# Patient Record
Sex: MALE | Race: WHITE | URBAN - METROPOLITAN AREA
[De-identification: names, ages, dates, MRNs, and addresses within clinical notes are randomized per-mention and may not be internally consistent; named-entity substitution may affect disease eponyms.]

---

## 2017-05-30 ENCOUNTER — HOSPITAL ENCOUNTER (EMERGENCY)
Facility: CLINIC | Age: 77
Discharge: HOME | DRG: 897 | End: 2017-05-30
Attending: EMERGENCY MEDICINE | Admitting: EMERGENCY MEDICINE
Payer: MEDICARE

## 2017-05-30 VITALS
HEART RATE: 77 BPM | OXYGEN SATURATION: 93 % | BODY MASS INDEX: 33.32 KG/M2 | HEIGHT: 65 IN | WEIGHT: 200 LBS | DIASTOLIC BLOOD PRESSURE: 77 MMHG | SYSTOLIC BLOOD PRESSURE: 119 MMHG | TEMPERATURE: 97.8 F | RESPIRATION RATE: 20 BRPM

## 2017-05-30 DIAGNOSIS — R44.3 HALLUCINATIONS: ICD-10-CM

## 2017-05-30 LAB
ALBUMIN UR-MCNC: NEGATIVE MG/DL
ANION GAP SERPL CALCULATED.3IONS-SCNC: 10 MMOL/L (ref 3–14)
APPEARANCE UR: CLEAR
BILIRUB UR QL STRIP: NEGATIVE
BUN SERPL-MCNC: 21 MG/DL (ref 7–30)
CALCIUM SERPL-MCNC: 9.1 MG/DL (ref 8.5–10.1)
CHLORIDE SERPL-SCNC: 102 MMOL/L (ref 94–109)
CO2 SERPL-SCNC: 25 MMOL/L (ref 20–32)
COLOR UR AUTO: YELLOW
CREAT SERPL-MCNC: 1.22 MG/DL (ref 0.66–1.25)
GFR SERPL CREATININE-BSD FRML MDRD: 57 ML/MIN/1.7M2
GLUCOSE SERPL-MCNC: 90 MG/DL (ref 70–99)
GLUCOSE UR STRIP-MCNC: NEGATIVE MG/DL
HGB UR QL STRIP: NEGATIVE
KETONES UR STRIP-MCNC: ABNORMAL MG/DL
LEUKOCYTE ESTERASE UR QL STRIP: ABNORMAL
NITRATE UR QL: NEGATIVE
PH UR STRIP: 6 PH (ref 5–7)
POTASSIUM SERPL-SCNC: 3.9 MMOL/L (ref 3.4–5.3)
RBC #/AREA URNS AUTO: NORMAL /HPF (ref 0–2)
SODIUM SERPL-SCNC: 137 MMOL/L (ref 133–144)
SP GR UR STRIP: 1.02 (ref 1–1.03)
URN SPEC COLLECT METH UR: ABNORMAL
UROBILINOGEN UR STRIP-ACNC: 0.2 EU/DL (ref 0.2–1)
WBC #/AREA URNS AUTO: NORMAL /HPF (ref 0–2)

## 2017-05-30 ASSESSMENT — ENCOUNTER SYMPTOMS
VOMITING: 0
HALLUCINATIONS: 1
FEVER: 0
ABDOMINAL PAIN: 0

## 2017-05-30 NOTE — ED AVS SNAPSHOT
Emergency Department    [address] [place] [address]    Phone:  [phone]    Fax:  [phone]                                       Deion Diaz   MRN: [id number]    Department:   Emergency Department   Date of Visit:  5/30/2017           After Visit Summary Signature Page     I have received my discharge instructions, and my questions have been answered. I have discussed any challenges I see with this plan with the nurse or doctor.    ..........................................................................................................................................  Patient/Patient Representative Signature      ..........................................................................................................................................  Patient Representative Print Name and Relationship to Patient    ..................................................               ................................................  Date                                            Time    ..........................................................................................................................................  Reviewed by Signature/Title    ...................................................              ..............................................  Date                                                            Time

## 2017-05-30 NOTE — ED AVS SNAPSHOT
Emergency Department    [address] [facility] [address]    Phone:  [phone]    Fax:  [phone]                                       Deion Diaz   MRN: [id number]    Department:   Emergency Department   Date of Visit:  5/30/2017           Patient Information     Date Of Birth          1940        Your diagnoses for this visit were:     Hallucinations        You were seen by Corie Garza MD.      Follow-up Information     Follow up with Jerrod Graves MD.    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    Specialty:  EMERGENCY MEDICINE    Why:  As needed, If symptoms worsen    Contact information:    [address] [facility] [address] [phone]        Discharge Instructions       Stop taking the Bactrim antibiotic.    Consider also stopping the Oxycodone today also, because this could be the cause of your hallucinations also.  If you choose not to stop the Oxycodone today, then if the hallucinations persist after you stop the Bactrim, then you will need to stop the Oxycodone also and go back on the Vicodin and discuss alternative pain medication with your doctor.    Do not drive a car until after your hallucinations have been resolved for at least 48 hours.    Discharge References/Attachments     WHAT IS DELIRIUM? (ENGLISH)    CARING FOR A PERSON WITH DELIRIUM (ENGLISH)      24 Hour Appointment Hotline       To make an appointment at any [facility], call [phone] ([phone]). If you don't have a family doctor or clinic, we will help you find one. [place] clinics are conveniently located to serve the needs of you and your family.             Review of your medicines      Our records show that you are taking the  medicines listed below. If these are incorrect, please call your family doctor or clinic.        Dose / Directions Last dose taken    albuterol 108 (90 BASE) MCG/ACT Inhaler   Commonly known as:  PROAIR HFA/PROVENTIL HFA/VENTOLIN HFA   Dose:  2 puff        Inhale 2 puffs into the lungs every 6 hours   Refills:  0        ALLOPURINOL PO        Refills:  0        ASPIRIN PO        Refills:  0        ATENOLOL PO        Refills:  0        ATORVASTATIN CALCIUM PO        Refills:  0        BUPROPION HCL PO        Refills:  0        FLOMAX 0.4 MG capsule   Generic drug:  tamsulosin        Take by mouth daily   Refills:  0        fluticasone 27.5 MCG/SPRAY spray   Commonly known as:  VERAMYST   Dose:  2 spray        Spray 2 sprays into both nostrils daily   Refills:  0        fluticasone-salmeterol 500-50 MCG/DOSE diskus inhaler   Commonly known as:  ADVAIR   Dose:  1 puff        Inhale 1 puff into the lungs every 12 hours   Refills:  0        FUROSEMIDE PO        Refills:  0        GABAPENTIN PO        Refills:  0        HYDROcodone-acetaminophen 5-325 MG per tablet   Commonly known as:  NORCO   Dose:  1 tablet        Take 1 tablet by mouth every 6 hours as needed for moderate to severe pain   Refills:  0        Multi-vitamin Tabs tablet   Dose:  1 tablet        Take 1 tablet by mouth daily   Refills:  0        NITROGLYCERIN SL   Dose:  0.4 mg        Place 0.4 mg under the tongue   Refills:  0                Procedures and tests performed during your visit     *UA reflex to Microscopic (ED Lab POCT Only 3-11)    Basic metabolic panel    Urine Microscopic      Orders Needing Specimen Collection     None      Pending Results     No orders found from 5/28/2017 to 5/31/2017.            Pending Culture Results     No orders found from 5/28/2017 to 5/31/2017.            Pending Results Instructions     If you had any lab results that were not finalized at the time of your Discharge, you can call the ED Lab Result RN at  [phone]. You will be contacted by this team for any positive Lab results or changes in treatment. The nurses are available 7 days a week from 10A to 6:30P.  You can leave a message 24 hours per day and they will return your call.        Test Results From Your Hospital Stay        5/30/2017  8:10 PM      Component Results     Component Value Ref Range & Units Status    Color Urine Yellow  Final    Appearance Urine Clear  Final    Glucose Urine Negative NEG mg/dL Final    Bilirubin Urine Negative NEG Final    Ketones Urine Trace (A) NEG mg/dL Final    Specific Gravity Urine 1.020 1.003 - 1.035 Final    Blood Urine Negative NEG Final    pH Urine 6.0 5.0 - 7.0 pH Final    Protein Albumin Urine Negative NEG mg/dL Final    Urobilinogen Urine 0.2 0.2 - 1.0 EU/dL Final    Nitrite Urine Negative NEG Final    Leukocyte Esterase Urine Trace (A) NEG Final    Source Midstream Urine  Final         5/30/2017  8:19 PM      Component Results     Component Value Ref Range & Units Status    Sodium 137 133 - 144 mmol/L Final    Potassium 3.9 3.4 - 5.3 mmol/L Final    Chloride 102 94 - 109 mmol/L Final    Carbon Dioxide 25 20 - 32 mmol/L Final    Anion Gap 10 3 - 14 mmol/L Final    Glucose 90 70 - 99 mg/dL Final    Urea Nitrogen 21 7 - 30 mg/dL Final    Creatinine 1.22 0.66 - 1.25 mg/dL Final    GFR Estimate 57 (L) >60 mL/min/1.7m2 Final    Non  GFR Calc    GFR Estimate If Black 69 >60 mL/min/1.7m2 Final    African American GFR Calc    Calcium 9.1 8.5 - 10.1 mg/dL Final         5/30/2017  8:10 PM      Component Results     Component Value Ref Range & Units Status    WBC Urine O - 2 0 - 2 /HPF Final    RBC Urine O - 2 0 - 2 /HPF Final                Clinical Quality Measure: Blood Pressure Screening     Your blood pressure was checked while you were in the emergency department today. The last reading we obtained was  BP: 119/77 . Please read the guidelines below about what these numbers mean and what you should do  about them.  If your systolic blood pressure (the top number) is less than 120 and your diastolic blood pressure (the bottom number) is less than 80, then your blood pressure is normal. There is nothing more that you need to do about it.  If your systolic blood pressure (the top number) is 120-139 or your diastolic blood pressure (the bottom number) is 80-89, your blood pressure may be higher than it should be. You should have your blood pressure rechecked within a year by a primary care provider.  If your systolic blood pressure (the top number) is 140 or greater or your diastolic blood pressure (the bottom number) is 90 or greater, you may have high blood pressure. High blood pressure is treatable, but if left untreated over time it can put you at risk for heart attack, stroke, or kidney failure. You should have your blood pressure rechecked by a primary care provider within the next 4 weeks.  If your provider in the emergency department today gave you specific instructions to follow-up with your doctor or provider even sooner than that, you should follow that instruction and not wait for up to 4 weeks for your follow-up visit.        Thank you for choosing [facility]       Thank you for choosing [facility] for your care. Our goal is always to provide you with excellent care. Hearing back from our patients is one way we can continue to improve our services. Please take a few minutes to complete the written survey that you may receive in the mail after you visit with us. Thank you!        [facility] Information     [facility] gives you secure access to your electronic health record. If you see a primary care provider, you can also send messages to your care team and make appointments. If you have questions, please call your primary care clinic.  If you do not have a primary care provider, please call [phone] and they will assist you.        Care EveryWhere ID     This is your Care EveryWhere ID. This could be used by  other organizations to access your [facility] medical records  [id number]        After Visit Summary       This is your record. Keep this with you and show to your community pharmacist(s) and doctor(s) at your next visit.

## 2017-05-31 ENCOUNTER — APPOINTMENT (OUTPATIENT)
Dept: CT IMAGING | Facility: CLINIC | Age: 77
DRG: 897 | End: 2017-05-31
Attending: EMERGENCY MEDICINE
Payer: MEDICARE

## 2017-05-31 ENCOUNTER — HOSPITAL ENCOUNTER (INPATIENT)
Facility: CLINIC | Age: 77
LOS: 1 days | Discharge: NURSING, REHAB OR LONG-TERM CARE | DRG: 897 | End: 2017-06-03
Attending: EMERGENCY MEDICINE | Admitting: HOSPITALIST
Payer: MEDICARE

## 2017-05-31 DIAGNOSIS — M19.90 ARTHRITIS: ICD-10-CM

## 2017-05-31 DIAGNOSIS — G89.29 OTHER CHRONIC PAIN: ICD-10-CM

## 2017-05-31 DIAGNOSIS — R44.3 HALLUCINATIONS: ICD-10-CM

## 2017-05-31 DIAGNOSIS — T50.905A MEDICATION REACTION, INITIAL ENCOUNTER: ICD-10-CM

## 2017-05-31 DIAGNOSIS — R60.0 LOCALIZED EDEMA: Primary | ICD-10-CM

## 2017-05-31 PROBLEM — R62.7 FAILURE TO THRIVE IN ADULT: Status: ACTIVE | Noted: 2017-05-31

## 2017-05-31 LAB
ALBUMIN UR-MCNC: NEGATIVE MG/DL
ANION GAP SERPL CALCULATED.3IONS-SCNC: 8 MMOL/L (ref 3–14)
APPEARANCE UR: CLEAR
BACTERIA #/AREA URNS HPF: ABNORMAL /HPF
BASOPHILS # BLD AUTO: 0 10E9/L (ref 0–0.2)
BASOPHILS NFR BLD AUTO: 0.2 %
BILIRUB UR QL STRIP: NEGATIVE
BUN SERPL-MCNC: 21 MG/DL (ref 7–30)
CALCIUM SERPL-MCNC: 9.7 MG/DL (ref 8.5–10.1)
CHLORIDE SERPL-SCNC: 102 MMOL/L (ref 94–109)
CO2 SERPL-SCNC: 27 MMOL/L (ref 20–32)
COLOR UR AUTO: YELLOW
CREAT SERPL-MCNC: 1.33 MG/DL (ref 0.66–1.25)
DIFFERENTIAL METHOD BLD: NORMAL
EOSINOPHIL # BLD AUTO: 0.1 10E9/L (ref 0–0.7)
EOSINOPHIL NFR BLD AUTO: 1 %
ERYTHROCYTE [DISTWIDTH] IN BLOOD BY AUTOMATED COUNT: 13.3 % (ref 10–15)
ERYTHROCYTE [DISTWIDTH] IN BLOOD BY AUTOMATED COUNT: 13.3 % (ref 10–15)
GFR SERPL CREATININE-BSD FRML MDRD: 52 ML/MIN/1.7M2
GLUCOSE SERPL-MCNC: 86 MG/DL (ref 70–99)
GLUCOSE UR STRIP-MCNC: NEGATIVE MG/DL
HCT VFR BLD AUTO: 38.6 % (ref 40–53)
HCT VFR BLD AUTO: 43.2 % (ref 40–53)
HGB BLD-MCNC: 13 G/DL (ref 13.3–17.7)
HGB BLD-MCNC: 14.4 G/DL (ref 13.3–17.7)
HGB UR QL STRIP: NEGATIVE
IMM GRANULOCYTES # BLD: 0 10E9/L (ref 0–0.4)
IMM GRANULOCYTES NFR BLD: 0.3 %
KETONES UR STRIP-MCNC: 15 MG/DL
LEUKOCYTE ESTERASE UR QL STRIP: ABNORMAL
LYMPHOCYTES # BLD AUTO: 1.2 10E9/L (ref 0.8–5.3)
LYMPHOCYTES NFR BLD AUTO: 11.1 %
MCH RBC QN AUTO: 30.3 PG (ref 26.5–33)
MCH RBC QN AUTO: 30.4 PG (ref 26.5–33)
MCHC RBC AUTO-ENTMCNC: 33.3 G/DL (ref 31.5–36.5)
MCHC RBC AUTO-ENTMCNC: 33.7 G/DL (ref 31.5–36.5)
MCV RBC AUTO: 90 FL (ref 78–100)
MCV RBC AUTO: 91 FL (ref 78–100)
MONOCYTES # BLD AUTO: 1.2 10E9/L (ref 0–1.3)
MONOCYTES NFR BLD AUTO: 11.2 %
NEUTROPHILS # BLD AUTO: 8 10E9/L (ref 1.6–8.3)
NEUTROPHILS NFR BLD AUTO: 76.2 %
NITRATE UR QL: NEGATIVE
NRBC # BLD AUTO: 0 10*3/UL
NRBC BLD AUTO-RTO: 0 /100
PH UR STRIP: 6 PH (ref 5–7)
PLATELET # BLD AUTO: 287 10E9/L (ref 150–450)
PLATELET # BLD AUTO: 327 10E9/L (ref 150–450)
POTASSIUM SERPL-SCNC: 4 MMOL/L (ref 3.4–5.3)
RBC # BLD AUTO: 4.28 10E12/L (ref 4.4–5.9)
RBC # BLD AUTO: 4.75 10E12/L (ref 4.4–5.9)
RBC #/AREA URNS AUTO: ABNORMAL /HPF (ref 0–2)
SODIUM SERPL-SCNC: 137 MMOL/L (ref 133–144)
SP GR UR STRIP: 1.01 (ref 1–1.03)
URN SPEC COLLECT METH UR: ABNORMAL
UROBILINOGEN UR STRIP-ACNC: 0.2 EU/DL (ref 0.2–1)
WBC # BLD AUTO: 10.5 10E9/L (ref 4–11)
WBC # BLD AUTO: 10.8 10E9/L (ref 4–11)
WBC #/AREA URNS AUTO: ABNORMAL /HPF (ref 0–2)

## 2017-05-31 PROCEDURE — 99285 EMERGENCY DEPT VISIT HI MDM: CPT | Mod: 25

## 2017-05-31 PROCEDURE — G0378 HOSPITAL OBSERVATION PER HR: HCPCS

## 2017-05-31 PROCEDURE — 81001 URINALYSIS AUTO W/SCOPE: CPT | Performed by: EMERGENCY MEDICINE

## 2017-05-31 PROCEDURE — 25000132 ZZH RX MED GY IP 250 OP 250 PS 637: Mod: GY | Performed by: HOSPITALIST

## 2017-05-31 PROCEDURE — 36415 COLL VENOUS BLD VENIPUNCTURE: CPT | Performed by: HOSPITALIST

## 2017-05-31 PROCEDURE — 99220 ZZC INITIAL OBSERVATION CARE,LEVL III: CPT | Performed by: HOSPITALIST

## 2017-05-31 PROCEDURE — 80048 BASIC METABOLIC PNL TOTAL CA: CPT | Performed by: EMERGENCY MEDICINE

## 2017-05-31 PROCEDURE — 85025 COMPLETE CBC W/AUTO DIFF WBC: CPT | Performed by: EMERGENCY MEDICINE

## 2017-05-31 PROCEDURE — A9270 NON-COVERED ITEM OR SERVICE: HCPCS | Mod: GY | Performed by: HOSPITALIST

## 2017-05-31 PROCEDURE — 70450 CT HEAD/BRAIN W/O DYE: CPT

## 2017-05-31 PROCEDURE — 85027 COMPLETE CBC AUTOMATED: CPT | Performed by: HOSPITALIST

## 2017-05-31 RX ORDER — BUPROPION HYDROCHLORIDE 150 MG/1
150 TABLET, EXTENDED RELEASE ORAL 2 TIMES DAILY
Status: ON HOLD | COMMUNITY
End: 2017-06-03

## 2017-05-31 RX ORDER — TAMSULOSIN HYDROCHLORIDE 0.4 MG/1
0.4 CAPSULE ORAL AT BEDTIME
Status: DISCONTINUED | OUTPATIENT
Start: 2017-05-31 | End: 2017-06-03 | Stop reason: HOSPADM

## 2017-05-31 RX ORDER — POLYETHYLENE GLYCOL 3350 17 G/17G
17 POWDER, FOR SOLUTION ORAL DAILY PRN
Status: DISCONTINUED | OUTPATIENT
Start: 2017-05-31 | End: 2017-06-03 | Stop reason: HOSPADM

## 2017-05-31 RX ORDER — HYDROCODONE BITARTRATE AND ACETAMINOPHEN 5; 325 MG/1; MG/1
1 TABLET ORAL EVERY 4 HOURS PRN
Status: DISCONTINUED | OUTPATIENT
Start: 2017-05-31 | End: 2017-06-03 | Stop reason: HOSPADM

## 2017-05-31 RX ORDER — ONDANSETRON 4 MG/1
4 TABLET, ORALLY DISINTEGRATING ORAL EVERY 6 HOURS PRN
Status: DISCONTINUED | OUTPATIENT
Start: 2017-05-31 | End: 2017-06-03 | Stop reason: HOSPADM

## 2017-05-31 RX ORDER — BUPROPION HYDROCHLORIDE 150 MG/1
150 TABLET, EXTENDED RELEASE ORAL 2 TIMES DAILY
Status: DISCONTINUED | OUTPATIENT
Start: 2017-05-31 | End: 2017-06-01

## 2017-05-31 RX ORDER — GABAPENTIN 300 MG/1
300 CAPSULE ORAL AT BEDTIME
Status: DISCONTINUED | OUTPATIENT
Start: 2017-05-31 | End: 2017-06-03 | Stop reason: HOSPADM

## 2017-05-31 RX ORDER — SODIUM CHLORIDE 9 MG/ML
INJECTION, SOLUTION INTRAVENOUS CONTINUOUS
Status: CANCELLED | OUTPATIENT
Start: 2017-05-31

## 2017-05-31 RX ORDER — ONDANSETRON 2 MG/ML
4 INJECTION INTRAMUSCULAR; INTRAVENOUS EVERY 6 HOURS PRN
Status: DISCONTINUED | OUTPATIENT
Start: 2017-05-31 | End: 2017-06-03 | Stop reason: HOSPADM

## 2017-05-31 RX ORDER — ACETAMINOPHEN 325 MG/1
650 TABLET ORAL 3 TIMES DAILY
Status: DISCONTINUED | OUTPATIENT
Start: 2017-05-31 | End: 2017-06-03 | Stop reason: HOSPADM

## 2017-05-31 RX ORDER — ATENOLOL 50 MG/1
50 TABLET ORAL DAILY
Status: DISCONTINUED | OUTPATIENT
Start: 2017-06-01 | End: 2017-06-03 | Stop reason: HOSPADM

## 2017-05-31 RX ORDER — FUROSEMIDE 20 MG
20 TABLET ORAL DAILY
Status: DISCONTINUED | OUTPATIENT
Start: 2017-06-01 | End: 2017-06-03

## 2017-05-31 RX ORDER — BISACODYL 10 MG
10 SUPPOSITORY, RECTAL RECTAL DAILY PRN
Status: DISCONTINUED | OUTPATIENT
Start: 2017-05-31 | End: 2017-06-03 | Stop reason: HOSPADM

## 2017-05-31 RX ORDER — ALBUTEROL SULFATE 90 UG/1
2 AEROSOL, METERED RESPIRATORY (INHALATION) EVERY 6 HOURS PRN
Status: DISCONTINUED | OUTPATIENT
Start: 2017-05-31 | End: 2017-06-03 | Stop reason: HOSPADM

## 2017-05-31 RX ORDER — ALLOPURINOL 100 MG/1
100 TABLET ORAL DAILY
Status: DISCONTINUED | OUTPATIENT
Start: 2017-06-01 | End: 2017-06-03 | Stop reason: HOSPADM

## 2017-05-31 RX ORDER — NALOXONE HYDROCHLORIDE 0.4 MG/ML
.1-.4 INJECTION, SOLUTION INTRAMUSCULAR; INTRAVENOUS; SUBCUTANEOUS
Status: DISCONTINUED | OUTPATIENT
Start: 2017-05-31 | End: 2017-06-03 | Stop reason: HOSPADM

## 2017-05-31 RX ORDER — ACETAMINOPHEN 650 MG/1
650 SUPPOSITORY RECTAL EVERY 4 HOURS PRN
Status: DISCONTINUED | OUTPATIENT
Start: 2017-05-31 | End: 2017-06-02

## 2017-05-31 RX ORDER — AMOXICILLIN 250 MG
1-2 CAPSULE ORAL 2 TIMES DAILY PRN
Status: DISCONTINUED | OUTPATIENT
Start: 2017-05-31 | End: 2017-06-03 | Stop reason: HOSPADM

## 2017-05-31 RX ORDER — ACETAMINOPHEN 325 MG/1
650 TABLET ORAL EVERY 4 HOURS PRN
Status: DISCONTINUED | OUTPATIENT
Start: 2017-05-31 | End: 2017-06-02

## 2017-05-31 RX ORDER — LIDOCAINE 40 MG/G
CREAM TOPICAL
Status: DISCONTINUED | OUTPATIENT
Start: 2017-05-31 | End: 2017-06-03 | Stop reason: HOSPADM

## 2017-05-31 RX ADMIN — HYDROCODONE BITARTRATE AND ACETAMINOPHEN 1 TABLET: 5; 325 TABLET ORAL at 21:08

## 2017-05-31 RX ADMIN — GABAPENTIN 300 MG: 300 CAPSULE ORAL at 21:08

## 2017-05-31 RX ADMIN — TAMSULOSIN HYDROCHLORIDE 0.4 MG: 0.4 CAPSULE ORAL at 21:08

## 2017-05-31 RX ADMIN — BUPROPION HYDROCHLORIDE 150 MG: 150 TABLET, FILM COATED, EXTENDED RELEASE ORAL at 21:08

## 2017-05-31 RX ADMIN — ACETAMINOPHEN 650 MG: 325 TABLET, FILM COATED ORAL at 21:12

## 2017-05-31 NOTE — IP AVS SNAPSHOT
` `     [place] [address] [facility]: [phone]            Medication Administration Report for Deion Diaz as of 06/03/17 1614   Legend:    Given Hold Not Given Due Canceled Entry Other Actions    Time Time (Time) Time  Time-Action       Inactive    Active    Linked        Medications 05/28/17 05/29/17 05/30/17 05/31/17 06/01/17 06/02/17 06/03/17    acetaminophen (TYLENOL) tablet 650 mg  Dose: 650 mg Freq: 3 TIMES DAILY Route: PO  Start: 05/31/17 2200   Admin Instructions: Maximum acetaminophen dose from all sources = 75 mg/kg/day not to exceed 4 grams/day.        2112 (650 mg)-Given        1057 (650 mg)-Given       1635 (650 mg)-Given       2108 (650 mg)-Given        0804 (650 mg)-Given       1621 (650 mg)-Given       2106 (650 mg)-Given        0838 (650 mg)-Given       1535 (650 mg)-Given       [ ] 2200           albuterol (PROAIR HFA/PROVENTIL HFA/VENTOLIN HFA) Inhaler 2 puff  Dose: 2 puff Freq: EVERY 6 HOURS PRN Route: IN  PRN Reason: shortness of breath / dyspnea  Start: 05/31/17 1932              allopurinol (ZYLOPRIM) tablet 100 mg  Dose: 100 mg Freq: DAILY Route: PO  Start: 06/01/17 0900        1058 (100 mg)-Given        0806 (100 mg)-Given        0838 (100 mg)-Given           atenolol (TENORMIN) tablet 50 mg  Dose: 50 mg Freq: DAILY Route: PO  Start: 06/01/17 0900        1055 (50 mg)-Given        0804 (50 mg)-Given        0838 (50 mg)-Given           bisacodyl (DULCOLAX) Suppository 10 mg  Dose: 10 mg Freq: DAILY PRN Route: RE  PRN Reason: constipation  Start: 05/31/17 1932   Admin Instructions: Hold for loose stools.  This is the third step of a three step constipation treatment protocol.               buPROPion (WELLBUTRIN XL) 24 hr tablet 150 mg  Dose: 150 mg Freq: DAILY Route: PO  Start: 06/02/17 0900   Admin Instructions: DO NOT CRUSH.          0804 (150 mg)-Given        0838 (150 mg)-Given           fluticasone-salmeterol (ADVAIR) 500-50 MCG/DOSE diskus inhaler 1 puff  Dose: 1  puff Freq: EVERY 12 HOURS Route: IN  Start: 05/31/17 2000   Admin Instructions: *Do not use more frequently than twice daily.*  Rinse mouth after use.<br>Use patient's own medication.        2252 (1 puff)-Given        1059 (1 puff)-Given       2107 (1 puff)-Given        0811 (1 puff)-Given              2106 (1 puff)-Given        0839 (1 puff)-Given       [ ] 2115           gabapentin (NEURONTIN) capsule 300 mg  Dose: 300 mg Freq: AT BEDTIME Route: PO  Start: 05/31/17 2200       2108 (300 mg)-Given        2110 (300 mg)-Given        2106 (300 mg)-Given        [ ] 2200           HYDROcodone-acetaminophen (NORCO) 5-325 MG per tablet 1 tablet  Dose: 1 tablet Freq: EVERY 4 HOURS PRN Route: PO  PRN Reason: moderate to severe pain  Start: 05/31/17 2034   Admin Instructions: Maximum acetaminophen dose from all sources= 75 mg/kg/day not to exceed 4 grams        2108 (1 tablet)-Given         0001 (1 tablet)-Given       0626 (1 tablet)-Given       1058 (1 tablet)-Given       2241 (1 tablet)-Given        1605 (1 tablet)-Given           lidocaine (LIDODERM) 5 % Patch 1 patch  Dose: 1 patch Freq: EVERY 24 HOURS 2000 Route: TD  Start: 06/01/17 2000   Admin Instructions: Apply patch(s) to left hip. To prevent lidocaine toxicity, patient should be patch free for 12 hrs daily. Patches may be cut to smaller size prior to removing release liner.  NEVER APPLY HEAT OVER PATCH which will increase absorption and may lead to risk of local anesthetic toxicity. Do not apply over area where liposomal bupivacaine was injected for 96 hours post injection.         2106 (1 patch)-Given        2106 (1 patch)-Given        [ ] 2000          And  lidocaine (LIDODERM) patch REMOVAL  Freq: EVERY 24 HOURS 0800 Route: TD  Start: 06/02/17 0800   Admin Instructions: Remove lidocaine Patch.          0806 ( )-Patch Removed        0845 ( )-Patch Removed          And  lidocaine (LIDODERM) patch in PLACE  Freq: EVERY 8 HOURS Route: TD  Start: 06/01/17 2000  "  Admin Instructions: Chart every shift, confirming that patch is still in place on patient (no barcode scan needed). See patch order for dose information.  NEVER APPLY HEAT OVER PATCH which will increase absorption and may lead to risk of local anesthetic toxicity. Do not apply over area where liposomal bupivacaine injected for 96 hours.         2112 ( )-Patch in Place        0402 ( )-Patch in Place       1328 ( )-Negative       2110 ( )-Patch in Place        0506 ( )-Patch in Place       1200 ( )-Negative       [ ] 2000           lidocaine (LMX4) cream  Freq: EVERY 1 HOUR PRN Route: Top  PRN Reason: pain  PRN Comment: with VAD insertion or accessing implanted port.  Start: 05/31/17 1932   Admin Instructions: Do NOT give if patient has a history of allergy to any local anesthetic or any \"vazquez\" product.  Apply 30 minutes prior to VAD insertion or port access. MAX Dose: 2.5 g (  of 5 g tube).               lidocaine 1 % 1 mL  Dose: 1 mL Freq: EVERY 1 HOUR PRN Route: OTHER  PRN Comment: mild pain with VAD insertion or accessing implanted port  Start: 05/31/17 1932   Admin Instructions: Do NOT give if patient has a history of allergy to any local anesthetic or any \"vazquez\" product. MAX dose 1 mL subcutaneous OR intradermal in divided doses.               melatonin tablet 3 mg  Dose: 3 mg Freq: AT BEDTIME Route: PO  Start: 06/01/17 2200 2110 (3 mg)-Given        2106 (3 mg)-Given        [ ] 2200           naloxone (NARCAN) injection 0.1-0.4 mg  Dose: 0.1-0.4 mg Freq: EVERY 2 MIN PRN Route: IV  PRN Reason: opioid reversal  Start: 05/31/17 1932   Admin Instructions: For respiratory rate LESS than or EQUAL to 8.  Partial reversal dose:  0.1 mg titrated q 2 minutes for Analgesia Side Effects Monitoring Sedation Level of 3 (frequently drowsy, arousable, drifts to sleep during conversation).Full reversal dose:  0.4 mg bolus for Analgesia Side Effects Monitoring Sedation Level of 4 (somnolent, minimal or no response to " stimulation).               ondansetron (ZOFRAN-ODT) ODT tab 4 mg  Dose: 4 mg Freq: EVERY 6 HOURS PRN Route: PO  PRN Reason: nausea  Start: 05/31/17 1932   Admin Instructions: This is Step 1 of nausea and vomiting management.  If nausea not resolved in 15 minutes, go to Step 2 prochlorperazine (COMPAZINE). Do not push through foil backing. Peel back foil and gently remove. Place on tongue immediately. Administration with liquid unnecessary              Or  ondansetron (ZOFRAN) injection 4 mg  Dose: 4 mg Freq: EVERY 6 HOURS PRN Route: IV  PRN Reasons: nausea,vomiting  Start: 05/31/17 1932   Admin Instructions: This is Step 1 of nausea and vomiting management.  If nausea not resolved in 15 minutes, go to Step 2 prochlorperazine (COMPAZINE).  Irritant.               polyethylene glycol (MIRALAX/GLYCOLAX) Packet 17 g  Dose: 17 g Freq: DAILY PRN Route: PO  PRN Reason: constipation  Start: 05/31/17 1932   Admin Instructions: Give in 8oz of  water, juice, or soda. Hold for loose stools.  This is the second step of a three step constipation treatment protocol.  1 Packet = 17 grams. Mixed prescribed dose in 8 ounces of water. Follow with 8 oz. of water.               QUEtiapine (SEROquel) half-tab 12.5 mg  Dose: 12.5 mg Freq: EVERY 6 HOURS PRN Route: PO  PRN Comment: halucinations  Start: 06/01/17 0802              QUEtiapine (SEROquel) tablet 25 mg  Dose: 25 mg Freq: AT BEDTIME Route: PO  Start: 06/01/17 2200 2110 (25 mg)-Given        2106 (25 mg)-Given        [ ] 2200           senna-docusate (SENOKOT-S;PERICOLACE) 8.6-50 MG per tablet 1-2 tablet  Dose: 1-2 tablet Freq: 2 TIMES DAILY PRN Route: PO  PRN Comment: constipation   Start: 05/31/17 1932   Admin Instructions: If no bowel movement in 24 hours, increase to 2 tablets PO BID.  Hold for loose stools.   This is the first step of a three step constipation treatment protocol.               sodium chloride (PF) 0.9% PF flush 3 mL  Dose: 3 mL Freq: EVERY 1 HOUR PRN  Route: IK  PRN Reason: line flush  Start: 05/31/17 1932   Admin Instructions: for peripheral IV flush post IV meds         1510 (3 mL)-Given             sodium chloride (PF) 0.9% PF flush 3 mL  Dose: 3 mL Freq: EVERY 8 HOURS Route: IK  Start: 05/31/17 1552   Admin Instructions: And Q1H PRN, to lock peripheral IV dormant line.                (0622)-Not Given              1635 (3 mL)-Given        (0402)-Not Given       (0806)-Not Given       (1605)-Not Given        (0505)-Not Given       (0840)-Not Given       [ ] 1600           sodium chloride (PF) 0.9% PF flush 3 mL  Dose: 3 mL Freq: EVERY 1 HOUR PRN Route: IK  PRN Reason: line flush  PRN Comment: for peripheral IV flush post IV meds  Start: 05/31/17 1551              tamsulosin (FLOMAX) capsule 0.4 mg  Dose: 0.4 mg Freq: AT BEDTIME Route: PO  Start: 05/31/17 2200       2108 (0.4 mg)-Given        2110 (0.4 mg)-Given        2106 (0.4 mg)-Given        [ ] 2200          Future Medications  Medications 05/28/17 05/29/17 05/30/17 05/31/17 06/01/17 06/02/17 06/03/17       furosemide (LASIX) tablet 40 mg  Dose: 40 mg Freq: DAILY Route: PO  Start: 06/04/17 0900             Completed Medications  Medications 05/28/17 05/29/17 05/30/17 05/31/17 06/01/17 06/02/17 06/03/17         Dose: 20 mg Freq: ONCE Route: PO  Start: 06/03/17 0915   End: 06/03/17 1011          1011 (20 mg)-Given             Dose: 40 mg Freq: ONCE Route: PO  Start: 06/02/17 1545   End: 06/02/17 1621         1621 (40 mg)-Given              Dose: 5 mg Freq: ONCE Route: IM  Start: 06/02/17 0500   End: 06/02/17 0457         0457 (5 mg)-Given           Discontinued Medications  Medications 05/28/17 05/29/17 05/30/17 05/31/17 06/01/17 06/02/17 06/03/17         Dose: 650 mg Freq: EVERY 4 HOURS PRN Route: RE  PRN Reason: mild pain  Start: 05/31/17 1932   End: 06/02/17 1638   Admin Instructions: Alternate ibuprofen (if ordered) with acetaminophen.  Maximum acetaminophen dose from all sources = 75 mg/kg/day not to  exceed 4 grams/day.          1638-Med Discontinued          Dose: 650 mg Freq: EVERY 4 HOURS PRN Route: PO  PRN Reason: mild pain  Start: 05/31/17 1932   End: 06/02/17 1638   Admin Instructions: Alternate ibuprofen (if ordered) with acetaminophen.  Maximum acetaminophen dose from all sources = 75 mg/kg/day not to exceed 4 grams/day.          1638-Med Discontinued          Dose: 100 mg Freq: 2 TIMES DAILY Route: PO  Start: 06/01/17 0900   End: 06/01/17 0754   Admin Instructions: DO NOT CRUSH.         0754-Med Discontinued           Dose: 150 mg Freq: 2 TIMES DAILY Route: PO  Start: 05/31/17 2100   End: 06/01/17 0753   Admin Instructions: DO NOT CRUSH.        2108 (150 mg)-Given        0753-Med Discontinued           Dose: 20 mg Freq: DAILY Route: PO  Start: 06/01/17 0900   End: 06/03/17 0910        1055 (20 mg)-Given        0804 (20 mg)-Given        0838 (20 mg)-Given       0910-Med Discontinued         Dose: 1 mg Freq: AT BEDTIME PRN Route: PO  PRN Reason: sleep  Start: 05/31/17 1932   End: 06/01/17 1429   Admin Instructions: Do not give unless at least 6 hours of uninterrupted sleep is expected.         1429-Med Discontinued           Dose: 3 mL Freq: EVERY 8 HOURS Route: IK  Start: 05/31/17 1945   End: 05/31/17 2258   Admin Instructions: And Q1H PRN, to lock peripheral IV dormant line.        2108 (3 mL)-Given       2258-Med Discontinued       Medications 05/28/17 05/29/17 05/30/17 05/31/17 06/01/17 06/02/17 06/03/17

## 2017-05-31 NOTE — IP AVS SNAPSHOT
` `       Patient Information     Patient Name Sex     Emilia Almaraz ([id number]) Male 1940       Room Bed    6618 6618-01      Patient Demographics     Address Phone E-mail Address    [address] [address] [phone] (Home)  none (Work)  none (Mobile) [email or web address]      Patient Ethnicity & Race     Ethnic Group Patient Race     White      Emergency Contact(s)     Name Relation Home Work Mobile    EMILIA ALMARAZ Son [phone]      Mackenzie Almaraz Daughter [phone]        Documents on File        Status Date Received Description       Documents for the Patient    Affiliate Privacy placeholder   phase3    Consent for EHR Access Received 16     Insurance Card Received 16     External Medication Information Consent       Patient ID Received 16     [facility] Specified Other       Consent for Services/Privacy Notice - Hospital/Clinic Received 16     Privacy Notice - [place] Received 16     Consent for EHR Access Received 17     Consent for Services/Privacy Notice - Hospital/Clinic          Documents for the Encounter    CMS IM for Patient Signature Received 17 [facility]    EMS/Ambulance Record  17 [facility]    Observation Notice Received 17     CMS GRANADOS for Patient Signature Received 17       Admission Information     Attending Provider Admitting Provider Admission Type Admission Date/Time    Ciera Mccauley MD Kane, Justin Kieran, MD Emergency 17  1507    Discharge Date Hospital Service Auth/Cert Status [facility]    Unit Room/Bed Admission Status       [address] UNIT [address]18-01 Admission (Confirmed)       Admission     Complaint    Medication reaction, initial encounter - possible, Failure to thrive in adult, Hallucination, drug-induced (H)      Hospital Account     Name Acct ID Class Status Primary Coverage     Deion Diaz [id number] Inpatient Open MEDICARE - MEDICARE            Guarantor Account (for Hospital Account #[id number])     Name Relation to Pt Service Area Active? Acct Type    Deion Diaz Self FCS Yes Personal/Family    Address Phone          [address] [facility] [place] [address] [phone](H)  none(O)              Coverage Information (for Hospital Account #[id number])     1. MEDICARE/MEDICARE     F/O Payor/Plan Precert #    MEDICARE/MEDICARE     Subscriber Subscriber #    Deion Diaz [id number]    Address Phone    [employer]  [address]  [place] [address] [phone]          2. COMMERCIAL/[employer] BENEFIT PLAN     F/O Payor/Plan Precert #    COMMERCIAL/[employer] BENEFIT PLAN     Subscriber Subscriber #    Deion Diaz [id number]    Address Phone    [address]  [place] [address] [phone]

## 2017-05-31 NOTE — IP AVS SNAPSHOT
"` `           [place] [address] [facility]: [phone]                                              INTERAGENCY TRANSFER FORM - NURSING   2017                    Hospital Admission Date: 2017  EMILIA ALMARAZ   : 1940  Sex: Male        Attending Provider: Ciera Mccauley MD     Allergies:  No Known Allergies    Infection:  None   Service:  HOSPITALIST    Ht:  1.651 m (5' 5\")   Wt:  90.7 kg (200 lb)   Admission Wt:  --    BMI:  33.28 kg/m 2   BSA:  2.04 m 2            Patient PCP Information     Provider PCP Type    Jerrod Graves MD General      Current Code Status     Date Active Code Status Order ID Comments User Context       Prior      Code Status History     Date Active Date Inactive Code Status Order ID Comments User Context    6/3/2017 11:17 AM  Full Code [id number]  Ciera Mccauley MD Outpatient    2017  7:32 PM 6/3/2017 11:17 AM Full Code [id number]  Jay Ovalle MD Inpatient      Advance Directives        Does patient have a scanned Advance Directive/ACP document in EPIC?           Yes        Hospital Problems as of 6/3/2017              Priority Class Noted POA    Failure to thrive in adult Medium  2017 Yes    Hallucination, drug-induced (H) Medium  2017 Yes      Non-Hospital Problems as of 6/3/2017     None      Immunizations     None         END      ASSESSMENT     Discharge Profile Flowsheet     EXPECTED DISCHARGE     Patient's communication style  spoken language (English or Bilingual) 17 1513    Expected Discharge Date  17 1033   FINAL RESOURCES      DISCHARGE NEEDS ASSESSMENT     Referrals Placed  Post Acute Facilities;Transportation 17 1033    Equipment Currently Used at Home  other (see comments);bath bench;grab bar (motorized scooter) 17 0813   SKIN      # of Referrals Placed by CTS  Post Acute Facilities;Transportation 17 1033   Inspection  Full 17 0951    GASTROINTESTINAL " "(ADULT,PEDIATRIC,OB)     Skin WDL  ex 06/03/17 0951    GI WDL  ex 06/03/17 0951   Skin Temperature  warm 06/03/17 0951    Abdominal Appearance  obese 06/03/17 0951   Skin Moisture  dry 06/03/17 0951    Last Bowel Movement  06/02/17 06/02/17 1413   Skin Integrity  abrasion(s);scab(s);scar(s);wound(s) 06/03/17 0951    Incontinence Containment Product  incontinence brief 06/03/17 0951   Additional Documentation  -- (Open wounds along the lower legs bilat open to air.) 06/01/17 1139    Passing flatus  yes 06/03/17 0951   SAFETY      COMMUNICATION ASSESSMENT     Safety WDL  WDL 06/03/17 0951                 Assessment WDL (Within Defined Limits) Definitions           Safety WDL     Effective: 09/28/15    Row Information: <b>WDL Definition:</b> Bed in low position, wheels locked; call light in reach; upper side rails up x 2; ID band on<br> <font color=\"gray\"><i>Item=AS safety wdl>>List=AS safety wdl>>Version=F14</i></font>      Skin WDL     Effective: 09/28/15    Row Information: <b>WDL Definition:</b> Warm; dry; intact; elastic; without discoloration; pressure points without redness<br> <font color=\"gray\"><i>Item=AS skin wdl>>List=AS skin wdl>>Version=F14</i></font>      Vitals     Vital Signs Flowsheet     VITAL SIGNS     Pain Rating: FLACC (rest) - Legs  2 06/02/17 0243    Temp  98.3  F (36.8  C) 06/03/17 0803   Pain Rating: FLACC (rest) - Activity  1 06/02/17 0243    Temp src  Oral 06/03/17 0803   Pain Rating: FLACC (rest) - Cry  1 06/02/17 0243    Resp  18 06/03/17 1607   Pain Rating: FLACC (rest) - Consolability  2 06/02/17 0243    Pulse  93 06/03/17 0803   Score: FLACC (rest)  8 06/02/17 0243    Heart Rate  83 06/02/17 1622   ANALGESIA SIDE EFFECTS MONITORING      Pulse/Heart Rate Source  Monitor 06/03/17 0227   Side Effects Monitoring: Respiratory Quality  R 06/03/17 1607    BP  117/59 06/03/17 0803   Side Effects Monitoring: Respiratory Depth  N 06/03/17 1607    BP Location  Left arm 06/03/17 0803   Side Effects " "Monitoring: Sedation Level  1 06/03/17 1607    OXYGEN THERAPY     HEIGHT AND WEIGHT      SpO2  90 % 06/03/17 0803   Height  1.651 m (5' 5\") 05/31/17 1518    O2 Device  None (Room air) 06/03/17 0803   Height Method  Estimated 05/31/17 1518    PACEMAKER     Estimated Weight (From ED)  90.7 kg (200 lb) 05/31/17 1518    Pacemaker  -- 06/01/17 2353   [place] COMA SCALE      PAIN/COMFORT     Best Eye Response  4-->(E4) spontaneous 06/03/17 0847    Patient Currently in Pain  yes 06/03/17 1607   Best Motor Response  6-->(M6) obeys commands 06/03/17 0847    Preferred Pain Scale  number (Numeric Rating Pain Scale) 06/03/17 1552   Best Verbal Response  5-->(V5) oriented 06/03/17 0847    Patient's Stated Pain Goal  No pain 06/02/17 0243   [place] Coma Scale Score  15 06/03/17 0847    0-10 Pain Scale  4 06/03/17 1606   POSITIONING      Pain Location  Leg 06/03/17 1552   Body Position  supine, head elevated 06/03/17 0227    Pain Orientation  Left 06/03/17 1552   Head of Bed (HOB)  HOB at 20-30 degrees 06/03/17 0227    Pain Descriptors  Constant 06/03/17 0847   Positioning/Transfer Devices  pillows;in use 06/03/17 0227    Pain Management Interventions  analgesia administered 06/03/17 0847   Chair  Upright in chair 06/03/17 0847    Pain Intervention(s)  Medication (See eMAR) 06/03/17 1607   DAILY CARE      Response to Interventions  Absence of nonverbal indicators of pain 06/02/17 2351   Activity Type  activity adjusted per tolerance 06/03/17 0847    PAIN ASSESSMENT/FLACC     Activity Level of Assistance  assistance, 2 people 06/03/17 0847    Pain Rating: FLACC (rest) - Face  2 06/02/17 0243                 Patient Lines/Drains/Airways Status    Active LINES/DRAINS/AIRWAYS     None            Patient Lines/Drains/Airways Status    Active PICC/CVC     None            Intake/Output Detail Report     Date Intake   Output Net    Shift P.O. IV Piggyback Total Urine Total       Day 06/02/17 0700 - 06/02/17 1459 270 -- 270 425 425 -155    " Cathy 06/02/17 1500 - 06/02/17 2259 -- -- -- 225 225 -225    Noc 06/02/17 2300 - 06/03/17 0659 -- -- -- -- -- 0    Day 06/03/17 0700 - 06/03/17 1459 240 -- 240 -- -- 240    Cathy 06/03/17 1500 - 06/03/17 2259 -- -- -- -- -- 0      Last Void/BM       Most Recent Value    Urine Occurrence 1 at 06/02/2017 2022    Stool Occurrence 1 at 06/02/2017 0915      Case Management/Discharge Planning     Case Management/Discharge Planning Flowsheet     REFERRAL INFORMATION     FINAL RESOURCES      # of Referrals Placed by CTS  Post Acute Facilities;Transportation 06/03/17 1033   Equipment Currently Used at Home  other (see comments);bath bench;grab bar (motorized scooter) 06/01/17 0813    Reason For Consult  discharge planning 06/03/17 1031   Referrals Placed  Post Acute Facilities;Transportation 06/03/17 1033     Assigned to Case  Sima Gibbons 06/03/17 1031   ABUSE RISK SCREEN      LIVING ENVIRONMENT     QUESTION TO PATIENT:  Has a member of your family or a partner(now or in the past) intimidated, hurt, manipulated, or controlled you in any way?  no 05/31/17 1526    Lives With  alone 06/01/17 0813   QUESTION TO PATIENT: Do you feel safe going back to the place where you are living?  yes 05/31/17 1526    Living Arrangements  condominium 06/01/17 0813   OBSERVATION: Is there reason to believe there has been maltreatment of a vulnerable adult (ie. Physical/Sexual/Emotional abuse, self neglect, lack of adequate food, shelter, medical care, or financial exploitation)?  no 05/31/17 1526    COPING/STRESS     HOMICIDE RISK      Major Change/Loss/Stressor  hospitalization 06/01/17 1738   Homicidal Ideation  no 05/31/17 1526    EXPECTED DISCHARGE     (R) MENTAL HEALTH SUICIDE RISK      Expected Discharge Date  06/03/17 06/03/17 1033   Are you depressed or being treated for depression?  No 06/01/17 1738

## 2017-05-31 NOTE — IP AVS SNAPSHOT
"          [place] [address] [facility]: [phone]                                              INTERAGENCY TRANSFER FORM - LAB / IMAGING / EKG / EMG RESULTS   2017                    Hospital Admission Date: 2017  EMILIA ALMARAZ   : 1940  Sex: Male        Attending Provider: Ciera Mccauley MD     Allergies:  No Known Allergies    Infection:  None   Service:  HOSPITALIST    Ht:  1.651 m (5' 5\")   Wt:  90.7 kg (200 lb)   Admission Wt:  --    BMI:  33.28 kg/m 2   BSA:  2.04 m 2            Patient PCP Information     Provider PCP Type    Jerrod Graves MD General         Lab Results - 3 Days      Basic metabolic panel [[id number]] (Abnormal)  Resulted: 17 0850, Result status: Final result    Ordering provider: Ciera Mccauley MD  17 0001 Resulting lab: [facility]    Specimen Information    Type Source Collected On   Blood  17 0816          Components       Value Reference Range Flag Lab   Sodium 142 133 - 144 mmol/L  FrStHsLb   Potassium 3.5 3.4 - 5.3 mmol/L  FrStHsLb   Chloride 107 94 - 109 mmol/L  FrStHsLb   Carbon Dioxide 26 20 - 32 mmol/L  FrStHsLb   Anion Gap 9 3 - 14 mmol/L  FrStHsLb   Glucose 195 70 - 99 mg/dL H FrStHsLb   Urea Nitrogen 30 7 - 30 mg/dL  FrStHsLb   Creatinine 1.19 0.66 - 1.25 mg/dL  FrStHsLb   GFR Estimate 59 >60 mL/min/1.7m2 L FrStHsLb   Comment:  Non  GFR Calc   GFR Estimate If Black 71 >60 mL/min/1.7m2  FrStHsLb   Comment:  African American GFR Calc   Calcium 9.2 8.5 - 10.1 mg/dL  FrStHsLb            Phosphorus [[id number]]  Resulted: 17 0850, Result status: Final result    Ordering provider: Ciera Mccauley MD  17 0001 Resulting lab: [facility]    Specimen Information    Type Source Collected On   Blood  17 0816          Components       Value Reference Range Flag Lab   Phosphorus 3.0 2.5 - 4.5 mg/dL  FrStHsLb            Basic metabolic panel [[id number]] " (Abnormal)  Resulted: 06/01/17 0822, Result status: Final result    Ordering provider: Jay Ovalle MD  06/01/17 0001 Resulting lab: [facility]    Specimen Information    Type Source Collected On   Blood  06/01/17 0744          Components       Value Reference Range Flag Lab   Sodium 139 133 - 144 mmol/L  FrStHsLb   Potassium 3.9 3.4 - 5.3 mmol/L  FrStHsLb   Chloride 104 94 - 109 mmol/L  FrStHsLb   Carbon Dioxide 25 20 - 32 mmol/L  FrStHsLb   Anion Gap 10 3 - 14 mmol/L  FrStHsLb   Glucose 93 70 - 99 mg/dL  FrStHsLb   Urea Nitrogen 21 7 - 30 mg/dL  FrStHsLb   Creatinine 1.32 0.66 - 1.25 mg/dL H FrStHsLb   GFR Estimate 52 >60 mL/min/1.7m2 L FrStHsLb   Comment:  Non  GFR Calc   GFR Estimate If Black 63 >60 mL/min/1.7m2  FrStHsLb   Comment:  African American GFR Calc   Calcium 9.2 8.5 - 10.1 mg/dL  FrStHsLb            CBC with platelets [[id number]] (Abnormal)  Resulted: 05/31/17 2201, Result status: Final result    Ordering provider: Jay Ovalle MD  05/31/17 1932 Resulting lab: [facility]    Specimen Information    Type Source Collected On   Blood  05/31/17 2154          Components       Value Reference Range Flag Lab   WBC 10.8 4.0 - 11.0 10e9/L  FrStHsLb   RBC Count 4.28 4.4 - 5.9 10e12/L L FrStHsLb   Hemoglobin 13.0 13.3 - 17.7 g/dL L FrStHsLb   Hematocrit 38.6 40.0 - 53.0 % L FrStHsLb   MCV 90 78 - 100 fl  FrStHsLb   MCH 30.4 26.5 - 33.0 pg  FrStHsLb   MCHC 33.7 31.5 - 36.5 g/dL  FrStHsLb   RDW 13.3 10.0 - 15.0 %  FrStHsLb   Platelet Count 287 150 - 450 10e9/L  FrStHsLb            Basic metabolic panel [[id number]] (Abnormal)  Resulted: 05/31/17 1700, Result status: Final result    Ordering provider: Trierweiler, Chad A, MD  05/31/17 1551 Resulting lab: [facility]    Specimen Information    Type Source Collected On   Blood  05/31/17 1620          Components       Value Reference Range Flag Lab   Sodium 137 133 - 144 mmol/L  StLb    Potassium 4.0 3.4 - 5.3 mmol/L  FrStHsLb   Chloride 102 94 - 109 mmol/L  FrStHsLb   Carbon Dioxide 27 20 - 32 mmol/L  FrStHsLb   Anion Gap 8 3 - 14 mmol/L  FrStHsLb   Glucose 86 70 - 99 mg/dL  FrStHsLb   Urea Nitrogen 21 7 - 30 mg/dL  FrStHsLb   Creatinine 1.33 0.66 - 1.25 mg/dL H FrStHsLb   GFR Estimate 52 >60 mL/min/1.7m2 L FrStHsLb   Comment:  Non  GFR Calc   GFR Estimate If Black 63 >60 mL/min/1.7m2  FrStHsLb   Comment:  African American GFR Calc   Calcium 9.7 8.5 - 10.1 mg/dL  FrStHsLb            *UA reflex to Microscopic [[id number]] (Abnormal)  Resulted: 05/31/17 1651, Result status: Final result    Ordering provider: Trierweiler, Chad A, MD  05/31/17 1633 Resulting lab: [facility] [place]    Specimen Information    Type Source Collected On     05/31/17 1633          Components       Value Reference Range Flag Lab   Color Urine Yellow   84730   Appearance Urine Clear   34501   Glucose Urine Negative NEG mg/dL  73202   Bilirubin Urine Negative NEG  42640   Ketones Urine 15 NEG mg/dL A 83500   Specific [place] Urine 1.015 1.003 - 1.035  68661   Blood Urine Negative NEG  52604   pH Urine 6.0 5.0 - 7.0 pH  02081   Protein Albumin Urine Negative NEG mg/dL  15006   Urobilinogen Urine 0.2 0.2 - 1.0 EU/dL  54535   Nitrite Urine Negative NEG  51869   Leukocyte Esterase Urine Trace NEG A 77297   Source Midstream Urine   67110            Urine Microscopic [[id number]] (Abnormal)  Resulted: 05/31/17 1651, Result status: Final result    Ordering provider: Trierweiler, Chad A, MD  05/31/17 1633 Resulting lab: [facility] [place]    Specimen Information    Type Source Collected On     05/31/17 1633          Components       Value Reference Range Flag Lab   WBC Urine 2-5 0 - 2 /HPF A 96510   RBC Urine O - 2 0 - 2 /HPF  73982   Bacteria Urine Few NEG /HPF A 30457            CBC with platelets differential [[id number]]  Resulted: 05/31/17 1651, Result status: Final result    Ordering  provider: Trierweiler, Chad A, MD  05/31/17 1551 Resulting lab: [facility]    Specimen Information    Type Source Collected On   Blood  05/31/17 1620          Components       Value Reference Range Flag Lab   WBC 10.5 4.0 - 11.0 10e9/L  FrStHsLb   RBC Count 4.75 4.4 - 5.9 10e12/L  FrStHsLb   Hemoglobin 14.4 13.3 - 17.7 g/dL  FrStHsLb   Hematocrit 43.2 40.0 - 53.0 %  FrStHsLb   MCV 91 78 - 100 fl  FrStHsLb   MCH 30.3 26.5 - 33.0 pg  FrStHsLb   MCHC 33.3 31.5 - 36.5 g/dL  FrStHsLb   RDW 13.3 10.0 - 15.0 %  FrStHsLb   Platelet Count 327 150 - 450 10e9/L  FrStHsLb   Diff Method Automated Method   FrStHsLb   % Neutrophils 76.2 %  FrStHsLb   % Lymphocytes 11.1 %  FrStHsLb   % Monocytes 11.2 %  FrStHsLb   % Eosinophils 1.0 %  FrStHsLb   % Basophils 0.2 %  FrStHsLb   % Immature Granulocytes 0.3 %  FrStHsLb   Nucleated RBCs 0 0 /100  FrStHsLb   Absolute Neutrophil 8.0 1.6 - 8.3 10e9/L  FrStHsLb   Absolute Lymphocytes 1.2 0.8 - 5.3 10e9/L  FrStHsLb   Absolute Monocytes 1.2 0.0 - 1.3 10e9/L  FrStHsLb   Absolute Eosinophils 0.1 0.0 - 0.7 10e9/L  FrStHsLb   Absolute Basophils 0.0 0.0 - 0.2 10e9/L  FrStHsLb   Abs Immature Granulocytes 0.0 0 - 0.4 10e9/L  FrStHsLb   Absolute Nucleated RBC 0.0   FrStHsLb            Testing Performed By     Lab - Abbreviation Name Director Address Valid Date Range    14 - FrStHsLb [facility] Unknown [address] [place] [address] 05/08/15 1057 - Present    [address] - Unknown [facility] [place] Unknown [address] [place] [address] 07/23/15 1146 - Present            Unresulted Labs     None         Imaging Results - 3 Days      XR Pelvis and Hip Bilateral 2 Views [[id number]]  Resulted: 06/01/17 1308, Result status: Final result    Ordering provider: Deion Ospina MD  06/01/17 0542 Resulted by: Tan Carrion MD    Performed: 06/01/17 1000 - 06/01/17 1025 Resulting lab: RADIOLOGY RESULTS    Narrative:       XR PELVIS AND HIP BILATERAL 2  VIEWS 6/1/2017 10:25 AM    COMPARISON: None.    HISTORY: Left hip pain.      Impression:       IMPRESSION: Severe left hip osteoarthritis with complete loss of joint  space and significant femoral head remodeling. No definite fractures  are seen. Mild degenerative changes in the right hip. Partially imaged  lumbar spinal fusion hardware.    KARRIE SALAZAR      Head CT w/o contrast [[id number]]  Resulted: 05/31/17 1833, Result status: Final result    Ordering provider: Trierweiler, Chad A, MD  05/31/17 1552 Resulted by: Stuart Partida MD    Performed: 05/31/17 1707 - 05/31/17 1718 Resulting lab: RADIOLOGY RESULTS    Narrative:       CT HEAD W/O CONTRAST   5/31/2017 5:18 PM     HISTORY: confusion    TECHNIQUE:  Axial images of the head without IV contrast material.  Radiation dose for this scan was reduced using automated exposure  control, adjustment of the mA and/or kV according to patient size, or  iterative reconstruction technique.    COMPARISON: None.    FINDINGS: The ventricles are normal in size, shape and configuration.  The brain parenchyma and subarachnoid spaces are normal. There is no  evidence of intracranial hemorrhage, mass, acute infarct or anomaly.  The visualized portions of the sinuses and mastoids appear normal.  There is no evidence of trauma.      Impression:       IMPRESSION:  No acute pathology, no bleed, mass, or acute infarcts are  seen.    JOSIAH PARTIDA MD      Testing Performed By     Lab - Abbreviation Name Director Address Valid Date Range    104 - Rad Rslts RADIOLOGY RESULTS Unknown Unknown 02/16/05 1553 - Present            Encounter-Level Documents:     There are no encounter-level documents.      Order-Level Documents:     There are no order-level documents.

## 2017-05-31 NOTE — DISCHARGE INSTRUCTIONS
Stop taking the Bactrim antibiotic.    Consider also stopping the Oxycodone today also, because this could be the cause of your hallucinations also.  If you choose not to stop the Oxycodone today, then if the hallucinations persist after you stop the Bactrim, then you will need to stop the Oxycodone also and go back on the Vicodin and discuss alternative pain medication with your doctor.    Have someone stay with you tonight.    Do not drive a car until after your hallucinations have been resolved for at least 48 hours.

## 2017-05-31 NOTE — IP AVS SNAPSHOT
MRN:[id number]                      After Visit Summary   5/31/2017    Deion Diaz    MRN: [id number]           Thank you!     Thank you for choosing [facility] for your care. Our goal is always to provide you with excellent care. Hearing back from our patients is one way we can continue to improve our services. Please take a few minutes to complete the written survey that you may receive in the mail after you visit with us. Thank you!        Patient Information     Date Of Birth          1940        Designated Caregiver       Most Recent Value    Caregiver    Will someone help with your care after discharge? yes    Name of designated caregiver ying Pitt    Phone number of caregiver does not know phone number    Caregiver address [place], [place]      About your hospital stay     You were admitted on:  May 31, 2017 You last received care in the:  [facility] [address] [facility]    You were discharged on:  Yanet 3, 2017        Reason for your hospital stay       Further evaluation of hallucinations due to medication side effect.                  Who to Call     For medical emergencies, please call 911.  For non-urgent questions about your medical care, please call your primary care provider or clinic, [phone]          Attending Provider     Provider Specialty    Trierweiler, Chad A, MD Emergency Medicine    Jay Ovalle MD Internal Medicine    [facility]       Primary Care Provider Office Phone # Fax #    Jerrod Zion Graves [phone]      After Care Instructions     Activity - Up with nursing assistance           Additional Discharge Instructions       CPAP home settings every night.            Advance Diet as Tolerated       Follow this diet upon discharge: Orders Placed This Encounter      Regular Diet Adult            Daily weights       Call Provider for weight gain of more than 2 pounds per day or 5 pounds  "per week.            Fall precautions           General info for SNF       Length of Stay Estimate: Short Term Care: Estimated # of Days <30  Condition at Discharge: Improving  Level of care:skilled   Rehabilitation Potential: Fair  Admission H&P remains valid and up-to-date: Yes  Recent Chemotherapy: N/A  Use Nursing Home Standing Orders: Yes            Intake and output       Every shift            Mantoux instructions       Give two-step Mantoux (PPD) Per Facility Policy Yes                  Follow-up Appointments     Follow Up and recommended labs and tests       Follow up with detention physician.  The following labs/tests are recommended: BMP. Increased lasix prior to discharge, adjust as necessary to aid with lower extremity edema and wound healing.                  Additional Services     Physical Therapy Adult Consult       Evaluate and treat as clinically indicated.    Reason:  Deconditioned and severe left hip arthritis.                  Pending Results     Date and Time Order Name Status Description    6/2/2017 1528 EKG 12-lead, tracing only Preliminary             Statement of Approval     Ordered          06/03/17 1118  I have reviewed and agree with all the recommendations and orders detailed in this document.  EFFECTIVE NOW     Approved and electronically signed by:  Ciera Mccauley MD             Admission Information     Date & Time Provider Department Dept. Phone    5/31/2017 Ciera Mccauley MD [facility] [address] [facility] [phone]      Your Vitals Were     Blood Pressure Pulse Temperature Respirations Height Pulse Oximetry    117/59 (BP Location: Left arm) 93 98.3  F (36.8  C) (Oral) 18 1.651 m (5' 5\") 90%    BMI (Body Mass Index)                   33.28 kg/m2           MyChart Information     [employer] gives you secure access to your electronic health record. If you see a primary care provider, you can also send messages to your care team and make appointments. If " you have questions, please call your primary care clinic.  If you do not have a primary care provider, please call [phone] and they will assist you.        Care EveryWhere ID     This is your Care EveryWhere ID. This could be used by other organizations to access your [facility] medical records  [id number]           Review of your medicines      START taking        Dose / Directions    acetaminophen 325 MG tablet   Commonly known as:  TYLENOL   Used for:  Other chronic pain        Dose:  650 mg   Take 2 tablets (650 mg) by mouth 3 times daily   Quantity:  100 tablet   Refills:  0       buPROPion 150 MG 24 hr tablet   Commonly known as:  WELLBUTRIN XL   Used for:  Hallucinations   Replaces:  buPROPion 150 MG 12 hr tablet        Dose:  150 mg   Take 1 tablet (150 mg) by mouth daily for 7 days   Quantity:  7 tablet   Refills:  0       HYDROcodone-acetaminophen 5-325 MG per tablet   Commonly known as:  NORCO   Used for:  Other chronic pain        Dose:  1 tablet   Take 1 tablet by mouth every 4 hours as needed for moderate to severe pain   Quantity:  21 tablet   Refills:  0       * QUEtiapine 25 MG tablet   Commonly known as:  SEROquel   Used for:  Hallucinations        Dose:  25 mg   Take 1 tablet (25 mg) by mouth At Bedtime for 7 days   Quantity:  7 tablet   Refills:  0       * QUEtiapine 25 MG tablet   Commonly known as:  SEROquel   Used for:  Hallucinations        Dose:  12.5 mg   Take 0.5 tablets (12.5 mg) by mouth every 6 hours as needed (halucinations)   Quantity:  14 tablet   Refills:  0       * Notice:  This list has 2 medication(s) that are the same as other medications prescribed for you. Read the directions carefully, and ask your doctor or other care provider to review them with you.      CONTINUE these medicines which may have CHANGED, or have new prescriptions. If we are uncertain of the size of tablets/capsules you have at home, strength may be listed as something that might have changed.         Dose / Directions    furosemide 40 MG tablet   Commonly known as:  LASIX   This may have changed:    - medication strength  - how much to take  - additional instructions   Used for:  Localized edema        Dose:  40 mg   Start taking on:  6/4/2017   Take 1 tablet (40 mg) by mouth daily Hold for SBP<100   Quantity:  7 tablet   Refills:  0       gabapentin 300 MG capsule   Commonly known as:  NEURONTIN   This may have changed:  medication strength   Used for:  Other chronic pain        Dose:  300 mg   Take 1 capsule (300 mg) by mouth At Bedtime for 7 days   Quantity:  7 capsule   Refills:  0         CONTINUE these medicines which have NOT CHANGED        Dose / Directions    albuterol 108 (90 BASE) MCG/ACT Inhaler   Commonly known as:  PROAIR HFA/PROVENTIL HFA/VENTOLIN HFA        Dose:  2 puff   Inhale 2 puffs into the lungs every 6 hours as needed   Refills:  0       ALLOPURINOL PO        Dose:  100 mg   Take 100 mg by mouth daily   Refills:  0       ATENOLOL PO        Dose:  50 mg   Take 50 mg by mouth daily   Refills:  0       ATORVASTATIN CALCIUM PO        Dose:  20 mg   Take 20 mg by mouth daily   Refills:  0       FINASTERIDE PO        Dose:  5 mg   Take 5 mg by mouth daily   Refills:  0       fluticasone-salmeterol 500-50 MCG/DOSE diskus inhaler   Commonly known as:  ADVAIR        Dose:  1 puff   Inhale 1 puff into the lungs every 12 hours   Refills:  0       Multi-vitamin Tabs tablet        Dose:  1 tablet   Take 1 tablet by mouth daily   Refills:  0       NITROGLYCERIN SL        Dose:  0.4 mg   Place 0.4 mg under the tongue   Refills:  0       NONFORMULARY        Hydrocortisone 2.5% cream mixed 1:1 with Vanicream apply BID from neck down to affected area   Refills:  0       ROLAIDS PO        Take by mouth as needed   Refills:  0       TAMSULOSIN HCL PO        Dose:  0.4 mg   Take 0.4 mg by mouth At Bedtime   Refills:  0         STOP taking     buPROPion 150 MG 12 hr tablet   Commonly known as:  WELLBUTRIN SR    Replaced by:  buPROPion 150 MG 24 hr tablet           OXYCODONE HCL PO                Where to get your medicines      Some of these will need a paper prescription and others can be bought over the counter. Ask your nurse if you have questions.     Bring a paper prescription for each of these medications     buPROPion 150 MG 24 hr tablet    gabapentin 300 MG capsule    HYDROcodone-acetaminophen 5-325 MG per tablet    QUEtiapine 25 MG tablet    QUEtiapine 25 MG tablet       You don't need a prescription for these medications     acetaminophen 325 MG tablet    furosemide 40 MG tablet                Protect others around you: Learn how to safely use, store and throw away your medicines at www.disposemymeds.org.             Medication List: This is a list of all your medications and when to take them. Check marks below indicate your daily home schedule. Keep this list as a reference.      Medications           Morning Afternoon Evening Bedtime As Needed    acetaminophen 325 MG tablet   Commonly known as:  TYLENOL   Take 2 tablets (650 mg) by mouth 3 times daily   Last time this was given:  650 mg on 6/3/2017  3:35 PM                                albuterol 108 (90 BASE) MCG/ACT Inhaler   Commonly known as:  PROAIR HFA/PROVENTIL HFA/VENTOLIN HFA   Inhale 2 puffs into the lungs every 6 hours as needed                                ALLOPURINOL PO   Take 100 mg by mouth daily   Last time this was given:  100 mg on 6/3/2017  8:38 AM                                ATENOLOL PO   Take 50 mg by mouth daily   Last time this was given:  50 mg on 6/3/2017  8:38 AM                                ATORVASTATIN CALCIUM PO   Take 20 mg by mouth daily                                buPROPion 150 MG 24 hr tablet   Commonly known as:  WELLBUTRIN XL   Take 1 tablet (150 mg) by mouth daily for 7 days   Last time this was given:  150 mg on 6/3/2017  8:38 AM                                FINASTERIDE PO   Take 5 mg by mouth daily                                 fluticasone-salmeterol 500-50 MCG/DOSE diskus inhaler   Commonly known as:  ADVAIR   Inhale 1 puff into the lungs every 12 hours   Last time this was given:  1 puff on 6/3/2017  8:39 AM                                furosemide 40 MG tablet   Commonly known as:  LASIX   Take 1 tablet (40 mg) by mouth daily Hold for SBP<100   Start taking on:  6/4/2017   Last time this was given:  20 mg on 6/3/2017 10:11 AM                                gabapentin 300 MG capsule   Commonly known as:  NEURONTIN   Take 1 capsule (300 mg) by mouth At Bedtime for 7 days   Last time this was given:  300 mg on 6/2/2017  9:06 PM                                HYDROcodone-acetaminophen 5-325 MG per tablet   Commonly known as:  NORCO   Take 1 tablet by mouth every 4 hours as needed for moderate to severe pain   Last time this was given:  1 tablet on 6/3/2017  4:05 PM                                Multi-vitamin Tabs tablet   Take 1 tablet by mouth daily                                NITROGLYCERIN SL   Place 0.4 mg under the tongue                                NONFORMULARY   Hydrocortisone 2.5% cream mixed 1:1 with Vanicream apply BID from neck down to affected area                                * QUEtiapine 25 MG tablet   Commonly known as:  SEROquel   Take 1 tablet (25 mg) by mouth At Bedtime for 7 days   Last time this was given:  25 mg on 6/2/2017  9:06 PM                                * QUEtiapine 25 MG tablet   Commonly known as:  SEROquel   Take 0.5 tablets (12.5 mg) by mouth every 6 hours as needed (halucinations)   Last time this was given:  25 mg on 6/2/2017  9:06 PM                                ROLAIDS PO   Take by mouth as needed                                TAMSULOSIN HCL PO   Take 0.4 mg by mouth At Bedtime   Last time this was given:  0.4 mg on 6/2/2017  9:06 PM                                * Notice:  This list has 2 medication(s) that are the same as other medications prescribed for you. Read  the directions carefully, and ask your doctor or other care provider to review them with you.

## 2017-05-31 NOTE — IP AVS SNAPSHOT
[facility] [address] [facility]    [address] [place] [address]    Phone:  [phone]                                       After Visit Summary   5/31/2017    Deion Diaz    MRN: [id number]           After Visit Summary Signature Page     I have received my discharge instructions, and my questions have been answered. I have discussed any challenges I see with this plan with the nurse or doctor.    ..........................................................................................................................................  Patient/Patient Representative Signature      ..........................................................................................................................................  Patient Representative Print Name and Relationship to Patient    ..................................................               ................................................  Date                                            Time    ..........................................................................................................................................  Reviewed by Signature/Title    ...................................................              ..............................................  Date                                                            Time

## 2017-05-31 NOTE — ED NOTES
"[facility]  ED Nurse Handoff Report    ED Chief complaint: Hallucinations (seen here yesterday for the same, sent home, told to stop his bactrim. continued his oxycodon but stopped bactrim yesterday, still hallucinating, seeing 'little boys behind the tv' did not want to come, placed on [place] by PD  )      ED Diagnosis:   Final diagnoses:   Hallucinations   Medication reaction, initial encounter - possible       Code Status: see epic  Allergies: No Known Allergies    Activity level - Baseline/Home:  Lives in a condo, has left hip fracture. Uses w/c and transfers himself into w/c  Activity Level - Current:   1-2 assist into chair     Needed?: No    Isolation: No  Infection: Not Applicable    Bariatric?: No    Vital Signs:   Vitals:    05/31/17 1517   BP: (!) 133/92   Resp: 20   Temp: 98  F (36.7  C)   TempSrc: Oral   SpO2: 95%   Height: 1.651 m (5' 5\")       Cardiac Rhythm: ,        Pain level: 0-10 Pain Scale: 10    Is this patient confused?: alert and oriented but having visual hallucinations    Patient Report: Initial Complaint: Pt was started on bactrim for UTI on 5/23 and was also started on oxycodone at that time because his vicodin was not enough to help him sleep at night, due to left hip pain. PCP has since called pt and stated stop bactrim, because UC was negative, however pt continued bactrim.  pt was seen in ED yesterday for having visual hallucinations, work up was negative and pt was encouraged to stop his bactrim and then his oxycodone if still hallucinating and d/c to home with his son. Since then, last night and this am pt has continued to hallucinate seeing people in his condo, that no one else sees. This am pt fell out of his w/c and called FD to help him into chair. Later this afternoon, he called EMS because he was hallucinating. Pt refused to come to ED, but EMS didn't feel he was safe due to weakness and hallucinations, so PD placed pt on a [place].   Focused Assessment: " alert and oriented, recognizes that he is hallucinating but feels that his visions are very real. Pt stopped his bactrim yesterday , but has continued his oxycodone because it is the only thing that helps him sleep at night. Transfers with 1-2 to bedside chair.   Tests Performed: labs, UA, head CT  Abnormal Results: crt^  Treatments provided: none    Family Comments: family at bedside    OBS brochure/video discussed/provided to patient: yes    ED Medications:   Medications   sodium chloride (PF) 0.9% PF flush 3 mL (not administered)   sodium chloride (PF) 0.9% PF flush 3 mL (not administered)       Drips infusing?:  No      ED NURSE PHONE NUMBER: [phone]

## 2017-05-31 NOTE — ED PROVIDER NOTES
History     Chief Complaint:  Hallucinations    HPI   Deion Diaz is a 77 year old male with a history of CKD who presents to the emergency department today for evaluation of hallucination for the past 2 days after starting both Bactrim and Oxycodone on 5/23. He is seeing people in his home who are not really there.  He reports to me that when he tried to touch them, they would disappear.  The patient was diagnosed with UTI on 5/23 and placed on Bactrim. Of note, the family state the patient was also started on oxycodone on 5/23 for chronic left hip pain.  He states that the Vicodin that he had been taking previously was not working at night for his pain, so his doctor switched him to Oxycodone and he was finally able to get a good night sleep last night after taking the Oxycodone.  Today, the clinic called and told patient that his urine culture was negative and he can stop the bactrim. The nurse called the son after the patient continued to have hallucinations prompting visit today. Here, the patient states he would like to stay on oxycodone as this has improve his hip pain. No fevers, vomiting, headache, recent trauma, dysuria, hematuria, urinary frequency or abdominal pain.    Allergies:  NKDA     Medications:    Fluticasone-salmeterol (advair) 500-50 mcg/dose diskus inhaler  Albuterol (proair hfa, proventil hfa, ventolin hfa) 108 (90 base) mcg/act inhaler  Allopurinol po  Atenolol po  Atorvastatin calcium po  Bupropion hcl po  Fluticasone (veramyst) 27.5 mcg/spray nasal spray  Gabapentin po  Hydrocodone-acetaminophen (norco) 5-325 mg per tablet  Aspirin po  Furosemide po  Multivitamin, therapeutic with minerals (multi-vitamin) tabs  Nitroglycerin sl  Tamsulosin (flomax) 0.4 mg 24 hr capsule    Past Medical History:    CKD  Emphysema of lung    Past Surgical History:    Surgical history reviewed. No pertinent surgical history.    Family History:    History reviewed. No pertinent family history.    Social  "History:  Tobacco: Former Smoker  Alcohol: Positive  Marital Status:   [4]  Presents with son and daughter.   PCP: Jerrod Graves    Review of Systems   Constitutional: Negative for fever.   Gastrointestinal: Negative for abdominal pain and vomiting.   Psychiatric/Behavioral: Positive for hallucinations.   All other systems reviewed and are negative.    Physical Exam   First Vitals:  Patient Vitals for the past 24 hrs:   BP Temp Temp src Pulse Resp SpO2 Height Weight   05/30/17 2028 119/77 - - - - 93 % - -   05/30/17 1826 (!) 174/153 97.8  F (36.6  C) Oral 77 20 95 % 1.651 m (5' 5\") 90.7 kg (200 lb)     Physical Exam    Nursing note and vitals reviewed.  Constitutional:  Appears well-developed and well-nourished.   HENT:   Head:    Atraumatic.   Mouth/Throat:   Oropharynx is clear and moist. No oropharyngeal exudate.   Eyes:    Pupils are equal, round, and reactive to light.   Neck:    Normal range of motion. Neck supple.      No tracheal deviation present. No thyromegaly present.   Cardiovascular:  Normal rate, regular rhythm, no murmur   Pulmonary/Chest: Breath sounds are clear and equal without wheezes or crackles.  Abdominal:   Soft. Bowel sounds are normal. Exhibits no distension and      no mass. There is no tenderness.      There is no rebound and no guarding.   Musculoskeletal:  Exhibits no edema.   Lymphadenopathy:  No cervical adenopathy.   Neurological:   Alert and oriented to person, place, and time. GCS 15.  CN 2-12 intact.  and proximal upper extremity strength intact and equal.  Bilateral lower extremity strength intact and equal, including strong dorsiflexion and plantarflexion strength.  Sensation intact and equal to the face, arms and legs.  No facial droop or weakness. Normal speech.  Follows commands and answers questions normally.    Skin:    Skin is warm and dry. No rash noted. No pallor.   Emergency Department Course   Laboratory:  BMP: GFR 57 o/w WNL. (Creatinine " 1.22)    UA: Ketone trace, Leukocyte trace. WBC/HPF O-2 RBC/HPF O-2.     Emergency Department Course:  Nursing notes and vitals reviewed.    I performed an exam of the patient as documented above.     Blood drawn. Urine collected. This was sent to the lab for further testing, results above.    Recheck patient. Findings and plan explained to the Patient. Patient discharged home with instructions regarding supportive care, medications, and reasons to return. The importance of close follow-up was reviewed.    Impression & Plan    Medical Decision Making:  Deion Diaz is a 77 year old male found to have hallucinations which I feel are medically related as I rechecked his urine today and there are no signs of infection. I discussed with him that he stop both the Bactrim and oxycodone as these were both started the same day prior to onset of hallucinations, but he states here he does not want to stop the oxycodone because the Vicodin did not work for his pain and the oxycodone worked for his pain. He was able to get a good night sleep last night because of the oxycodone. I discussed with him that he definitely needs to stop taking the Bactrim and that it is reasonable to continue the oxycodone to see if the hallucinations stop after stopping the Bactrim and getting sleep since insomnia can also cause hallucination especially in the elderly. I discussed with the patient and his family that he should be watched closely. I recommended someone stay with him. He was also told that he should stop the oxycodone if the hallucination do not resolve after 48 hours. Since he was not currently hallucination and he was completely alert and communicating normally, I do not feel he required hospitalization since people can develop hallucinations at sundown in the hospital as well. I instructed the patient and his family he should return here if his condition worsens and we would change the plan. I do not feel there was any signs of  intracranial bleeding and I did check his sodium which was also normal. I feel the could be safely discharge home. He also has no sign of infection or sepsis. I felt the Bactrim could be safely stopped since his doctor's office called and stated that he did not need to be on any antibiotic at this time.    Diagnosis:    ICD-10-CM    1. Hallucinations R44.3 Basic metabolic panel       Disposition:  discharged to home      Scribe Disclosure:  I, Linh Hernandez, am serving as a scribe at 7:24 PM on 5/30/2017 to document services personally performed by Corie Garza MD, based on my observations and the provider's statements to me.  Linh Hernandez  5/30/2017    EMERGENCY DEPARTMENT       Corie Garza MD  05/31/17 0300

## 2017-05-31 NOTE — IP AVS SNAPSHOT
` `     [place] [address] [facility]: [phone]                 INTERAGENCY TRANSFER FORM - NOTES (H&P, Discharge Summary, Consults, Procedures, Therapies)   2017                    Hospital Admission Date: 2017  DEION ALMARAZ   : 1940  Sex: Male        Patient PCP Information     Provider PCP Type    Jerrod Graves MD General      History & Physicals     No notes of this type exist for this encounter.         Discharge Summaries      Discharge Summaries by Ciera Mccauley MD at 6/3/2017 11:37 AM     Author:  Ciera Mccauley MD Service:  Hospitalist Author Type:  Physician    Filed:  6/3/2017 11:37 AM Date of Service:  6/3/2017 11:37 AM Creation Time:  6/3/2017 11:19 AM    Status:  Signed :  Ciera Mccauley MD (Physician)         [facility]    Discharge Summary  Hospitalist    Date of Admission:  2017  Date of Discharge:[SN1.1]  6/3/2017[SN1.2]  Discharging Provider: Ciera Mccauley MD  Date of Service (when I saw the patient):[SN1.1] 17[SN1.2]    Discharge Diagnoses   Hallucinations and delirium due to oxycodone use and sleep cycle dysregulation  Chronic left hip pain due to severe arthritis  Chronic lower extremity edema with superficial wounds  Hx COPD  Deconditioned    History of Present Illness   Please see admission H&P for full details.    Hospital Course   Deion Almaraz was admitted on 2017.  The following problems were addressed during his hospitalization:    1. Hallucinations and delirium due to narcotic use and sleep deprived.  Concurrent initiation of Bactrim and upgrading from Vicodin to oxycodone.  Bactrim and oxycodone discontinued on admission.  CT head without acute findings. Lab studies and vital signs not suggestive of infection. UA only with 2-5 WBC and few bacteria, trace LE and negative nitrite. Difficult time managing left hip pain as he is in the process of being evaluated as an OP for hip  surgery, however due to LE wounds this has been delayed. Acutely agitated and combative 6/2 morning, code 21 called and required IM haldol with sitter. Patient states this was due to lack of sleep and not getting Norco which provides him with relief enough to sleep. Patient normally sleeps seated due to left hip pain. Daughter assisted with establishing baseline, other than acute agitation/delirium 6/2 morning he remained oriented x 3 and followed all commands. On discharge day he remained hemodynamically stable and did not require IV/IM antipsychotics nor a sitter for 24 hours. He states occasionally seeing animals but admits to having floaters for many years which may obscuring his vision at baseline. Psychiatry did see the patient and changed Wellbutrin to long acting and started seroquel at night, along with prn during days. EKG showed a normal QTc. As he is compliant with treatment and hallucinations are improving with each day, it is felt he is safe for discharge to TCU. Patient required extensive counseling regarding the benefits of TCU stay. He wants to return home but is an assist of two, and eventually agreed to TCU after discussing with pt and daughter at bedside. Since he is motivated to have hip surgery he will require condition training to allow for LE wound healing and he understands this now.      2.  Worsening left hip pain:  No recent trauma.  Clearly this is the driving force behind his narcotic use as well as with difficulty even raising his legs for treating his lower extremity edema. No more oxycodone. Orthopedics consult appreciated, XR hip without acute fractures. Management as above. Continue scheduled Tylenol. Cautious use of vicodin as he has tolerated this in the past. Patient does not feel lidoderm patch provided relief thus stopped. Continued on PTA gabapentin at night.     3.  Coronary artery disease:  No chest pain.    - PTA atenolol.  - Resume statin at discharge      4.  Lower  extremity edema with superficial wounds: Appreciate Wound Care consult, please see their recommendations. Encourage elevate legs as much as possible. Given extra dose of lasix and renal function/electrolytes tolerated this. As such, have increased oral daily lasix at discharge. Recommend TCU MD to recheck BMP and adjust lasix as clinically indicated.      5.  BPH:  PTA finasteride and tamsulosin.       6.  Chronic obstructive pulmonary disease:  Breathing at baseline.    - PTA Advair and p.r.n. albuterol.   - PTA CPAP home settings.    Active Problems:    Failure to thrive in adult    Hallucination, drug-induced (H)    Ciera Mccauley MD    Significant Results and Procedures   No procedures on this admission.    Pending Results   These results will be followed up by TCU MD.  Unresulted Labs Ordered in the Past 30 Days of this Admission     No orders found from 4/1/2017 to 6/1/2017.        Code Status   Full Code       Primary Care Physician   Jerrod Graves    Physical Exam   Temp: 98.3  F (36.8  C) Temp src: Oral BP: 117/59 Pulse: 93 Heart Rate: 83 Resp: 18 SpO2: 90 % O2 Device: None (Room air)    There were no vitals filed for this visit.  Vital Signs with Ranges  Temp:  [97.6  F (36.4  C)-98.3  F (36.8  C)] 98.3  F (36.8  C)  Pulse:  [83-93] 93  Heart Rate:  [83] 83  Resp:  [18-20] 18  BP: (117-133)/(59-73) 117/59  SpO2:  [90 %-93 %] 90 %  I/O last 3 completed shifts:  In: 270 [P.O.:270]  Out: 650 [Urine:650]    Constitutional: Awake, alert, cooperative, no apparent distress  Respiratory: Clear to auscultation bilaterally, no crackles or wheezing  Cardiovascular: Regular rate and rhythm, normal S1 and S2, and no murmur noted  GI: Normal bowel sounds, soft, non-distended, non-tender, obese abd  Skin/Integumen: LE edema up to knees b/l 2+, wounds noted and nondraining, otherwise unchanged from previous.   Other: Oriented x 3. Follows all commands. Strength 5/5 throughout except at left hip due to pain.  CN2-12 grossly intact.    Discharge Disposition   Discharged to nursing home  Condition at discharge: Satisfactory    Consultations This Hospital Stay   SOCIAL WORK IP CONSULT  CARE COORDINATOR IP CONSULT  PHYSICAL THERAPY ADULT IP CONSULT  ORTHOPEDICS IP CONSULT  WOUND OSTOMY CONTINENCE NURSE  IP CONSULT  PSYCHIATRY IP CONSULT  PSYCHIATRY IP CONSULT  PHYSICAL THERAPY ADULT IP CONSULT    Time Spent on this Encounter   TERESA Ciera Barronargelia, personally saw the patient today and spent greater than 30 minutes discharging this patient.    Discharge Orders     General info for SNF   Length of Stay Estimate: Short Term Care: Estimated # of Days <30  Condition at Discharge: Improving  Level of care:skilled   Rehabilitation Potential: Fair  Admission H&P remains valid and up-to-date: Yes  Recent Chemotherapy: N/A  Use Nursing Home Standing Orders: Yes     Mantoux instructions   Give two-step Mantoux (PPD) Per Facility Policy Yes     Reason for your hospital stay   Further evaluation of hallucinations due to medication side effect.     Intake and output   Every shift     Daily weights   Call Provider for weight gain of more than 2 pounds per day or 5 pounds per week.     Follow Up and recommended labs and tests   Follow up with assisted physician.  The following labs/tests are recommended: BMP. Increased lasix prior to discharge, adjust as necessary to aid with lower extremity edema and wound healing.     Activity - Up with nursing assistance     Additional Discharge Instructions   CPAP home settings every night.     Full Code     Physical Therapy Adult Consult   Evaluate and treat as clinically indicated.    Reason:  Deconditioned and severe left hip arthritis.     Fall precautions     Advance Diet as Tolerated   Follow this diet upon discharge: Orders Placed This Encounter     Regular Diet Adult       Discharge Medications   Current Discharge Medication List      START taking these medications    Details   acetaminophen  (TYLENOL) 325 MG tablet Take 2 tablets (650 mg) by mouth 3 times daily  Qty: 100 tablet    Associated Diagnoses: Other chronic pain      buPROPion (WELLBUTRIN XL) 150 MG 24 hr tablet Take 1 tablet (150 mg) by mouth daily for 7 days  Qty: 7 tablet, Refills: 0    Associated Diagnoses: Hallucinations      HYDROcodone-acetaminophen (NORCO) 5-325 MG per tablet Take 1 tablet by mouth every 4 hours as needed for moderate to severe pain  Qty: 21 tablet, Refills: 0    Associated Diagnoses: Other chronic pain      !! QUEtiapine (SEROQUEL) 25 MG tablet Take 1 tablet (25 mg) by mouth At Bedtime for 7 days  Qty: 7 tablet, Refills: 0    Associated Diagnoses: Hallucinations      !! QUEtiapine (SEROQUEL) 25 MG tablet Take 0.5 tablets (12.5 mg) by mouth every 6 hours as needed (halucinations)  Qty: 14 tablet, Refills: 0    Associated Diagnoses: Hallucinations       !! - Potential duplicate medications found. Please discuss with provider.      CONTINUE these medications which have CHANGED    Details   furosemide (LASIX) 40 MG tablet Take 1 tablet (40 mg) by mouth daily Hold for SBP<100  Qty: 7 tablet, Refills: 0    Associated Diagnoses: Localized edema      gabapentin (NEURONTIN) 300 MG capsule Take 1 capsule (300 mg) by mouth At Bedtime for 7 days  Qty: 7 capsule, Refills: 0    Associated Diagnoses: Other chronic pain         CONTINUE these medications which have NOT CHANGED    Details   NONFORMULARY Hydrocortisone 2.5% cream mixed 1:1 with Vanicream apply BID from neck down to affected area      FINASTERIDE PO Take 5 mg by mouth daily      Ca Carbonate-Mag Hydroxide (ROLAIDS PO) Take by mouth as needed      TAMSULOSIN HCL PO Take 0.4 mg by mouth At Bedtime      fluticasone-salmeterol (ADVAIR) 500-50 MCG/DOSE diskus inhaler Inhale 1 puff into the lungs every 12 hours      albuterol (PROAIR HFA, PROVENTIL HFA, VENTOLIN HFA) 108 (90 BASE) MCG/ACT inhaler Inhale 2 puffs into the lungs every 6 hours as needed       ALLOPURINOL PO Take  100 mg by mouth daily       ATENOLOL PO Take 50 mg by mouth daily       multivitamin, therapeutic with minerals (MULTI-VITAMIN) TABS Take 1 tablet by mouth daily      NITROGLYCERIN SL Place 0.4 mg under the tongue      ATORVASTATIN CALCIUM PO Take 20 mg by mouth daily          STOP taking these medications       buPROPion (WELLBUTRIN SR) 150 MG 12 hr tablet Comments:   Reason for Stopping:         OXYCODONE HCL PO Comments:   Reason for Stopping:             Allergies   No Known Allergies  Data   Most Recent 3 CBC's:[SN1.1]  Recent Labs   Lab Test  05/31/17   2154  05/31/17   1620  08/24/16   1201   WBC  10.8  10.5  17.1*   HGB  13.0*  14.4  14.6   MCV  90  91  92   PLT  287  327  229[SN1.2]      Most Recent 3 BMP's:[SN1.1]  Recent Labs   Lab Test  06/03/17   0816  06/01/17   0744  05/31/17   1620   NA  142  139  137   POTASSIUM  3.5  3.9  4.0   CHLORIDE  107  104  102   CO2  26  25  27   BUN  30  21  21   CR  1.19  1.32*  1.33*   ANIONGAP  9  10  8   LOU  9.2  9.2  9.7   GLC  195*  93  86[SN1.2]     Most Recent 2 LFT's:[SN1.1]No lab results found.[SN1.2]  Most Recent INR's and Anticoagulation Dosing History:  Anticoagulation Dose History     There is no flowsheet data to display.        Most Recent 3 Troponin's:[SN1.1]No lab results found.[SN1.2]  Most Recent Cholesterol Panel:[SN1.1]No lab results found.[SN1.2]  Most Recent 6 Bacteria Isolates From Any Culture (See EPIC Reports for Culture Details):[SN1.1]  Recent Labs   Lab Test  08/24/16   0624   CULT  >100,000 colonies/mL Klebsiella pneumoniae*[SN1.2]     Most Recent TSH, T4 and A1c Labs:[SN1.1]No lab results found.  Results for orders placed or performed during the hospital encounter of 05/31/17   Head CT w/o contrast    Narrative    CT HEAD W/O CONTRAST   5/31/2017 5:18 PM     HISTORY: confusion    TECHNIQUE:  Axial images of the head without IV contrast material.  Radiation dose for this scan was reduced using automated exposure  control, adjustment of the  "mA and/or kV according to patient size, or  iterative reconstruction technique.    COMPARISON: None.    FINDINGS: The ventricles are normal in size, shape and configuration.  The brain parenchyma and subarachnoid spaces are normal. There is no  evidence of intracranial hemorrhage, mass, acute infarct or anomaly.  The visualized portions of the sinuses and mastoids appear normal.  There is no evidence of trauma.      Impression    IMPRESSION:  No acute pathology, no bleed, mass, or acute infarcts are  seen.    JOSIAH PARTIDA MD   XR Pelvis and Hip Bilateral 2 Views    Narrative    XR PELVIS AND HIP BILATERAL 2 VIEWS 6/1/2017 10:25 AM    COMPARISON: None.    HISTORY: Left hip pain.      Impression    IMPRESSION: Severe left hip osteoarthritis with complete loss of joint  space and significant femoral head remodeling. No definite fractures  are seen. Mild degenerative changes in the right hip. Partially imaged  lumbar spinal fusion hardware.    KARRIE SALAZAR[SN1.2]        Revision History        User Key Date/Time User Provider Type Action    > SN1.2 6/3/2017 11:37 AM Ciera Mccauley MD Physician Sign     SN1.1 6/3/2017 11:19 AM Ciera Mccauley MD Physician                      Consult Notes      Consults signed by Campos Sage MD at 6/3/2017  6:31 AM      Author:  Campos Sage MD Service:  Psychiatry Author Type:  Physician    Filed:  6/3/2017  6:31 AM Date of Service:  6/1/2017  8:10 AM Creation Time:  6/1/2017  8:49 AM    Status:  Signed :  Campos Sage MD (Physician)         PSYCHIATRY CONSULTATION       REQUESTING PHYSICIAN:  Dr. Jay Ovalle      REASON FOR CONSULTATION:  Hallucinations.         IDENTIFYING DATA:  Mr. Deion Diaz is a 77-year-old   male admitted with hallucinations.        CHIEF COMPLAINT:  \"I'm seeing birds and it looks foggy.\"       HISTORY OF PRESENT ILLNESS:  Deion is a 77-year-old man with a notable history of chronic kidney " "disease, COPD, hypertension, hyperlipidemia, BPH and recurrent UTIs, along with lower extremity edema and ulcerations, comes to the emergency room with hallucinations.  He was seen in the ER and sent home and again came back.  He has been dealing with hip issues and had recent changes in his pain medications, had been on Vicodin at one point.  He also got moved over to [facility] and they treated a UTI with Bactrim.  He continues to report these hallucinations and says that there were children outside of his residence.  He is not hearing voices, not feeling depressed or reporting thoughts of self-harm.  He notes that he has been on Wellbutrin  mg b.i.d. for a few years, initially to treat smoking cessation, but he tells me that his kids told him \"it made my mood better, so I kept taking it.\"  He denies other psychiatric symptoms at this time other than indicating for the last several days he has not been sleeping well at all.      On further questioning, he denies a history of antoine, psychosis, generalized anxiety disorder, panic disorder, obsessive-compulsive disorder, eating disorder history, trauma history or ADHD.  He has never been hospitalized for psychiatric reasons and as far as I know, he has never been on other antidepressants with the exception of the Wellbutrin.      PAST PSYCHIATRIC HISTORY:  As above.      PAST CHEMICAL DEPENDENCY HISTORY:  Notable for some social use of alcohol.  He denies previous treatment or excessive use.      PAST MEDICAL HISTORY:  Per chart review includes COPD, osteoarthritis, BPH, restless leg syndrome, recurrent UTIs, cataracts, chronic kidney disease, stage III, obstructive sleep apnea, hypertension, hyperlipidemia, coronary artery disease, back surgery, TURP, tonsillectomy, coronary stent 2013.      MEDICATIONS ON ADMISSION:  Listed, Wellbutrin  mg b.i.d., finasteride, oxycodone, tamsulosin, Lasix, multivitamin, Advair, albuterol, allopurinol, atenolol, " "atorvastatin and gabapentin 300 mg each day at bedtime.      FAMILY HISTORY:  Denies mental illness, chemical dependency or suicide.      SOCIAL HISTORY:  The patient came from a family of 10 children.  He is , lives independently and has 3 children of his own.  He used to be a .        REVIEW OF SYSTEMS:  A 10-point review of systems is unchanged from Dr. Ovalle's initial H&P 05/31/2017 at 7:19 p.m.  Most recent vital signs:  Temperature 97.4, blood pressure 133/74, respiratory rate 20, pulse 82, oxygen saturation 92%.      MENTAL STATUS EXAMINATION:  Appearance:  The patient is a frail man lying in bed.  He is disheveled and has poor dentition.  He is a fair historian.  Speech is of normal rate, flow and tone, use of language appropriate.  Motor exam is calm.  Coordination, station, gait not tested.  Muscle strength and tone slightly diminished.  Affect is pleasant.  Mood \"okay.\"  Thought process is logical, coherent and goal directed.  No loosening of associations, no flight of ideas.  No formal thought disorder.  Thought content notable for some visual hallucinations including seeing birds and seeing \"fog.\"  He denies thoughts of wanting to hurt self or others.  Insight and judgment fair.  Cognitive exam, the patient is alert, oriented x2/3.  Concentration impaired.  Recent memory poor, remote memory grossly intact.  General fund of knowledge average.      IMPRESSION:  The patient is a 77-year-old man presenting with a delirium which is likely multifactorial.  Wellbutrin could be an aggravating factor, though he has been on it long-term.  I would suggest we reduce that dose due to the fact that it is a dopamine agonist and could worsen hallucinations and disrupt sleep.  Adding a small dose of quetiapine is reasonable to help sleep and use on a p.r.n. basis for hallucinations.  Simplification of his medications is also appropriate including limiting heavy doses of opiate analgesics which may be " contributing to this situation.      DIAGNOSES:   1.  Delirium, multifactorial.   2.  Recurrent urinary tract infection by history.   3.  Chronic hip pain.      PLAN:   1.  Recommend we utilized Seroquel 25 mg each day at bedtime, 12.5 mg q.6 h. p.r.n. for hallucinations.   2.  Change Wellbutrin SR to Wellbutrin  mg daily.  This will limit the impact on hallucinations and the potential this drug could disrupt sleep and aggravate some of the psychosis.   3.  We will follow as needed.         CAMPOS COHEN MD             D: 2017 08:10   T: 2017 08:48   MT: [email or web address]      Name:     EMILIA ALMARAZ   MRN:      [id number]        Account:       [id number]   :      1940           Consult Date:  2017      Document: [id number]    [PR1.1]   Revision History        User Key Date/Time User Provider Type Action    > PR1.1 6/3/2017  6:31 AM Campos Cohen MD Physician Sign            Consults by Shereen Hernandez MD at 2017  1:06 PM     Author:  Shereen Hernandez MD Service:  Psychiatry Author Type:  Physician    Filed:  2017  1:06 PM Date of Service:  2017  1:06 PM Creation Time:  2017 12:56 PM    Status:  Signed :  Shereen Hernandez MD (Physician)         [facility] Psychiatric Consult Progress Note      Interval History:   Pt seen, care reviewed with treatment team. He was evaluated in the presence of his daughter with his permission. She reports that he has been taking 2 Vicodin tabs every night for the last 10 years on account of his hip pain. She reports that it was initially though that his antibiotics were making him loopy and so he was taken off them before he was admitted. She says she cannot imagine her father not taking Vicodin before bed. The deleterious effects of long term narcotic use were highlighted. Patient admits he was experiencing visual hallucinations last night. He reports he just was trying to  communicate that he needed his medication for him to sleep. He apologized for his behavior as he reports aware of his belligerence. He is lucid at this time and hoping for discharge tomorrow.   Review of systems:   The Review of Systems is negative other than noted in the HPI     Medications:[OA1.1]       QUEtiapine  25 mg Oral At Bedtime     buPROPion  150 mg Oral Daily     lidocaine  1 patch Transdermal Q24h    And     lidocaine   Transdermal Q24H    And     lidocaine   Transdermal Q8H     melatonin  3 mg Oral At Bedtime     sodium chloride (PF)  3 mL Intracatheter Q8H     allopurinol (ZYLOPRIM) tablet 100 mg  100 mg Oral Daily     atenolol (TENORMIN) tablet 50 mg  50 mg Oral Daily     fluticasone-salmeterol  1 puff Inhalation Q12H     furosemide (LASIX) tablet 20 mg  20 mg Oral Daily     gabapentin (NEURONTIN) capsule 300 mg  300 mg Oral At Bedtime     tamsulosin (FLOMAX) capsule 0.4 mg  0.4 mg Oral At Bedtime     acetaminophen  650 mg Oral TID     QUEtiapine, sodium chloride (PF), albuterol, lidocaine, lidocaine 4%, sodium chloride (PF), acetaminophen, acetaminophen, naloxone, senna-docusate, polyethylene glycol, bisacodyl, ondansetron **OR** ondansetron, HYDROcodone-acetaminophen[OA1.2]      Mental Status Examination:     Appearance:  awake, alert and appeared as age stated  Eye Contact:  fair  Speech:  clear, coherent  Psychomotor Behavior:  no evidence of tardive dyskinesia, dystonia, or tics  Mood:  better  Affect:  mood congruent  Thought Process:  logical and linear no loose associations  Thought Content:  no evidence of suicidal ideation or homicidal ideation and no evidence of psychotic thought  Oriented to:  person and place  Attention Span and Concentration:  limited  Recent and Remote Memory:  limited  Fund of Knowledge: low-normal  Muscle Strength and Tone: normal  Gait and Station: NA  Insight:  limited  Judgment:  limited        Labs/vitals:[OA1.1]   No results found for this or any previous visit  (from the past 24 hour(s)).[OA1.2]  B/P: 130/67, T: 97.4, P: 79, R: 18    Impression:   The patient is a 77-year-old man presenting with a delirium which is likely multifactorial. Code 21 was called last night on account of his agitation and psychosis.      DIagnoses:     1.  Delirium, multifactorial.   2.  Recurrent urinary tract infection by history.   3.  Chronic hip pain.          Plan:   1. Written information given on medications. Side effects, risks, benefits reviewed.  2. Continue current medications as ordered as he is lucid and back on his pain medications.      Attestation:  Patient has been seen and evaluated by me,[OA1.1]  Shereen Hernandez MD[OA1.2]       Revision History        User Key Date/Time User Provider Type Action    > OA1.2 6/2/2017  1:06 PM Shereen Hernandez MD Physician Sign     OA1.1 6/2/2017 12:56 PM Shereen Hernandez MD Physician             Consults by Shereen Hernandez MD at 6/2/2017 12:56 PM     Author:  Shereen Hernandez MD Service:  Psychiatry Author Type:  Physician    Filed:  6/2/2017 12:56 PM Date of Service:  6/2/2017 12:56 PM Creation Time:  6/2/2017 12:56 PM    Status:  Signed :  Shereen Hernandez MD (Physician)     Consult Orders:    1. Psychiatry IP Consult: f/u from 6/1, continued agitation/hallucinations; Consultant may enter orders: Yes; Patient to be seen: Routine; Call back #: [phone] [[id number]] ordered by Ciera Mccauley MD at 06/02/17 0742                Patient seen for follow-up psychiatric consultation. Please see my note for details. Thanks.[OA1.1]         Revision History        User Key Date/Time User Provider Type Action    > OA1.1 6/2/2017 12:56 PM Shereen Hernandez MD Physician Sign            Consults by Deion Ospina MD at 6/1/2017  1:54 PM     Author:  Deion Ospina MD Service:  (none) Author Type:  Physician    Filed:  6/1/2017  1:54 PM Date of Service:  6/1/2017   1:54 PM Creation Time:  6/1/2017  1:41 PM    Status:  Signed :  Deion Ospina MD (Physician)         [facility]    Orthopedic Surgery Consultation    Date of Admission:  5/31/2017  Date of Consult (When I saw the patient):[DH1.1] 06/01/17[DH1.2]    Assessment & Plan[DH1.3]     Assessment:  Severe osteoarthritis of left hip.    Plan:  He is planning on getting his follow-up care at [facility].  He was admitted here because of hallucinations which appears to be unrelated to either lack of sleep or new medications.  I talked with his daughter and Mackenzie..  He has been followed by a doctor there who is working on getting him medically cleared for a left total hip replacement.  The surgeons goal was to get his wounds cleared up off of his legs and make sure that his urinary tract infections were cleared.  His daughter is hoping to have him transferred to some type of assisted care facility where he is less likely to fall and can eventually get his hip replaced.  She is working with the  at this time.  I talked with her about getting a intra-articular hip injection on the left.  His last one was in August of last year.  He has been trying to avoid the injections because it would delay his hip replacement.    At this time we really didn't have anything more to offer.  Hospitalist will work with him on pain management for discharge and I will sign off at this time.[DH1.1]          Deion Ospina[DH1.3],MD[DH1.1]    Code Status    Full Code    Reason for Consult[DH1.3]   Reason for consult: evaluate left hip pain[DH1.1]    Primary Care Physician   Jerrod Graves    Chief Complaint[DH1.3]   Left Hip Pain[DH1.1]    History of Present Illness[DH1.3]   History is obtained from the patient and the daughter    Deion Diaz is a 77 year old male who  was admitted to the hospital because of hallucinations and some recent falls at home.  He has known severe osteoarthritis of his left hip.   He was recently changed from Vicodin to oxycodone.  He was also on Valium and Bactrim for a urinary tract infection.    I was asked to see the patient to see if there were any other options for treatment of his left hip pain other than narcotics.    He has been followed at [facility] for his left hip.  His surgeon is Dr. Garsia.  Surgery for his hip has been delayed because of urinary tract infection and some open sores on his legs.  He has a history of some congestive heart failure.  His only comfortable position is sitting.  Because of sitting he has dependent edema in both lower extremities.  This has caused some swelling and skin breakdown.    He has had hip injections last one was in August 2016 or in the fall of 2060.[DH1.1]     Past Medical History[DH1.3]   I have reviewed this patient's medical history and updated it with pertinent information if needed.[DH1.1]   Past Medical History:   Diagnosis Date     Chronic kidney disease      Closed left hip fracture (H)      Emphysema of lung (H)        Past Surgical History[DH1.3]   I have reviewed this patient's surgical history and updated it with pertinent information if needed.[DH1.1]  Past Surgical History:   Procedure Laterality Date     BACK SURGERY      neck, back      CARDIAC SURGERY      stent X1        Prior to Admission Medications   Prior to Admission Medications   Prescriptions Last Dose Informant Patient Reported? Taking?   ALLOPURINOL PO 5/31/2017 at am Daughter Yes Yes   Sig: Take 100 mg by mouth daily    ATENOLOL PO 5/31/2017 at am Daughter Yes Yes   Sig: Take 50 mg by mouth daily    ATORVASTATIN CALCIUM PO  Daughter Yes No   Sig: Take 20 mg by mouth daily    Ca Carbonate-Mag Hydroxide (ROLAIDS PO) prn Daughter Yes Yes   Sig: Take by mouth as needed   FINASTERIDE PO 5/31/2017 at am Daughter Yes Yes   Sig: Take 5 mg by mouth daily   FUROSEMIDE PO 5/31/2017 at am Daughter Yes Yes   Sig: Take 20 mg by mouth daily    GABAPENTIN PO 5/30/2017 at hs Daughter Yes  Yes   Sig: Take 300 mg by mouth At Bedtime    NITROGLYCERIN SL prn Daughter Yes Yes   Sig: Place 0.4 mg under the tongue   NONFORMULARY 5/31/2017 at am Daughter Yes Yes   Sig: Hydrocortisone 2.5% cream mixed 1:1 with Vanicream apply BID from neck down to affected area   OXYCODONE HCL PO prn Daughter Yes Yes   Sig: Take 5 mg by mouth every 6 hours as needed   TAMSULOSIN HCL PO 5/30/2017 at hs Daughter Yes Yes   Sig: Take 0.4 mg by mouth At Bedtime   albuterol (PROAIR HFA, PROVENTIL HFA, VENTOLIN HFA) 108 (90 BASE) MCG/ACT inhaler prn Daughter Yes Yes   Sig: Inhale 2 puffs into the lungs every 6 hours as needed    buPROPion (WELLBUTRIN SR) 150 MG 12 hr tablet 5/31/2017 at am Daughter Yes Yes   Sig: Take 150 mg by mouth 2 times daily   fluticasone-salmeterol (ADVAIR) 500-50 MCG/DOSE diskus inhaler 5/31/2017 at am Daughter Yes Yes   Sig: Inhale 1 puff into the lungs every 12 hours   multivitamin, therapeutic with minerals (MULTI-VITAMIN) TABS 5/31/2017 at am Daughter Yes Yes   Sig: Take 1 tablet by mouth daily      Facility-Administered Medications: None     Allergies   No Known Allergies    Social History[DH1.3]   I have reviewed this patient's social history and updated it with pertinent information if needed. Deion ADEN Emily[DH1.1]  reports that he quit smoking about 17 years ago. He has a 60.00 pack-year smoking history. He has never used smokeless tobacco. He reports that he drinks alcohol. He reports that he does not use illicit drugs.    Family History[DH1.3]   I have reviewed this patient's family history and updated it with pertinent information if needed.[DH1.1]   No family history on file.    Review of Systems[DH1.3]   The 5 point Review of Systems is negative other than noted in the HPI and under past medical and surgical history.[DH1.1]    Physical Exam   Temp: 97.4  F (36.3  C) Temp src: Oral BP: 133/74 Pulse: 82 Heart Rate: 76 Resp: 20 SpO2: 92 % O2 Device: None (Room air)[DH1.3]    Vital Signs with  Ranges[DH1.1]  Temp:  [97.4  F (36.3  C)-98  F (36.7  C)] 97.4  F (36.3  C)  Pulse:  [82] 82  Heart Rate:  [76-89] 76  Resp:  [20] 20  BP: (117-136)/(64-92) 133/74  SpO2:  [91 %-95 %] 92 %  0 lbs 0 oz[DH1.3]    Constitutional: Alert , Co-opertive, laying in bed, prefers to lay on his left side.  SkiIntact over the left hip.  He has numerous areas of small opens areas on both lower extremities most are approximately 2 cm in diameter.  No draining wounds.  Motor:Grossly intact  Sensory: Intact  Circulation: Bilateral lower extremity edema    he has very little range of motion of his left hip.  There is crepitus side and extend his hip.  He has essentially no internal or external rotation.  I cannot extend his hip into a neutral position.[DH1.1]      Data   Results for orders placed or performed during the hospital encounter of 05/31/17 (from the past 24 hour(s))   CBC with platelets differential   Result Value Ref Range    WBC 10.5 4.0 - 11.0 10e9/L    RBC Count 4.75 4.4 - 5.9 10e12/L    Hemoglobin 14.4 13.3 - 17.7 g/dL    Hematocrit 43.2 40.0 - 53.0 %    MCV 91 78 - 100 fl    MCH 30.3 26.5 - 33.0 pg    MCHC 33.3 31.5 - 36.5 g/dL    RDW 13.3 10.0 - 15.0 %    Platelet Count 327 150 - 450 10e9/L    Diff Method Automated Method     % Neutrophils 76.2 %    % Lymphocytes 11.1 %    % Monocytes 11.2 %    % Eosinophils 1.0 %    % Basophils 0.2 %    % Immature Granulocytes 0.3 %    Nucleated RBCs 0 0 /100    Absolute Neutrophil 8.0 1.6 - 8.3 10e9/L    Absolute Lymphocytes 1.2 0.8 - 5.3 10e9/L    Absolute Monocytes 1.2 0.0 - 1.3 10e9/L    Absolute Eosinophils 0.1 0.0 - 0.7 10e9/L    Absolute Basophils 0.0 0.0 - 0.2 10e9/L    Abs Immature Granulocytes 0.0 0 - 0.4 10e9/L    Absolute Nucleated RBC 0.0    Basic metabolic panel   Result Value Ref Range    Sodium 137 133 - 144 mmol/L    Potassium 4.0 3.4 - 5.3 mmol/L    Chloride 102 94 - 109 mmol/L    Carbon Dioxide 27 20 - 32 mmol/L    Anion Gap 8 3 - 14 mmol/L    Glucose 86 70 - 99  mg/dL    Urea Nitrogen 21 7 - 30 mg/dL    Creatinine 1.33 (H) 0.66 - 1.25 mg/dL    GFR Estimate 52 (L) >60 mL/min/1.7m2    GFR Estimate If Black 63 >60 mL/min/1.7m2    Calcium 9.7 8.5 - 10.1 mg/dL   *UA reflex to Microscopic   Result Value Ref Range    Color Urine Yellow     Appearance Urine Clear     Glucose Urine Negative NEG mg/dL    Bilirubin Urine Negative NEG    Ketones Urine 15 (A) NEG mg/dL    Specific Gravity Urine 1.015 1.003 - 1.035    Blood Urine Negative NEG    pH Urine 6.0 5.0 - 7.0 pH    Protein Albumin Urine Negative NEG mg/dL    Urobilinogen Urine 0.2 0.2 - 1.0 EU/dL    Nitrite Urine Negative NEG    Leukocyte Esterase Urine Trace (A) NEG    Source Midstream Urine    Urine Microscopic   Result Value Ref Range    WBC Urine 2-5 (A) 0 - 2 /HPF    RBC Urine O - 2 0 - 2 /HPF    Bacteria Urine Few (A) NEG /HPF   Head CT w/o contrast    Narrative    CT HEAD W/O CONTRAST   5/31/2017 5:18 PM     HISTORY: confusion    TECHNIQUE:  Axial images of the head without IV contrast material.  Radiation dose for this scan was reduced using automated exposure  control, adjustment of the mA and/or kV according to patient size, or  iterative reconstruction technique.    COMPARISON: None.    FINDINGS: The ventricles are normal in size, shape and configuration.  The brain parenchyma and subarachnoid spaces are normal. There is no  evidence of intracranial hemorrhage, mass, acute infarct or anomaly.  The visualized portions of the sinuses and mastoids appear normal.  There is no evidence of trauma.      Impression    IMPRESSION:  No acute pathology, no bleed, mass, or acute infarcts are  seen.    JOSIAH PARTIDA MD   Care Coordinator IP Consult    Narrative    Martha Negrete RN     6/1/2017  1:04 PM  Care Transition Initial Assessment - RN    Met with: Patient and his Daughter Mackenzie SANTIAGO   Active Problems:    Failure to thrive in adult       Cognitive Status: awake, alert and oriented.        Contact information and PCP  "information verified: Yes    Lives With: alone  Living Arrangements: condominium    Insurance concerns: Pt is Observation status, will not have   coverage for TCU    ASSESSMENT  Patient currently receives the following services:  Pt has a   person he hires to help with cleaning, cooking and errands     Identified issues/concerns regarding health management: Pt has   osteoarthritis with pretty severe left hip pain to the point he   no longer is able to walk.  He has a motorized scooter.  He tends   to sit on the edge of his bed in an attempt to relieve his pain   and then falls asleep in the sitting position and then often   slips off the side of the bed onto floor.  His hallucinations have resolved.  Dr Ospina has seen pt and   imaging is pending.  Discussed rehab with pt.  He is aware that this will be private   pay.  He shared that he knows he needs to move into an assisted   living and has been looking at facilities that would accept MA   once his \"money runs out\".  He is not totally against the idea of   transitioning to a TCU.  He will think more about this.  His son   and daughter live close to him.  There is no family that is able   to stay with him.  SW will see him  PLAN  Financial costs for the patient include Observation status  Patient given options and choices for discharge: PT is   recommending TCU.  Pt understands that TCU will be private pay.    Pt's Daughter Mackenzie would like referral into [facility].    She lives four blocks from this facility.   Patient/family is agreeable to the plan? Mackenzie felt that pt   transitioning home is not an option due to his falling.  She will   have a discussion with pt concerning need for TCU with transition   to an [facility]    Discharge Planner   Discharge Plans in progress:SW will place referral to [facility]  Barriers to discharge plan: Private pay, pain control  Follow up plan: Anticipate discharge to a TCU tomorrow pending   imaging and assessment    Care "  (CTS) will continue to follow as   needed.     Orthopedics IP Consult: Patient to be seen: Routine - within 24 hours; Left hip pain that driving his narcotic pain medication and inability to raise legs while in bed.  ? Any non-narcotic options here ?; Consultant may enter orders: Yes    Deion Monique MD     6/1/2017  5:44 AM  Orthopedic Consult received   Chart reviewed  Xrays of pelvis and hips ordered    Formal consult to follow  Deion Ospina MD   Psychiatry IP Consult: Hallucintations; Consultant may enter orders: Yes; Patient to be seen: Routine; Call back #: Cover PA for the obs unit    Campos Meyer MD     6/1/2017  8:03 AM  Pt seen for new psychiatric consultation, see my dictation.    Campos Sage MD    CBC with platelets   Result Value Ref Range    WBC 10.8 4.0 - 11.0 10e9/L    RBC Count 4.28 (L) 4.4 - 5.9 10e12/L    Hemoglobin 13.0 (L) 13.3 - 17.7 g/dL    Hematocrit 38.6 (L) 40.0 - 53.0 %    MCV 90 78 - 100 fl    MCH 30.4 26.5 - 33.0 pg    MCHC 33.7 31.5 - 36.5 g/dL    RDW 13.3 10.0 - 15.0 %    Platelet Count 287 150 - 450 10e9/L   Basic metabolic panel   Result Value Ref Range    Sodium 139 133 - 144 mmol/L    Potassium 3.9 3.4 - 5.3 mmol/L    Chloride 104 94 - 109 mmol/L    Carbon Dioxide 25 20 - 32 mmol/L    Anion Gap 10 3 - 14 mmol/L    Glucose 93 70 - 99 mg/dL    Urea Nitrogen 21 7 - 30 mg/dL    Creatinine 1.32 (H) 0.66 - 1.25 mg/dL    GFR Estimate 52 (L) >60 mL/min/1.7m2    GFR Estimate If Black 63 >60 mL/min/1.7m2    Calcium 9.2 8.5 - 10.1 mg/dL   XR Pelvis and Hip Bilateral 2 Views    Narrative    XR PELVIS AND HIP BILATERAL 2 VIEWS 6/1/2017 10:25 AM    COMPARISON: None.    HISTORY: Left hip pain.      Impression    IMPRESSION: Severe left hip osteoarthritis with complete loss of joint  space and significant femoral head remodeling. No definite fractures  are seen. Mild degenerative changes in the right hip. Partially  imaged  lumbar spinal fusion hardware.    KARRIE SALAZAR[DH1.3]       Imaging:Severe osteoarthritis left hip rests focal areas of osteonecrosis in the femoral head I did not see any signs of previous fracture.[DH1.1]                   Revision History        User Key Date/Time User Provider Type Action    > DH1.2 6/1/2017  1:54 PM Deion Ospina MD Physician Sign     DH1.3 6/1/2017  1:42 PM Deion Ospina MD Physician      DH1.1 6/1/2017  1:41 PM Deion Ospina MD Physician             Consults by Martha Negrete RN at 6/1/2017 10:44 AM     Author:  Martha Negrete RN Service:  Care Coordinator Author Type:      Filed:  6/1/2017  1:04 PM Date of Service:  6/1/2017 10:44 AM Creation Time:  6/1/2017 10:40 AM    Status:  Signed :  Martha Negrete RN ()     Consult Orders:    1. Care Coordinator IP Consult [[id number]] ordered by Jay Ovalle MD at 05/31/17 1907                Care Transition Initial Assessment - RN    Met with: Patient[HN1.1] and his Daughter Mackenzie[HN1.2]    DATA[HN1.1]   Active Problems:    Failure to thrive in adult[HN1.3]       Cognitive Status: awake, alert and oriented.        Contact information and PCP information verified: Yes    Lives With: alone  Living Arrangements: condominium    Insurance concerns:[HN1.1] Pt[HN1.4] is Observation status, will not have coverage for TCU    ASSESSMENT  Patient currently receives the following services:  Pt has a person he hires to help with cleaning, cooking and errands     Identified issues/concerns regarding health management: Pt has[HN1.1] osteoarthritis with pretty severe left hip pain to the point he no longer is able to walk.  He has a motorized scooter.  He tends to sit on the edge of his bed in an attempt to relieve his pain and then falls asleep in the sitting position and then often slips off the side of the bed onto floor.  His hallucinations have resolved.  Dr Ospina has seen pt and imaging is  "pending.  Discussed rehab with pt.  He is aware that this will be private pay.  He shared that he knows he needs to move into an assisted living and has been looking at facilities that would accept MA once his \"money runs out\".  He is not totally against the idea of transitioning to a TCU.  He will think more about this.  His son and daughter live close to him.  There is no family that is able to stay with him.  SW will see him[HN1.4]  PLAN  Financial costs for the patient include[HN1.1] Observation status[HN1.4]  Patient given options and choices for discharge[HN1.1]: PT is recommending TCU.  Pt understands that TCU will be private pay[HN1.4].  Pt's Daughter Mackenzie would like referral into [facility].  She lives four blocks from this facility.[HN1.2]   Patient/family is agreeable to the plan?[HN1.1] Mackenzie felt that pt transitioning home is not an option due to his falling.  She will have a discussion with pt concerning need for TCU with transition to an [facility][HN1.2]    Discharge Planner[HN1.3]   Discharge Plans in progress:[HN1.1]SW will place referral to [facility][HN1.2]  Barriers to discharge plan:[HN1.1] Private pay, pain control[HN1.2]  Follow up plan:[HN1.1] Anticipate discharge to a TCU tomorrow pending imaging and assessment[HN1.2]    Care  (CTS) will continue to follow as needed.[HN1.1]       Revision History        User Key Date/Time User Provider Type Action    > HN1.2 6/1/2017  1:04 PM Martha Negrete, RN Case Manager Sign     HN1.4 6/1/2017 10:47 AM Martha Negrete RN Case Manager      HN1.3 6/1/2017 10:41 AM Martha Negrete RN Case Manager      HN1.1 6/1/2017 10:40 AM Martha Negrete, RN Case Manager             Consults by Campos Sage MD at 6/1/2017  8:03 AM     Author:  Campos Sage MD Service:  Psychiatry Author Type:  Physician    Filed:  6/1/2017  8:03 AM Date of Service:  6/1/2017  8:03 AM Creation Time:  6/1/2017  8:03 AM    Status:  " Signed :  Campos Sage MD (Physician)     Consult Orders:    1. Psychiatry IP Consult: Hallucintations; Consultant may enter orders: Yes; Patient to be seen: Routine; Call back #: Cover PA for the obs unit [[id number]] ordered by Jay Ovalle MD at 05/31/17 1906                Pt seen for new psychiatric consultation, see my dictation.    Campos Sage MD[PR1.1]      Revision History        User Key Date/Time User Provider Type Action    > PR1.1 6/1/2017  8:03 AM Campos Sage MD Physician Sign            Consults by Deion Ospina MD at 6/1/2017  5:44 AM     Author:  Deion Ospina MD Service:  (none) Author Type:  Physician    Filed:  6/1/2017  5:44 AM Date of Service:  6/1/2017  5:44 AM Creation Time:  6/1/2017  5:42 AM    Status:  Signed :  Deion Ospina MD (Physician)     Consult Orders:    1. Orthopedics IP Consult: Patient to be seen: Routine - within 24 hours; Left hip pain that driving his narcotic pain medication and inability to raise legs while in bed.  ? Any non-narcotic options here ?; Consultant may enter orders: Yes [[id number]] ordered by Jay Ovalle MD at 05/31/17 1906                Orthopedic Consult received   Chart reviewed  Xrays of pelvis and hips ordered    Formal consult to follow  Deion Ospina MD[DH1.1]     Revision History        User Key Date/Time User Provider Type Action    > DH1.1 6/1/2017  5:44 AM Deion Ospina MD Physician Sign                     Progress Notes - Physician (Notes from 05/31/17 through 06/03/17)      Progress Notes by Jeanette Gentile LICSW at 6/3/2017 12:18 PM     Author:  Jeanette Gentile LICSW Service:  Social Work Author Type:      Filed:  6/3/2017 12:18 PM Date of Service:  6/3/2017 12:18 PM Creation Time:  6/3/2017 12:18 PM    Status:  Signed :  Jeanette Gentile LICSW ()         SW:  D:  Faxed the discharge orders to [facility].  No further needs  anticipated.[SJ1.1]     Revision History        User Key Date/Time User Provider Type Action    > SJ1.1 6/3/2017 12:18 PM Jeanette Gentile, [facility]  Sign            Progress Notes by Melissa Gibbons LSW at 6/3/2017 10:22 AM     Author:  Melissa Gibbons LSW Service:  Social Work Author Type:      Filed:  6/3/2017 11:46 AM Date of Service:  6/3/2017 10:22 AM Creation Time:  6/3/2017 10:22 AM    Status:  Addendum :  Melissa Gibbons LSW ()         Sw:    I/P:  Sunny confirmed with Pete Jules that pt can d/c today but did request to speak with RN prior to d/c.  Sw provided nursing number.  Per SW ramo pt/daughter is in agreement to TCU and is aware of the private pay costs.  Per rounds pt may d/c today.  Sunny contacted daughter and updated her on potential d/c today.  Daughter stated that pt needs medical transport but not stretcher due to not being able to lay down.  Daughter stated that pt is now stabilized but if needed, she will ride with pt.  Sw explained that w/c transport via [facility] is private pay.  Daughter asked that Sw connect with pt to ask him what transportation he prefers:  [facility] or [employer].  CC spoke with pt and he stated that he would prefer [facility].  Sunny contacted [facility] and rep confirmed that they cannot provide transport today unless pt was to ride airport taxi which would not meet his medical needs.  Sunny updated daughter and daughter agreed with [facility] transport and will update pt.[KE1.1]      Update:  SUNNY arranged w/c transport with Christ[facility] from  for 1630[KE1.2].  Sunny updated RN, daughter, and [facility].  Sw to fax orders/scripts/PAS once complete.[KE1.3]     Revision History        User Key Date/Time User Provider Type Action    > [N/A] 6/3/2017 11:46 AM Melissa Gibbons LSW  Addend     KE1.3 6/3/2017 10:43 AM Melissa Gbibons LSW  Addend     KE1.2 6/3/2017 10:35 AM Melissa Gibbons LSW  Addend      KE1.1 6/3/2017 10:30 AM Melissa Gibbons LSW  Sign            Progress Notes by Mervat Jesus, RN at 6/3/2017 11:18 AM     Author:  Mervat Jesus RN Service:  General Medicine Author Type:  Registered Nurse    Filed:  6/3/2017 11:19 AM Date of Service:  6/3/2017 11:18 AM Creation Time:  6/3/2017 11:18 AM    Status:  Signed :  Mervat Jesus, LAUREN (Registered Nurse)         Updated Tess @ [facility] regarding pt. Status.[LO1.1]      Revision History        User Key Date/Time User Provider Type Action    > LO1.1 6/3/2017 11:19 AM Mervat Jesus RN Registered Nurse Sign            Progress Notes by Saranya Mendez RT at 6/3/2017  3:33 AM     Author:  Saranya Mendez RT Service:  (none) Author Type:  Respiratory Therapist    Filed:  6/3/2017  3:37 AM Date of Service:  6/3/2017  3:33 AM Creation Time:  6/3/2017  3:33 AM    Status:  Signed :  Saranya Mendez RT (Respiratory Therapist)         Pt on CPAP +10  21% throughout the night, gel pad and mepilex placed on under the mask. Pt tolerated well. Will continue to follow.[HA1.1]  6/3/2017  Saranya Mendez[HA1.2]       Revision History        User Key Date/Time User Provider Type Action    > HA1.2 6/3/2017  3:37 AM Saranya Mendez RT Respiratory Therapist Sign     HA1.1 6/3/2017  3:33 AM Saranya Mendez RT Respiratory Therapist             Progress Notes by Ciera Mccauley MD at 6/2/2017  3:23 PM     Author:  Ciera Mccauley MD Service:  Hospitalist Author Type:  Physician    Filed:  6/2/2017  3:36 PM Date of Service:  6/2/2017  3:23 PM Creation Time:  6/2/2017  3:23 PM    Status:  Signed :  Ciera Mccauley MD (Physician)         [facility]    Hospitalist Progress Note    Date of Service (when I saw the patient):[SN1.1] 06/02/2017    Assessment & Plan[SN1.2]   The patient is a 77-year-old male with a complicated medical history including chronic obstructive pulmonary disease, chronic kidney disease, coronary artery  disease  status post PCI, severe left hip osteoarthritis, hypertension, BPH, obstructive sleep apnea, chronic lower extremity edema who presents to the Emergency Department with hallucinations.      1. Hallucinations likely secondary to narcotic use and sleep deprived.  Concurrent initiation of Bactrim and upgrading from Vicodin to oxycodone.  Bactrim already discontinued.  CT head without acute findings. Acutely agitated and combative 6/2 morning, code 21 called and required IM haldol with sitter.  - Minimize delirium causing medications as much as possible  - Psych consult appreciated, c/w seroquel and wellbutrin XL  - check EKG in the event more antipsychotics meds are needed     2.  Worsening left hip pain:  No recent trauma.  Clearly this is the driving force behind his narcotic use as well as with difficulty even raising his legs for treating his lower extremity edema. No more oxycodone. Orthopedics consult appreciated, XR hip without acute fractures  - c/w scheduled Tylenol.    - cautious use of vicodin as he tolerated this in the past  - lidoderm patch at night  - PTA gabapentin at night     3.  Coronary artery disease:  No chest pain.    - PTA atenolol.  - hold prior to admit atorvastatin given observation status.      4.  Lower extremity edema with superficial wounds:    - Appreciate Wound Care consult  - elevate legs as much as possible.   - PTA Lasix and give additional dose this evening  - BMP in AM     5.  BPH:  PTA finasteride and tamsulosin.      6.  Chronic obstructive pulmonary disease:  Breathing at baseline.    - PTA Advair and p.r.n. albuterol.     DVT Prophylaxis: Pneumatic Compression Devices  Code Status:[SN1.1] Full Code[SN1.2]    Disposition: Expected discharge in 1 day to TCU. Spoke with daughter and pt at bedside extensively regarding their frustration with health system and feeling abandoned. Was able to convince patient TCU is in his best interests as he wants to proceed with eventual  hip replacement that is being managed in clinic. He understands it will be a long path of recovery but feels this is the best option as his pain management is proving quite difficult, and is the reason he is in the hospital facing side effects of pain control. Plan is for discharge 6/3.[SN1.1]    Ciera Mccauley[SN1.2], MD[SN1.1]    Interval History[SN1.2]   Combative and acutely agitated this morning, code 21 called, given haldol IM per house doctor. Feels more calm now, states he did not get vicodin last night thus he couldn't sleep this morning.     -Data reviewed today: I reviewed all new labs and imaging results over the last 24 hours. I personally reviewed no images or EKG's today.[SN1.1]    Physical Exam   Temp: 97.4  F (36.3  C) Temp src: Oral BP: 130/67 Pulse: 79 Heart Rate: 72 Resp: 20 SpO2: 93 % O2 Device: None (Room air)    There were no vitals filed for this visit.[SN1.2]  Vital Signs with Ranges[SN1.1]  Temp:  [97.4  F (36.3  C)-97.9  F (36.6  C)] 97.4  F (36.3  C)  Pulse:  [79] 79  Heart Rate:  [72] 72  Resp:  [18-20] 20  BP: (120-130)/(67-69) 130/67  SpO2:  [92 %-93 %] 93 %  I/O last 3 completed shifts:  In: 270 [P.O.:270]  Out: 625 [Urine:625][SN1.2]    Constitutional: Awake, alert, cooperative, no apparent distress, sitting up in chair with daughter in room  Respiratory: Clear to auscultation bilaterally, no crackles or wheezing  Cardiovascular: Regular rate and rhythm, normal S1 and S2, and no murmur noted  GI: Normal bowel sounds, soft, non-distended, non-tender, obese abd  Skin/Integumen: No rashes, no cyanosis, pitting edema in pretibial areas b/l 1+, pretibial wound draining clear fluid[SN1.1]    Medications[SN1.2]        furosemide  40 mg Oral Once     QUEtiapine  25 mg Oral At Bedtime     buPROPion  150 mg Oral Daily     lidocaine  1 patch Transdermal Q24h    And     lidocaine   Transdermal Q24H    And     lidocaine   Transdermal Q8H     melatonin  3 mg Oral At Bedtime     sodium chloride  (PF)  3 mL Intracatheter Q8H     allopurinol (ZYLOPRIM) tablet 100 mg  100 mg Oral Daily     atenolol (TENORMIN) tablet 50 mg  50 mg Oral Daily     fluticasone-salmeterol  1 puff Inhalation Q12H     furosemide (LASIX) tablet 20 mg  20 mg Oral Daily     gabapentin (NEURONTIN) capsule 300 mg  300 mg Oral At Bedtime     tamsulosin (FLOMAX) capsule 0.4 mg  0.4 mg Oral At Bedtime     acetaminophen  650 mg Oral TID[SN1.3]       Data[SN1.2]     Recent Labs  Lab 06/01/17  0744 05/31/17  2154 05/31/17  1620 05/30/17  1953   WBC  --  10.8 10.5  --    HGB  --  13.0* 14.4  --    MCV  --  90 91  --    PLT  --  287 327  --      --  137 137   POTASSIUM 3.9  --  4.0 3.9   CHLORIDE 104  --  102 102   CO2 25  --  27 25   BUN 21  --  21 21   CR 1.32*  --  1.33* 1.22   ANIONGAP 10  --  8 10   LOU 9.2  --  9.7 9.1   GLC 93  --  86 90[SN1.3]       No results found for this or any previous visit (from the past 24 hour(s)).[SN1.2]       Revision History        User Key Date/Time User Provider Type Action    > SN1.3 6/2/2017  3:36 PM Ciera Mccauley MD Physician Sign     SN1.2 6/2/2017  3:26 PM Ciera Mccauley MD Physician      SN1.1 6/2/2017  3:23 PM Ciera Mccauley MD Physician             Progress Notes by Melissa Higuera LICSW at 6/2/2017  2:17 PM     Author:  Melissa Higuera LICSW Service:  (none) Author Type:      Filed:  6/2/2017  2:27 PM Date of Service:  6/2/2017  2:17 PM Creation Time:  6/2/2017  2:17 PM    Status:  Addendum :  Melissa Higuera LICSW ()         SUNNY  D[facility]  [facility] and that we are anticipating d/c on Saturday if patient remains stable over night.  [facility] will have a bed on their standard TCU and their cognitive impaired TCU.  Their admission staff will review EPIC notes tomorrow and determine which unit they prefer to admit patient to.  Patient requested to see  this afternoon. When writer entered the room,  he was sleeping and daughter requested he be allowed to sleep.[KH1.1]  P:[KH1.2]  Patient's d/c plan still needs to be finalized.  As of yesterday afternoon, patient was not wanting to go to a TCU and use his finances.[KH1.1]  He preferred to go home and hire help.[KH1.2]       Revision History        User Key Date/Time User Provider Type Action    > [N/A] 6/2/2017  2:27 PM Melissa Higuera, [facility]  Addend     KH1.2 6/2/2017  2:24 PM Melissa Higuera, [facility]  Sign     KH1.1 6/2/2017  2:17 PM Melissa Higuera, [facility]              Progress Notes by Mae Madrigal RN at 6/2/2017  8:17 AM     Author:  Mae Madrigal RN Service:  (none) Author Type:  Registered Nurse    Filed:  6/2/2017  8:18 AM Date of Service:  6/2/2017  8:17 AM Creation Time:  6/2/2017  8:17 AM    Status:  Signed :  Mae Madrigal RN (Registered Nurse)         Restraints removed at 730 am.  Patient calm and cooperative.  Up in chair with 2 assistance.  A&Ox3-4, slightly disoriented to situation.  Denies hallucinations at this time.  Vitals stable.  Pain 4/10 scheduled tylenol given.  Continue to monitor.[TE1.1]       Revision History        User Key Date/Time User Provider Type Action    > TE1.1 6/2/2017  8:18 AM Mae Madrigal RN Registered Nurse Sign            Progress Notes by Sheba Aguilar RN at 6/2/2017  4:30 AM     Author:  Sheba Aguilar RN Service:  (none) Author Type:  Registered Nurse    Filed:  6/2/2017  5:01 AM Date of Service:  6/2/2017  4:30 AM Creation Time:  6/2/2017  4:59 AM    Status:  Signed :  Sheba Aguilar RN (Registered Nurse)         Responded to chair alarm in patient's room. Pt noted to be disoriented and agitated. Pt attempting to get up by himself. Attempted to assist patient to bed, patient attempting to hit at staff. Security called, pt threw room phone at wall. Code 21 called.[NB1.1]     Revision History        User Key Date/Time User Provider Type  "Action    > NB1.1 6/2/2017  5:01 AM Sheba Aguilar, RN Registered Nurse Sign            Progress Notes by Aria Serrano MD at 6/2/2017  4:53 AM     Author:  Aria Serrano MD Service:  Internal Medicine Author Type:  Resident    Filed:  6/2/2017  4:56 AM Date of Service:  6/2/2017  4:53 AM Creation Time:  6/2/2017  4:53 AM    Status:  Signed :  Aria Serrano MD (Resident)         [place] MD NOTE  RE:  CODE 21    76yo male admitted with delirium. Code 21 called ar 445. Per RN staff patient with escalating verbal hallucinations and throwing phones at the wall. Refusing oral seroquel. Pulled out IV. Insensical speech upon evaluation. Patient sitting in chair. No ECG on file. Successfully transferred to hospital bed. Given haldol IM x1. Soft restraints placed.        Aria Serrano MD  House Physician  Time Spent: 15 mins[AB1.1]     Revision History        User Key Date/Time User Provider Type Action    > AB1.1 6/2/2017  4:56 AM Aria Serrano MD Resident Sign            ED Provider Notes signed by Trierweiler, Chad A, MD at 6/1/2017  5:45 PM      Author:  Trierweiler, Chad A, MD Service:  Emergency Medicine Author Type:  Physician    Filed:  6/1/2017  5:45 PM Date of Service:  5/31/2017  6:01 PM Creation Time:  5/31/2017 11:26 PM    Status:  Signed :  Trierweiler, Chad A, MD (Physician)         CHIEF COMPLAINT:  \"I'm still having hallucinations.\"      HISTORY OF PRESENT ILLNESS:  Mr. Deion Diaz is a very pleasant 77-year-old man presenting to the ER with concerns of having hallucinations.  I invite the reader to review his visit by my colleague, Dr. Garza, yesterday in this Emergency Department for the exact same issues.  He was started on Bactrim for what was thought to be a urinary tract infection approximately 5 days ago.  He was also started on oxycodone for some chronic left hip pain that he has been dealing with for years.  Since then, he has been having episodes of " "hallucinations where he sees people that are physically there, but they will disappear when he touches them.  After his visit here yesterday, his son urged him to be discharged home.  The son stayed with him last night.  The patient notes that there was \"a party at my house\" where there were people dancing on his veranda and in his bedroom, though his son continued to reassure him that they were not actually there and the patient felt that he could \"will them to go away\".  However, the patient was left alone today while his son was at work and he began to see people there.  He notes that at one point he slipped off of the edge of his bed onto the floor and he called 911 to help get him up.  He denies harming himself during this fall, though did speak to the paramedics about the fact that he thought that there were some children in the house.  They checked the house and did not find any one there.  After the paramedics left, his brother came over and again, the patient felt very confident that a grandmother had snuck into his house and that the grandmother's 2 children were running about the house and playing.  His brother checked all of the closet to look for anyone and found no one there, telling him that he was simply having hallucinations.  Finally, the patient called 911 again to have the police come and accost these 2 little boys that were playing behind the television set.  With this, they placed him on a health officer hold and brought him to the ER.      On my assessment here, Mr. Diaz has no complaints.  He denies any new pain.  He denies fever.  He denies headache.  He seems to vacillate between realizing that these are hallucinations while at the next moment expressing his frustration that children were in his house playing with electrical cords and that they could harm themselves.      PAST MEDICAL HISTORY:  Pertinent for his chronic left hip pain along with emphysema, chronic kidney disease.    "   MEDICATIONS:  He takes Advair, albuterol, atenolol, allopurinol, atorvastatin, bupropion, gabapentin, aspirin, furosemide, Flomax.      ALLERGIES:  None.      SOCIAL HISTORY:  He is a former smoker.  He does not drink to excess.      REVIEW OF SYSTEMS:  Pertinent positives and negatives as above.  All other systems are reviewed as negative.      PHYSICAL EXAMINATION:   VITAL SIGNS:  Blood pressure 133/92, heart rate of 83, respiratory rate of 20, temperature 98, satting 95% on room air.   GENERAL:  Mr. Diaz is an elderly gentleman resting comfortably as he sits on the edge of the bed.   EYES:  Pupils are equal, round, reactive to light with extraocular movements intact.   ENT:  The patient has no rhinorrhea and he has moist mucous membranes.   CARDIOVASCULAR:  Regular rate and rhythm without murmurs, gallops or rubs.   RESPIRATORY:  Clear to auscultation bilaterally without wheezes, rales or rhonchi.  There are a few scattered wheezes with decreased breath sounds throughout, but he shows no increased work of breathing.   GASTROINTESTINAL:  Positive bowel sounds, soft, nontender, nondistended.   MUSCULOSKELETAL:  He ranges all extremities without any difficulty.  The left hip is certainly sore, but he is able to bear weight.   SKIN:  Warm and dry.  His lower extremities have multiple areas of contusions and open wounds where he has struck his legs against the wall or against the floor.  However, none of these appear to be infected.   NEUROLOGIC:  Cranial nerves II-XII intact.  5/5 strength in all extremities.  Normal sensation throughout.   PSYCHIATRIC:  Normal affect.  He does admit to these hallucinations but otherwise seems to be very clear and appropriate with today's date.      LABORATORY AND DIAGNOSTICS:  The patient had an IV established and labs obtained.  This shows an unremarkable CBC.  His chemistry panel shows some mild renal insufficiency with creatinine of 1.33, but otherwise the rest of his  electrolytes are appropriate.  The patient's urinalysis shows no clear evidence of infection.      The patient underwent a head CT which shows no acute pathology, bleed, mass or acute infarcts.      EMERGENCY DEPARTMENT COURSE AND MEDICAL DECISION MAKING:  Nursing notes were reviewed and agreed with.  The patient did not require any medications while in the department.      Mr. Diaz presents to us with continued hallucinations after an evaluation yesterday.  Because of this, I do feel he will need to be admitted to the hospital.  He is clearly unable to stay by himself due to the strong believability of these hallucinations to him.  I spoke with his daughter who concurs that he needs to be admitted.  Furthermore, he would benefit from being seen by Orthopedics to see if there is anything more we can do for this left hip.  I do believe his symptoms are all likely secondary to oxycodone use.  He has been off the Bactrim a sufficient time and shows no other signs of infection.  The patient is comfortable with this as is Dr. Ovalle of the Hospitalist Service who admit him under observation status.  Family questions were answered and they are comfortable with the plan for admission as well.      DIAGNOSES:   1.  Hallucinations.   2.  Medication reaction, initial encounter -- possible.         CHAD A. TRIERWEILER, MD             D: 2017 18:01   T: 2017 23:25   MT: #[id number]      Name:     EMILIA DIAZ   MRN:      [id number]        Account:      [id number]   :      1940           Visit Date:   2017      Document: [id number]    [CT1.1]   Revision History        User Key Date/Time User Provider Type Action    > CT1.1 2017  5:45 PM Trierweiler, Chad A, MD Physician Sign            Progress Notes by Sushil Alvarez RN at 2017  3:00 PM     Author:  Sushil Alvarez RN Service:  (none) Author Type:  Registered Nurse    Filed:  2017  3:36 PM Date of Service:  2017  3:00  "PM Creation Time:  6/1/2017  3:33 PM    Status:  Signed :  Sushil Alvarez, RN (Registered Nurse)         Family member came to staff reporting that pt was seeing things. \"the computer mouse looked like a mouse\" and \"3 dogs running across the bottom of the information board\". Pt also sees fuzzy, hazy air looking out of his room toward nursing station. Pt was A&Ox4. RN asked pt to inform staff of any future events.[CG1.1]      Revision History        User Key Date/Time User Provider Type Action    > CG1.1 6/1/2017  3:36 PM Sushil Alvarez, RN Registered Nurse Sign            Progress Notes by Ciera Mccauley MD at 6/1/2017  2:21 PM     Author:  Ciera Mccauley MD Service:  Hospitalist Author Type:  Physician    Filed:  6/1/2017  2:35 PM Date of Service:  6/1/2017  2:21 PM Creation Time:  6/1/2017  2:21 PM    Status:  Signed :  Ciera Mccauley MD (Physician)         [facility]    Hospitalist Progress Note    Date of Service (when I saw the patient): 06/01/2017    Assessment & Plan   The patient is a 77-year-old male with a complicated medical history including chronic obstructive pulmonary disease, chronic kidney disease, coronary artery disease  status post PCI, severe left hip osteoarthritis, hypertension, BPH, obstructive sleep apnea, chronic lower extremity edema who presents to the Emergency Department with hallucinations.     1. Hallucinations likely secondary to narcotic use and sleep deprived.  Concurrent initiation of Bactrim and upgrading from Vicodin to oxycodone.  Bactrim already discontinued.  CT head without acute findings.   - avoid delirium causing medications as much as possible  - Psych consult appreciated, trial seroquel and changed wellbutrin to XL  - scheduled melatonin for sleep cycle regulation    2.  Worsening left hip pain:  No recent trauma.  Clearly this is the driving force behind his narcotic use as well as with difficulty even raising " his legs for treating his lower extremity edema.    - No more oxycodone.    - c/w scheduled Tylenol.    - cautious use of vicodin as he tolerated this in the past  - Orthopedics consult appreciated, XR hip without acute fractures  - trial lidoderm patch at night  - PTA gabapentin at night     3.  Coronary artery disease:  No chest pain.    - PTA atenolol.  - hold prior to admit atorvastatin given observation status.     4.  Lower extremity edema with superficial wounds:    - Appreciate Wound Care consult  - We will try to elevate legs as much as possible.   - PTA Lasix.     5.  BPH:  PTA finasteride and tamsulosin.     6.  Chronic obstructive pulmonary disease:  Breathing at baseline.    - PTA Advair and p.r.n. albuterol.     DVT Prophylaxis: Pneumatic Compression Devices  Code Status: Full Code    Disposition: Expected discharge in 1 day once hallucinations resolve and pain controlled.    Ciera Mccauley MD    Interval History   Initially seen in the morning, returned late morning when daughter and brother arrived. Patient states no new complaints. Sitting up in chair. Family feel he is much improved and appears to be hallucinating less, though patient admits to not knowing if I am real or not. Hallucinations only visual. Denies fevers/chills. No burning on urination, but admits to increase frequency. Daughter states he is in the process of being evaluated for hip surgery however he keeps falling and due to wounds/UTI's cannot go for surgery. She feels he is falling due to poor pain control, resulting in him sleeping only when sitting upright as he cannot lay flat with his hip pain.    -Data reviewed today: I reviewed all new labs and imaging results over the last 24 hours. I personally reviewed no images or EKG's today.    Physical Exam   Temp: 97.4  F (36.3  C) Temp src: Oral BP: 133/74 Pulse: 82 Heart Rate: 76 Resp: 20 SpO2: 92 % O2 Device: None (Room air)    There were no vitals filed for this visit.  Vital  Signs with Ranges  Temp:  [97.4  F (36.3  C)-98  F (36.7  C)] 97.4  F (36.3  C)  Pulse:  [82] 82  Heart Rate:  [76-89] 76  Resp:  [20] 20  BP: (117-136)/(64-92) 133/74  SpO2:  [91 %-95 %] 92 %  I/O last 3 completed shifts:  In: -   Out: 100 [Urine:100]    Constitutional: Awake, alert, cooperative, no apparent distress  Respiratory: Clear to auscultation bilaterally, no crackles or wheezing  Cardiovascular: Regular rate and rhythm, normal S1 and S2, and no murmur noted  GI: Normal bowel sounds, soft, non-distended, non-tender  Skin/Integumen: No rashes, no cyanosis, 2 areas of abrasion noted on pretibial areas b/l, no drainage. Pitting edema up to knees b/l.    Medications[SN1.1]        QUEtiapine  25 mg Oral At Bedtime     [START ON 6/2/2017] buPROPion  150 mg Oral Daily     lidocaine  1 patch Transdermal Q24h    And     [START ON 6/2/2017] lidocaine   Transdermal Q24H    And     lidocaine   Transdermal Q8H     melatonin  3 mg Oral At Bedtime     sodium chloride (PF)  3 mL Intracatheter Q8H     allopurinol (ZYLOPRIM) tablet 100 mg  100 mg Oral Daily     atenolol (TENORMIN) tablet 50 mg  50 mg Oral Daily     fluticasone-salmeterol  1 puff Inhalation Q12H     furosemide (LASIX) tablet 20 mg  20 mg Oral Daily     gabapentin (NEURONTIN) capsule 300 mg  300 mg Oral At Bedtime     tamsulosin (FLOMAX) capsule 0.4 mg  0.4 mg Oral At Bedtime     acetaminophen  650 mg Oral TID[SN1.2]       Data[SN1.1]     Recent Labs  Lab 06/01/17  0744 05/31/17  2154 05/31/17  1620 05/30/17  1953   WBC  --  10.8 10.5  --    HGB  --  13.0* 14.4  --    MCV  --  90 91  --    PLT  --  287 327  --      --  137 137   POTASSIUM 3.9  --  4.0 3.9   CHLORIDE 104  --  102 102   CO2 25  --  27 25   BUN 21  --  21 21   CR 1.32*  --  1.33* 1.22   ANIONGAP 10  --  8 10   LOU 9.2  --  9.7 9.1   GLC 93  --  86 90[SN1.2]       Recent Results (from the past 24 hour(s))   Head CT w/o contrast    Narrative    CT HEAD W/O CONTRAST   5/31/2017 5:18 PM      HISTORY: confusion    TECHNIQUE:  Axial images of the head without IV contrast material.  Radiation dose for this scan was reduced using automated exposure  control, adjustment of the mA and/or kV according to patient size, or  iterative reconstruction technique.    COMPARISON: None.    FINDINGS: The ventricles are normal in size, shape and configuration.  The brain parenchyma and subarachnoid spaces are normal. There is no  evidence of intracranial hemorrhage, mass, acute infarct or anomaly.  The visualized portions of the sinuses and mastoids appear normal.  There is no evidence of trauma.      Impression    IMPRESSION:  No acute pathology, no bleed, mass, or acute infarcts are  seen.    JOSIAH PARTIDA MD   XR Pelvis and Hip Bilateral 2 Views    Narrative    XR PELVIS AND HIP BILATERAL 2 VIEWS 6/1/2017 10:25 AM    COMPARISON: None.    HISTORY: Left hip pain.      Impression    IMPRESSION: Severe left hip osteoarthritis with complete loss of joint  space and significant femoral head remodeling. No definite fractures  are seen. Mild degenerative changes in the right hip. Partially imaged  lumbar spinal fusion hardware.    KARRIE SALAZAR[SN1.1]        Revision History        User Key Date/Time User Provider Type Action    > SN1.2 6/1/2017  2:35 PM Ciera Mccauley MD Physician Sign     SN1.1 6/1/2017  2:21 PM Ciera Mccauley MD Physician             Progress Notes by Terrance Elmore RN at 6/1/2017  6:48 AM     Author:  Terrance Elmore RN Service:  [facility] Nurse Author Type:  Registered Nurse    Filed:  6/1/2017  9:51 AM Date of Service:  6/1/2017  6:48 AM Creation Time:  6/1/2017  6:48 AM    Status:  Signed :  Terrance Elmore RN (Registered Nurse)         [facility] Nurse Inpatient Wound Assessment     Initial Assessment of wound(s) on pt's:   Bilateral lower legs        Data:   Patient History:      per MD note(s): 77-year-old male with a past medical history of CKD, COPD, hypertension,  hyperlipidemia, BPH and persistent recurrent UTIs, lower extremity edema, ulcerations who presents to the Emergency Department with hallucinations.  The patient was seen in the emergency department yesterday and had a full evaluation and was ultimately discharged home.  The patient lives at home without assistance.  He has been worsening, especially in the context of his left hip pain and progression.  He has been evaluated by [facility] and while there has been some discussion for possible total hip, he has high risk for surgery and has not been instituted.  In its place, he has been on Vicodin 1-2 daily p.r.n.  Approximately 1 week ago he was suspected to have a diagnosed UTI and placed on Bactrim and concurrently transitioned from Vicodin and oxycodone.  Since this time, he has had notable worsening of hallucinations.  He is seeing things that clearly are not there.  Otherwise, he does not have fevers, nausea, vomiting, headache, trauma, dysuria, hematuria, urinary frequency.  Urine cultures have since returned negative for UTI.  He was taken off of his Bactrim yesterday.  He still continues to take oxycodone.  He returns again today with continued hallucinations.       In the Emergency Department, between separate evaluations, he has had a negative head CT and his laboratories have returned all generally within normal limits.  Given failure to thrive, hallucinations and unsafe for returning home, the patient is coming in under observation for further evaluation and care.      Moisture Management:[AN1.1]  Incontinence Protocol[AN1.2]    Catheter secured?[AN1.1] Not applicable[AN1.2]    Current Diet / Nutrition:           Active Diet Order      Regular Diet Adult               Ricki Assessment and sub scores:   Ricki Score  Av  Min: 14  Max: 14     Labs:         Recent Labs   Lab Test  17   2154   HGB  13.0*   RBC  4.28*   WBC  10.8   PLT  287          Wound Assessment (location):[AN1.1]   BL lower  extremities[AN1.2]  Wound History:[AN1.1]  Pt reports he constantly has wounds to both legs from him bumping into things at home while in wheel chair.[AN1.2]    Wound Base:[AN1.1] Multiple tan/yellow/brown superfical[AN1.2],[AN1.1] non fluctuant healing scabs[AN1.2],[AN1.1] dry, non draining[AN1.2]     Specific Dimensions (length x width x depth, in cm) :[AN1.1]   L shin 3 x 1.5 x 0cm                                                                                            R shin upper 2.5 x 2 x 0cm                                                                                            R shin middle 4 x 3.5 x 0cm                                                                                            R shin lower 3.5 x 1.5 x 0cm[AN1.2]    Palpation of the wound bed:[AN1.1]  normal[AN1.2]    Slough appearance:[AN1.1]  Dry serous scabbing[AN1.2]    Periwound Skin:[AN1.1] intact epidermis, 1 plus edema[AN1.2]    Color:[AN1.1] multple areas of eccymosis[AN1.2]    Temperature[AN1.1]  cool[AN1.2]    Drainage:[AN1.1]  none[AN1.2]     Odor:[AN1.1] none[AN1.2]    Pain:[AN1.1]  absent[AN1.2]          Intervention:     Patient's chart evaluated.      Wound(s)[AN1.1] Assessed at bedside with nurse[AN1.2]    Orders[AN1.1]  Written[AN1.2]    Supplies[AN1.1]  Reviewed[AN1.2]    Discussed plan of care with[AN1.1] Patient and Nurse[AN1.2]          Assessment:[AN1.1]       Pt has multiple trauma wounds to BL lower extremities. Wounds are currently stable, non draining and no acute signs or symptoms of infection.  Plan is to keep areas clean and dry and keep skin well moisturized with sween 24.[AN1.2]         Plan:     Nursing to notify the Provider(s) and re-consult the [facility] Nurse if wound(s) deteriorate(s) or if the wound care plan needs reevaluation.    Plan of care for wound located on[AN1.1] BL lower extremities: Cleanse BL legs with water and gentle soap, pat dry and apply thin layer of sween 24 to BL feet and legs.[AN1.2]      [facility] Nurse[AN1.1] consult complete, please re consult with any changes or skin concerns.     Reviewed by Love Marie CWOCN[AN1.2]     Face to face time:[AN1.1] 15 Minutes[AN1.2]        Revision History        User Key Date/Time User Provider Type Action    > AN1.2 6/1/2017  9:51 AM Terrance Elmore, RN Registered Nurse Sign     AN1.1 6/1/2017  6:48 AM Terrance Elmore, LAUREN Registered Nurse             Progress Notes by Margoth Orr PT at 6/1/2017  8:38 AM     Author:  Margoth Orr PT Service:  (none) Author Type:  Physical Therapist    Filed:  6/1/2017  8:38 AM Date of Service:  6/1/2017  8:38 AM Creation Time:  6/1/2017  8:38 AM    Status:  Signed :  Margoth Orr PT (Physical Therapist)            06/01/17 0810   Quick Adds   Type of Visit Initial PT Evaluation   Living Environment   Lives With alone   Living Arrangements condominium   Home Accessibility no concerns   Number of Stairs to Enter Home 0   Number of Stairs Within Home 0   Self-Care   Usual Activity Tolerance fair   Current Activity Tolerance poor   Regular Exercise no   Equipment Currently Used at Home other (see comments);bath bench;grab bar  (motorized scooter)   Functional Level Prior   Ambulation 1-->assistive equipment   Transferring 1-->assistive equipment   Fall history within last six months yes   Number of times patient has fallen within last six months 12   Prior Functional Level Comment Frequent falls so got a scooter to avoid this. Also falls when sitting on EOB to ease hip and knee pain and falls asleep and slips off the bed   General Information   Onset of Illness/Injury or Date of Surgery - Date 05/31/17   Referring Physician Jay Ovalle MD   Patient/Family Goals Statement Rehab if paid for, if not unsure what he will do   Pertinent History of Current Problem (include personal factors and/or comorbidities that impact the POC) Admitted under observation status with hallucinations. PMH: CKD, COPD, HTN, recurrent  UTIs, LE edema, chronic L hip pain, seen in ED on 5/30 and DC home with hallucinations.   Precautions/Limitations fall precautions   Weight-Bearing Status - LLE full weight-bearing   Weight-Bearing Status - RLE full weight-bearing   Cognitive Status Examination   Orientation orientation to person, place and time   Level of Consciousness alert   Follows Commands and Answers Questions able to follow single-step instructions   Personal Safety and Judgment impaired   Pain Assessment   Patient Currently in Pain Yes, see Vital Sign flowsheet  (L hip and knee pain, improved from this AM)   Integumentary/Edema   Integumentary/Edema Comments Wounds on b shins. Redness present on shins with mild edema.   Posture    Posture Forward head position;Protracted shoulders   Range of Motion (ROM)   ROM Quick Adds No deficits were identified   Strength   Strength Comments R hip flex 3+/5, knee ext 4/5, DF 4/5. L LE NT due to hip and knee pain but pt states weakness present.   Bed Mobility   Bed Mobility Comments Supine to sit with mod A   Transfer Skills   Transfer Comments Sit to stand and pivot transfer to chair with mod A of 2   Gait   Gait Comments NA   Balance   Balance Comments Balance unsteady with transfers. SBA sitting EOB balance   General Therapy Interventions   Planned Therapy Interventions bed mobility training;transfer training;strengthening   Clinical Impression   Criteria for Skilled Therapeutic Intervention yes, treatment indicated   PT Diagnosis Difficulty transferring   Influenced by the following impairments Pain, dec strength, balance, activity tolerance   Functional limitations due to impairments Difficulty transferring   Clinical Presentation Evolving/Changing   Clinical Presentation Rationale medically working up   Clinical Decision Making (Complexity) Low complexity   Therapy Frequency` 3 times/week   Predicted Duration of Therapy Intervention (days/wks) 1 week   Anticipated Discharge Disposition Transitional  "Care Facility   Risk & Benefits of therapy have been explained Yes   Patient, Family & other staff in agreement with plan of care Yes   [facility] AM-PAC TM \"6 Clicks\"   2016, Trustees of [facility], under license to [employer].  All rights reserved.   6 Clicks Short Forms Basic Mobility Inpatient Short Form   [facility] AM-PAC  \"6 Clicks\" V.2 Basic Mobility Inpatient Short Form   1. Turning from your back to your side while in a flat bed without using bedrails? 4 - None   2. Moving from lying on your back to sitting on the side of a flat bed without using bedrails? 2 - A Lot   3. Moving to and from a bed to a chair (including a wheelchair)? 2 - A Lot   4. Standing up from a chair using your arms (e.g., wheelchair, or bedside chair)? 2 - A Lot   5. To walk in hospital room? 1 - Total   6. Climbing 3-5 steps with a railing? 1 - Total   Basic Mobility Raw Score (Score out of 24.Lower scores equate to lower levels of function) 12   Total Evaluation Time   Total Evaluation Time (Minutes) 15[EW1.1]        Revision History        User Key Date/Time User Provider Type Action    > EW1.1 6/1/2017  8:38 AM Margoth Orr PT Physical Therapist Sign            Progress Notes by Jay Ovalle MD at 5/31/2017  8:37 PM     Author:  Jay Ovalle MD Service:  Hospitalist Author Type:  Physician    Filed:  5/31/2017  8:38 PM Date of Service:  5/31/2017  8:37 PM Creation Time:  5/31/2017  8:37 PM    Status:  Signed :  Jay Ovalle MD (Physician)         xcover    Pain uncontrolled.  Will go ahead and order Norco (he had tolerated hydrocodone before transitioning to oxy).    Will have PharmD review if px was also on Valium as family claims, not on original list.  Would also contribute to possibility of hallucinations.    Jay Ovalle MD  [facility] Hospitalist   Pager: [phone][JK1.1]       Revision History        User Key Date/Time User Provider Type Action    > " "JK1.1 5/31/2017  8:38 PM Jay Ovalle MD Physician Sign            ED Notes signed by Leonor Ceja-Provider at 5/31/2017  8:13 PM      Author:  Scan, Non-Provider Service:  (none) Author Type:  (none)    Filed:  5/31/2017  8:13 PM Date of Service:  5/31/2017  8:12 PM Creation Time:  5/31/2017  8:13 PM    Status:  Signed :  Scan, Non-Provider     Scan on 5/31/2017  8:13 PM by Marcial Non-Provider : [facility] 1          Revision History        User Key Date/Time User Provider Type Action    > [N/A] 5/31/2017  8:13 PM Marcial Non-Provider (none) Sign            ED Notes by Faina De La Fuente RN at 5/31/2017  6:04 PM     Author:  Faina De La Fuente RN Service:  (none) Author Type:  Registered Nurse    Filed:  5/31/2017  6:37 PM Date of Service:  5/31/2017  6:04 PM Creation Time:  5/31/2017  6:18 PM    Status:  Addendum :  Faina De La Fuente, RN (Registered Nurse)         [facility]  ED Nurse Handoff Report    ED Chief complaint: Hallucinations (seen here yesterday for the same, sent home, told to stop his bactrim. continued his oxycodon but stopped bactrim yesterday, still hallucinating, seeing 'little boys behind the tv' did not want to come, placed on [facility] by PD  )      ED Diagnosis:   Final diagnoses:   Hallucinations   Medication reaction, initial encounter - possible       Code Status: see epic  Allergies: No Known Allergies    Activity level - Baseline/Home:  Lives in a condo, has left hip fracture. Uses w/c and transfers himself into w/c  Activity Level - Current:   1-2 assist into chair     Needed?: No    Isolation: No  Infection: Not Applicable    Bariatric?: No    Vital Signs:   Vitals:    05/31/17 1517   BP: (!) 133/92   Resp: 20   Temp: 98  F (36.7  C)   TempSrc: Oral   SpO2: 95%   Height: 1.651 m (5' 5\")       Cardiac Rhythm: ,        Pain level: 0-10 Pain Scale: 10    Is this patient confused?: alert and oriented but having visual " hallucinations    Patient Report: Initial Complaint: Pt was started on bactrim for UTI on 5/23 and was also started on oxycodone at that time because his vicodin was not enough to help him sleep at night, due to left hip pain. PCP has since called pt and stated stop bactrim, because UC was negative, however pt continued bactrim.  pt was seen in ED yesterday for having visual hallucinations, work up was negative and pt was encouraged to stop his bactrim and then his oxycodone if still hallucinating and d/c to home with his son. Since then, last night and this am pt has continued to hallucinate seeing people in his condo, that no one else sees. This am pt fell out of his w/c and called FD to help him into chair. Later this afternoon, he called EMS because he was hallucinating. Pt refused to come to ED, but EMS didn't feel he was safe due to weakness and hallucinations, so PD placed pt on a [place].   Focused Assessment: alert and oriented, recognizes that he is hallucinating but feels that his visions are very real. Pt stopped his bactrim yesterday , but has continued his oxycodone because it is the only thing that helps him sleep at night. Transfers with 1-2 to bedside chair.   Tests Performed: labs, UA, head CT  Abnormal Results: crt^  Treatments provided: none    Family Comments: family at bedside    OBS brochure/video discussed/provided to patient:[CR1.1] yes[CR1.2]    ED Medications:   Medications   sodium chloride (PF) 0.9% PF flush 3 mL (not administered)   sodium chloride (PF) 0.9% PF flush 3 mL (not administered)       Drips infusing?:  No      ED NURSE PHONE NUMBER: [phone][CR1.1]            Revision History        User Key Date/Time User Provider Type Action    > CR1.2 5/31/2017  6:37 PM Faina De La Fuente, RN Registered Nurse Addend     CR1.1 5/31/2017  6:18 PM Faina De La Fuente, RN Registered Nurse Sign            ED Notes by Kiya Dee at 5/31/2017  3:07 PM     Author:  Kiya Dee  Service:  (none) Author Type:  Unit Clerk    Filed:  5/31/2017  3:07 PM Date of Service:  5/31/2017  3:07 PM Creation Time:  5/31/2017  3:07 PM    Status:  Signed :  Kiya Dee (Unit Clerk)         Bed: ED16  Expected date: 5/31/17  Expected time:   Means of arrival:   Comments:  [id number]} 77M HOLD/halluc,fall       Revision History        User Key Date/Time User Provider Type Action    > RB1.1 5/31/2017  3:07 PM Kiya Dee Unit Clerk Sign                  Procedure Notes     No notes of this type exist for this encounter.         Progress Notes - Therapies (Notes from 05/31/17 through 06/03/17)      Progress Notes by Saranya Mendez RT at 6/3/2017  3:33 AM     Author:  Saranya Mendez RT Service:  (none) Author Type:  Respiratory Therapist    Filed:  6/3/2017  3:37 AM Date of Service:  6/3/2017  3:33 AM Creation Time:  6/3/2017  3:33 AM    Status:  Signed :  Saranya Mendez RT (Respiratory Therapist)         Pt on CPAP +10  21% throughout the night, gel pad and mepilex placed on under the mask. Pt tolerated well. Will continue to follow.[HA1.1]  6/3/2017  Saranya Mendez[HA1.2]       Revision History        User Key Date/Time User Provider Type Action    > HA1.2 6/3/2017  3:37 AM Saranya Mendez RT Respiratory Therapist Sign     HA1.1 6/3/2017  3:33 AM Saranya Mendez RT Respiratory Therapist             Progress Notes by Margoth Orr, PT at 6/1/2017  8:38 AM     Author:  Margoth Orr PT Service:  (none) Author Type:  Physical Therapist    Filed:  6/1/2017  8:38 AM Date of Service:  6/1/2017  8:38 AM Creation Time:  6/1/2017  8:38 AM    Status:  Signed :  Margoth Orr PT (Physical Therapist)            06/01/17 0810   Quick Adds   Type of Visit Initial PT Evaluation   Living Environment   Lives With alone   Living Arrangements condominium   Home Accessibility no concerns   Number of Stairs to Enter Home 0   Number of Stairs Within Home 0   Self-Care   Usual Activity  Tolerance fair   Current Activity Tolerance poor   Regular Exercise no   Equipment Currently Used at Home other (see comments);bath bench;grab bar  (motorized scooter)   Functional Level Prior   Ambulation 1-->assistive equipment   Transferring 1-->assistive equipment   Fall history within last six months yes   Number of times patient has fallen within last six months 12   Prior Functional Level Comment Frequent falls so got a scooter to avoid this. Also falls when sitting on EOB to ease hip and knee pain and falls asleep and slips off the bed   General Information   Onset of Illness/Injury or Date of Surgery - Date 05/31/17   Referring Physician Jay Ovalle MD   Patient/Family Goals Statement Rehab if paid for, if not unsure what he will do   Pertinent History of Current Problem (include personal factors and/or comorbidities that impact the POC) Admitted under observation status with hallucinations. PMH: CKD, COPD, HTN, recurrent UTIs, LE edema, chronic L hip pain, seen in ED on 5/30 and DC home with hallucinations.   Precautions/Limitations fall precautions   Weight-Bearing Status - LLE full weight-bearing   Weight-Bearing Status - RLE full weight-bearing   Cognitive Status Examination   Orientation orientation to person, place and time   Level of Consciousness alert   Follows Commands and Answers Questions able to follow single-step instructions   Personal Safety and Judgment impaired   Pain Assessment   Patient Currently in Pain Yes, see Vital Sign flowsheet  (L hip and knee pain, improved from this AM)   Integumentary/Edema   Integumentary/Edema Comments Wounds on b shins. Redness present on shins with mild edema.   Posture    Posture Forward head position;Protracted shoulders   Range of Motion (ROM)   ROM Quick Adds No deficits were identified   Strength   Strength Comments R hip flex 3+/5, knee ext 4/5, DF 4/5. L LE NT due to hip and knee pain but pt states weakness present.   Bed Mobility   Bed Mobility  "Comments Supine to sit with mod A   Transfer Skills   Transfer Comments Sit to stand and pivot transfer to chair with mod A of 2   Gait   Gait Comments NA   Balance   Balance Comments Balance unsteady with transfers. SBA sitting EOB balance   General Therapy Interventions   Planned Therapy Interventions bed mobility training;transfer training;strengthening   Clinical Impression   Criteria for Skilled Therapeutic Intervention yes, treatment indicated   PT Diagnosis Difficulty transferring   Influenced by the following impairments Pain, dec strength, balance, activity tolerance   Functional limitations due to impairments Difficulty transferring   Clinical Presentation Evolving/Changing   Clinical Presentation Rationale medically working up   Clinical Decision Making (Complexity) Low complexity   Therapy Frequency` 3 times/week   Predicted Duration of Therapy Intervention (days/wks) 1 week   Anticipated Discharge Disposition Transitional Care Facility   Risk & Benefits of therapy have been explained Yes   Patient, Family & other staff in agreement with plan of care Yes   [facility] TM \"6 Clicks\"   2016, Trustees of [facility], under license to [employer].  All rights reserved.   6 Clicks Short Forms Basic Mobility Inpatient Short Form   [facility]  \"6 Clicks\" V.2 Basic Mobility Inpatient Short Form   1. Turning from your back to your side while in a flat bed without using bedrails? 4 - None   2. Moving from lying on your back to sitting on the side of a flat bed without using bedrails? 2 - A Lot   3. Moving to and from a bed to a chair (including a wheelchair)? 2 - A Lot   4. Standing up from a chair using your arms (e.g., wheelchair, or bedside chair)? 2 - A Lot   5. To walk in hospital room? 1 - Total   6. Climbing 3-5 steps with a railing? 1 - Total   Basic Mobility Raw Score (Score out of 24.Lower scores equate to lower levels of function) 12   Total Evaluation Time   Total " Evaluation Time (Minutes) 15[EW1.1]        Revision History        User Key Date/Time User Provider Type Action    > EW1.1 6/1/2017  8:38 AM Margoth Orr, PT Physical Therapist Sign

## 2017-05-31 NOTE — PHARMACY-ADMISSION MEDICATION HISTORY
Admission medication history interview status for the 5/31/2017  admission is complete. See EPIC admission navigator for prior to admission medications     Medication history source reliability:Good    Actions taken by pharmacist (provider contacted, etc): information taken from [facility] list obtained by daughter.     Additional medication history information not noted on PTA med list :None    Medication reconciliation/reorder completed by provider prior to medication history? No    Time spent in this activity: 25 min    Prior to Admission medications    Medication Sig Last Dose Taking? Auth Provider   buPROPion (WELLBUTRIN SR) 150 MG 12 hr tablet Take 150 mg by mouth 2 times daily 5/31/2017 at am Yes Unknown, Entered By History   NONFORMULARY Hydrocortisone 2.5% cream mixed 1:1 with Vanicream apply BID from neck down to affected area 5/31/2017 at am Yes Unknown, Entered By History   FINASTERIDE PO Take 5 mg by mouth daily 5/31/2017 at am Yes Unknown, Entered By History   OXYCODONE HCL PO Take 5 mg by mouth every 6 hours as needed prn Yes Unknown, Entered By History   Ca Carbonate-Mag Hydroxide (ROLAIDS PO) Take by mouth as needed prn Yes Unknown, Entered By History   TAMSULOSIN HCL PO Take 0.4 mg by mouth At Bedtime 5/30/2017 at hs Yes Unknown, Entered By History   fluticasone-salmeterol (ADVAIR) 500-50 MCG/DOSE diskus inhaler Inhale 1 puff into the lungs every 12 hours 5/31/2017 at am Yes Reported, Patient   albuterol (PROAIR HFA, PROVENTIL HFA, VENTOLIN HFA) 108 (90 BASE) MCG/ACT inhaler Inhale 2 puffs into the lungs every 6 hours as needed  prn Yes Reported, Patient   ALLOPURINOL PO Take 100 mg by mouth daily  5/31/2017 at am Yes Reported, Patient   ATENOLOL PO Take 50 mg by mouth daily  5/31/2017 at am Yes Reported, Patient   GABAPENTIN PO Take 300 mg by mouth At Bedtime  5/30/2017 at hs Yes Reported, Patient   FUROSEMIDE PO Take 20 mg by mouth daily  5/31/2017 at am Yes Reported, Patient   multivitamin,  therapeutic with minerals (MULTI-VITAMIN) TABS Take 1 tablet by mouth daily 5/31/2017 at am Yes Reported, Patient   NITROGLYCERIN SL Place 0.4 mg under the tongue prn Yes Reported, Patient   ATORVASTATIN CALCIUM PO Take 20 mg by mouth daily    Reported, Patient

## 2017-05-31 NOTE — IP AVS SNAPSHOT
"[place] [address] [facility]: [phone]                                              INTERAGENCY TRANSFER FORM - PHYSICIAN ORDERS   2017                    Hospital Admission Date: 2017  EMILIA ALMARAZ   : 1940  Sex: Male        Attending Provider: Ciera Mccauley MD     Allergies:  No Known Allergies    Infection:  None   Service:  HOSPITALIST    Ht:  1.651 m (5' 5\")   Wt:  90.7 kg (200 lb)   Admission Wt:  --    BMI:  33.28 kg/m 2   BSA:  2.04 m 2            Patient PCP Information     Provider PCP Type    Jerrod Graves MD General      ED Clinical Impression     Diagnosis Description Comment Added By Time Added    Hallucinations [R44.3] Hallucinations [R44.3]  Trierweiler, Chad A, MD 2017  5:34 PM    Medication reaction, initial encounter [T88.7XXA] Medication reaction, initial encounter - possible  Trierweiler, Chad A, MD 2017  5:34 PM      Hospital Problems as of 6/3/2017              Priority Class Noted POA    Failure to thrive in adult Medium  2017 Yes    Hallucination, drug-induced (H) Medium  2017 Yes      Non-Hospital Problems as of 6/3/2017     None      Code Status History     Date Active Date Inactive Code Status Order ID Comments User Context    6/3/2017 11:17 AM  Full Code [id number]  Ciera Mccauley MD Outpatient    2017  7:32 PM 6/3/2017 11:17 AM Full Code [id number]  Jay Ovalle MD Inpatient         Medication Review      START taking        Dose / Directions Comments    acetaminophen 325 MG tablet   Commonly known as:  TYLENOL   Used for:  Other chronic pain        Dose:  650 mg   Take 2 tablets (650 mg) by mouth 3 times daily   Quantity:  100 tablet   Refills:  0        buPROPion 150 MG 24 hr tablet   Commonly known as:  WELLBUTRIN XL   Used for:  Hallucinations   Replaces:  buPROPion 150 MG 12 hr tablet        Dose:  150 mg   Take 1 tablet (150 mg) by mouth daily for 7 days   Quantity:  7 tablet   Refills:  0 "        HYDROcodone-acetaminophen 5-325 MG per tablet   Commonly known as:  NORCO   Used for:  Other chronic pain        Dose:  1 tablet   Take 1 tablet by mouth every 4 hours as needed for moderate to severe pain   Quantity:  21 tablet   Refills:  0        * QUEtiapine 25 MG tablet   Commonly known as:  SEROquel   Used for:  Hallucinations        Dose:  25 mg   Take 1 tablet (25 mg) by mouth At Bedtime for 7 days   Quantity:  7 tablet   Refills:  0        * QUEtiapine 25 MG tablet   Commonly known as:  SEROquel   Used for:  Hallucinations        Dose:  12.5 mg   Take 0.5 tablets (12.5 mg) by mouth every 6 hours as needed (halucinations)   Quantity:  14 tablet   Refills:  0        * Notice:  This list has 2 medication(s) that are the same as other medications prescribed for you. Read the directions carefully, and ask your doctor or other care provider to review them with you.      CONTINUE these medications which may have CHANGED, or have new prescriptions. If we are uncertain of the size of tablets/capsules you have at home, strength may be listed as something that might have changed.        Dose / Directions Comments    furosemide 40 MG tablet   Commonly known as:  LASIX   This may have changed:    - medication strength  - how much to take  - additional instructions   Used for:  Localized edema        Dose:  40 mg   Start taking on:  6/4/2017   Take 1 tablet (40 mg) by mouth daily Hold for SBP<100   Quantity:  7 tablet   Refills:  0        gabapentin 300 MG capsule   Commonly known as:  NEURONTIN   This may have changed:  medication strength   Used for:  Other chronic pain        Dose:  300 mg   Take 1 capsule (300 mg) by mouth At Bedtime for 7 days   Quantity:  7 capsule   Refills:  0          CONTINUE these medications which have NOT CHANGED        Dose / Directions Comments    albuterol 108 (90 BASE) MCG/ACT Inhaler   Commonly known as:  PROAIR HFA/PROVENTIL HFA/VENTOLIN HFA        Dose:  2 puff   Inhale 2 puffs  into the lungs every 6 hours as needed   Refills:  0        ALLOPURINOL PO        Dose:  100 mg   Take 100 mg by mouth daily   Refills:  0        ATENOLOL PO        Dose:  50 mg   Take 50 mg by mouth daily   Refills:  0        ATORVASTATIN CALCIUM PO        Dose:  20 mg   Take 20 mg by mouth daily   Refills:  0        FINASTERIDE PO        Dose:  5 mg   Take 5 mg by mouth daily   Refills:  0        fluticasone-salmeterol 500-50 MCG/DOSE diskus inhaler   Commonly known as:  ADVAIR        Dose:  1 puff   Inhale 1 puff into the lungs every 12 hours   Refills:  0        Multi-vitamin Tabs tablet        Dose:  1 tablet   Take 1 tablet by mouth daily   Refills:  0        NITROGLYCERIN SL        Dose:  0.4 mg   Place 0.4 mg under the tongue   Refills:  0        NONFORMULARY        Hydrocortisone 2.5% cream mixed 1:1 with Vanicream apply BID from neck down to affected area   Refills:  0        ROLAIDS PO        Take by mouth as needed   Refills:  0        TAMSULOSIN HCL PO        Dose:  0.4 mg   Take 0.4 mg by mouth At Bedtime   Refills:  0          STOP taking     buPROPion 150 MG 12 hr tablet   Commonly known as:  WELLBUTRIN SR   Replaced by:  buPROPion 150 MG 24 hr tablet           OXYCODONE HCL PO                   Summary of Visit     Reason for your hospital stay       Further evaluation of hallucinations due to medication side effect.             After Care     Activity - Up with nursing assistance           Additional Discharge Instructions       CPAP home settings every night.       Advance Diet as Tolerated       Follow this diet upon discharge: Orders Placed This Encounter      Regular Diet Adult       Daily weights       Call Provider for weight gain of more than 2 pounds per day or 5 pounds per week.       Fall precautions           General info for SNF       Length of Stay Estimate: Short Term Care: Estimated # of Days <30  Condition at Discharge: Improving  Level of care:skilled   Rehabilitation Potential:  Fair  Admission H&P remains valid and up-to-date: Yes  Recent Chemotherapy: N/A  Use Nursing Home Standing Orders: Yes       Intake and output       Every shift       Mantoux instructions       Give two-step Mantoux (PPD) Per Facility Policy Yes             Referrals     Physical Therapy Adult Consult       Evaluate and treat as clinically indicated.    Reason:  Deconditioned and severe left hip arthritis.             Follow-Up Appointment Instructions     Future Labs/Procedures    Follow Up and recommended labs and tests     Comments:    Follow up with alf physician.  The following labs/tests are recommended: BMP. Increased lasix prior to discharge, adjust as necessary to aid with lower extremity edema and wound healing.      Follow-Up Appointment Instructions     Follow Up and recommended labs and tests       Follow up with alf physician.  The following labs/tests are recommended: BMP. Increased lasix prior to discharge, adjust as necessary to aid with lower extremity edema and wound healing.             Statement of Approval     Ordered          06/03/17 1118  I have reviewed and agree with all the recommendations and orders detailed in this document.  EFFECTIVE NOW     Approved and electronically signed by:  Ciera Mccauley MD

## 2017-05-31 NOTE — ED NOTES
Bed: ED16  Expected date: 5/31/17  Expected time:   Means of arrival:   Comments:  [id number]} 77M HOLD/halluc,fall

## 2017-06-01 ENCOUNTER — APPOINTMENT (OUTPATIENT)
Dept: GENERAL RADIOLOGY | Facility: CLINIC | Age: 77
DRG: 897 | End: 2017-06-01
Attending: ORTHOPAEDIC SURGERY
Payer: MEDICARE

## 2017-06-01 ENCOUNTER — APPOINTMENT (OUTPATIENT)
Dept: PHYSICAL THERAPY | Facility: CLINIC | Age: 77
DRG: 897 | End: 2017-06-01
Attending: HOSPITALIST
Payer: MEDICARE

## 2017-06-01 LAB
ANION GAP SERPL CALCULATED.3IONS-SCNC: 10 MMOL/L (ref 3–14)
BUN SERPL-MCNC: 21 MG/DL (ref 7–30)
CALCIUM SERPL-MCNC: 9.2 MG/DL (ref 8.5–10.1)
CHLORIDE SERPL-SCNC: 104 MMOL/L (ref 94–109)
CO2 SERPL-SCNC: 25 MMOL/L (ref 20–32)
CREAT SERPL-MCNC: 1.32 MG/DL (ref 0.66–1.25)
GFR SERPL CREATININE-BSD FRML MDRD: 52 ML/MIN/1.7M2
GLUCOSE SERPL-MCNC: 93 MG/DL (ref 70–99)
POTASSIUM SERPL-SCNC: 3.9 MMOL/L (ref 3.4–5.3)
SODIUM SERPL-SCNC: 139 MMOL/L (ref 133–144)

## 2017-06-01 PROCEDURE — A9270 NON-COVERED ITEM OR SERVICE: HCPCS | Mod: GY | Performed by: HOSPITALIST

## 2017-06-01 PROCEDURE — 25000132 ZZH RX MED GY IP 250 OP 250 PS 637: Mod: GY | Performed by: PSYCHIATRY & NEUROLOGY

## 2017-06-01 PROCEDURE — 99211 OFF/OP EST MAY X REQ PHY/QHP: CPT

## 2017-06-01 PROCEDURE — 73523 X-RAY EXAM HIPS BI 5/> VIEWS: CPT

## 2017-06-01 PROCEDURE — A9270 NON-COVERED ITEM OR SERVICE: HCPCS | Mod: GY | Performed by: INTERNAL MEDICINE

## 2017-06-01 PROCEDURE — A9270 NON-COVERED ITEM OR SERVICE: HCPCS | Mod: GY | Performed by: PSYCHIATRY & NEUROLOGY

## 2017-06-01 PROCEDURE — 97161 PT EVAL LOW COMPLEX 20 MIN: CPT | Mod: GP

## 2017-06-01 PROCEDURE — 25000132 ZZH RX MED GY IP 250 OP 250 PS 637: Mod: GY | Performed by: HOSPITALIST

## 2017-06-01 PROCEDURE — 40000193 ZZH STATISTIC PT WARD VISIT

## 2017-06-01 PROCEDURE — 99222 1ST HOSP IP/OBS MODERATE 55: CPT | Performed by: PSYCHIATRY & NEUROLOGY

## 2017-06-01 PROCEDURE — 36415 COLL VENOUS BLD VENIPUNCTURE: CPT | Performed by: HOSPITALIST

## 2017-06-01 PROCEDURE — 99232 SBSQ HOSP IP/OBS MODERATE 35: CPT | Performed by: INTERNAL MEDICINE

## 2017-06-01 PROCEDURE — 97530 THERAPEUTIC ACTIVITIES: CPT | Mod: GP

## 2017-06-01 PROCEDURE — 80048 BASIC METABOLIC PNL TOTAL CA: CPT | Performed by: HOSPITALIST

## 2017-06-01 PROCEDURE — G0378 HOSPITAL OBSERVATION PER HR: HCPCS

## 2017-06-01 PROCEDURE — 25000132 ZZH RX MED GY IP 250 OP 250 PS 637: Mod: GY | Performed by: INTERNAL MEDICINE

## 2017-06-01 RX ORDER — QUETIAPINE FUMARATE 25 MG/1
25 TABLET, FILM COATED ORAL AT BEDTIME
Status: DISCONTINUED | OUTPATIENT
Start: 2017-06-01 | End: 2017-06-03 | Stop reason: HOSPADM

## 2017-06-01 RX ORDER — BUPROPION HYDROCHLORIDE 100 MG/1
100 TABLET, EXTENDED RELEASE ORAL 2 TIMES DAILY
Status: DISCONTINUED | OUTPATIENT
Start: 2017-06-01 | End: 2017-06-01

## 2017-06-01 RX ORDER — LANOLIN ALCOHOL/MO/W.PET/CERES
3 CREAM (GRAM) TOPICAL AT BEDTIME
Status: DISCONTINUED | OUTPATIENT
Start: 2017-06-01 | End: 2017-06-03 | Stop reason: HOSPADM

## 2017-06-01 RX ORDER — BUPROPION HYDROCHLORIDE 150 MG/1
150 TABLET ORAL DAILY
Status: DISCONTINUED | OUTPATIENT
Start: 2017-06-02 | End: 2017-06-03 | Stop reason: HOSPADM

## 2017-06-01 RX ORDER — LIDOCAINE 50 MG/G
1 PATCH TOPICAL
Status: DISCONTINUED | OUTPATIENT
Start: 2017-06-01 | End: 2017-06-03 | Stop reason: HOSPADM

## 2017-06-01 RX ADMIN — ACETAMINOPHEN 650 MG: 325 TABLET, FILM COATED ORAL at 21:08

## 2017-06-01 RX ADMIN — ATENOLOL 50 MG: 50 TABLET ORAL at 10:55

## 2017-06-01 RX ADMIN — TAMSULOSIN HYDROCHLORIDE 0.4 MG: 0.4 CAPSULE ORAL at 21:10

## 2017-06-01 RX ADMIN — ALLOPURINOL 100 MG: 100 TABLET ORAL at 10:58

## 2017-06-01 RX ADMIN — ACETAMINOPHEN 650 MG: 325 TABLET, FILM COATED ORAL at 10:57

## 2017-06-01 RX ADMIN — FUROSEMIDE 20 MG: 20 TABLET ORAL at 10:55

## 2017-06-01 RX ADMIN — LIDOCAINE 1 PATCH: 50 PATCH TOPICAL at 21:06

## 2017-06-01 RX ADMIN — QUETIAPINE FUMARATE 25 MG: 25 TABLET, FILM COATED ORAL at 21:10

## 2017-06-01 RX ADMIN — ACETAMINOPHEN 650 MG: 325 TABLET, FILM COATED ORAL at 16:35

## 2017-06-01 RX ADMIN — MELATONIN 3 MG: 3 TAB ORAL at 21:10

## 2017-06-01 RX ADMIN — GABAPENTIN 300 MG: 300 CAPSULE ORAL at 21:10

## 2017-06-01 NOTE — PROGRESS NOTES
06/01/17 0810   Quick Adds   Type of Visit Initial PT Evaluation   Living Environment   Lives With alone   Living Arrangements condominium   Home Accessibility no concerns   Number of Stairs to Enter Home 0   Number of Stairs Within Home 0   Self-Care   Usual Activity Tolerance fair   Current Activity Tolerance poor   Regular Exercise no   Equipment Currently Used at Home other (see comments);bath bench;grab bar  (motorized scooter)   Functional Level Prior   Ambulation 1-->assistive equipment   Transferring 1-->assistive equipment   Fall history within last six months yes   Number of times patient has fallen within last six months 12   Prior Functional Level Comment Frequent falls so got a scooter to avoid this. Also falls when sitting on EOB to ease hip and knee pain and falls asleep and slips off the bed   General Information   Onset of Illness/Injury or Date of Surgery - Date 05/31/17   Referring Physician Jay Ovalle MD   Patient/Family Goals Statement Rehab if paid for, if not unsure what he will do   Pertinent History of Current Problem (include personal factors and/or comorbidities that impact the POC) Admitted under observation status with hallucinations. PMH: CKD, COPD, HTN, recurrent UTIs, LE edema, chronic L hip pain, seen in ED on 5/30 and DC home with hallucinations.   Precautions/Limitations fall precautions   Weight-Bearing Status - LLE full weight-bearing   Weight-Bearing Status - RLE full weight-bearing   Cognitive Status Examination   Orientation orientation to person, place and time   Level of Consciousness alert   Follows Commands and Answers Questions able to follow single-step instructions   Personal Safety and Judgment impaired   Pain Assessment   Patient Currently in Pain Yes, see Vital Sign flowsheet  (L hip and knee pain, improved from this AM)   Integumentary/Edema   Integumentary/Edema Comments Wounds on b shins. Redness present on shins with mild edema.   Posture    Posture Forward  "head position;Protracted shoulders   Range of Motion (ROM)   ROM Quick Adds No deficits were identified   Strength   Strength Comments R hip flex 3+/5, knee ext 4/5, DF 4/5. L LE NT due to hip and knee pain but pt states weakness present.   Bed Mobility   Bed Mobility Comments Supine to sit with mod A   Transfer Skills   Transfer Comments Sit to stand and pivot transfer to chair with mod A of 2   Gait   Gait Comments NA   Balance   Balance Comments Balance unsteady with transfers. SBA sitting EOB balance   General Therapy Interventions   Planned Therapy Interventions bed mobility training;transfer training;strengthening   Clinical Impression   Criteria for Skilled Therapeutic Intervention yes, treatment indicated   PT Diagnosis Difficulty transferring   Influenced by the following impairments Pain, dec strength, balance, activity tolerance   Functional limitations due to impairments Difficulty transferring   Clinical Presentation Evolving/Changing   Clinical Presentation Rationale medically working up   Clinical Decision Making (Complexity) Low complexity   Therapy Frequency` 3 times/week   Predicted Duration of Therapy Intervention (days/wks) 1 week   Anticipated Discharge Disposition Transitional Care Facility   Risk & Benefits of therapy have been explained Yes   Patient, Family & other staff in agreement with plan of care Yes   [facility] [employer] \"6 Clicks\"   2016, Trustees of [facility], under license to [employer].  All rights reserved.   6 Clicks Short Forms Basic Mobility Inpatient Short Form   [email or web address]  \"6 Clicks\" V.2 Basic Mobility Inpatient Short Form   1. Turning from your back to your side while in a flat bed without using bedrails? 4 - None   2. Moving from lying on your back to sitting on the side of a flat bed without using bedrails? 2 - A Lot   3. Moving to and from a bed to a chair (including a wheelchair)? 2 - A Lot   4. Standing up from a chair using your arms " (e.g., wheelchair, or bedside chair)? 2 - A Lot   5. To walk in hospital room? 1 - Total   6. Climbing 3-5 steps with a railing? 1 - Total   Basic Mobility Raw Score (Score out of 24.Lower scores equate to lower levels of function) 12   Total Evaluation Time   Total Evaluation Time (Minutes) 15

## 2017-06-01 NOTE — CONSULTS
"PSYCHIATRY CONSULTATION       REQUESTING PHYSICIAN:  Dr. Jay Ovalle      REASON FOR CONSULTATION:  Hallucinations.         IDENTIFYING DATA:  Mr. Deion Diaz is a 77-year-old   male admitted with hallucinations.        CHIEF COMPLAINT:  \"I'm seeing birds and it looks foggy.\"       HISTORY OF PRESENT ILLNESS:  Deion is a 77-year-old man with a notable history of chronic kidney disease, COPD, hypertension, hyperlipidemia, BPH and recurrent UTIs, along with lower extremity edema and ulcerations, comes to the emergency room with hallucinations.  He was seen in the ER and sent home and again came back.  He has been dealing with hip issues and had recent changes in his pain medications, had been on Vicodin at one point.  He also got moved over to oxycodone and they treated a UTI with Bactrim.  He continues to report these hallucinations and says that there were children outside of his residence.  He is not hearing voices, not feeling depressed or reporting thoughts of self-harm.  He notes that he has been on Wellbutrin  mg b.i.d. for a few years, initially to treat smoking cessation, but he tells me that his kids told him \"it made my mood better, so I kept taking it.\"  He denies other psychiatric symptoms at this time other than indicating for the last several days he has not been sleeping well at all.      On further questioning, he denies a history of antoine, psychosis, generalized anxiety disorder, panic disorder, obsessive-compulsive disorder, eating disorder history, trauma history or ADHD.  He has never been hospitalized for psychiatric reasons and as far as I know, he has never been on other antidepressants with the exception of the Wellbutrin.      PAST PSYCHIATRIC HISTORY:  As above.      PAST CHEMICAL DEPENDENCY HISTORY:  Notable for some social use of alcohol.  He denies previous treatment or excessive use.      PAST MEDICAL HISTORY:  Per chart review includes COPD, osteoarthritis, BPH, " "restless leg syndrome, recurrent UTIs, cataracts, chronic kidney disease, stage III, obstructive sleep apnea, hypertension, hyperlipidemia, coronary artery disease, back surgery, TURP, tonsillectomy, coronary stent 2013.      MEDICATIONS ON ADMISSION:  Listed, Wellbutrin  mg b.i.d., finasteride, oxycodone, tamsulosin, Lasix, multivitamin, Advair, albuterol, allopurinol, atenolol, atorvastatin and gabapentin 300 mg each day at bedtime.      FAMILY HISTORY:  Denies mental illness, chemical dependency or suicide.      SOCIAL HISTORY:  The patient came from a family of 10 children.  He is , lives independently and has 3 children of his own.  He used to be a .        REVIEW OF SYSTEMS:  A 10-point review of systems is unchanged from Dr. Ovalle's initial H&P 05/31/2017 at 7:19 p.m.  Most recent vital signs:  Temperature 97.4, blood pressure 133/74, respiratory rate 20, pulse 82, oxygen saturation 92%.      MENTAL STATUS EXAMINATION:  Appearance:  The patient is a frail man lying in bed.  He is disheveled and has poor dentition.  He is a fair historian.  Speech is of normal rate, flow and tone, use of language appropriate.  Motor exam is calm.  Coordination, station, gait not tested.  Muscle strength and tone slightly diminished.  Affect is pleasant.  Mood \"okay.\"  Thought process is logical, coherent and goal directed.  No loosening of associations, no flight of ideas.  No formal thought disorder.  Thought content notable for some visual hallucinations including seeing birds and seeing \"fog.\"  He denies thoughts of wanting to hurt self or others.  Insight and judgment fair.  Cognitive exam, the patient is alert, oriented x2/3.  Concentration impaired.  Recent memory poor, remote memory grossly intact.  General fund of knowledge average.      IMPRESSION:  The patient is a 77-year-old man presenting with a delirium which is likely multifactorial.  Wellbutrin could be an aggravating factor, though he " has been on it long-term.  I would suggest we reduce that dose due to the fact that it is a dopamine agonist and could worsen hallucinations and disrupt sleep.  Adding a small dose of quetiapine is reasonable to help sleep and use on a p.r.n. basis for hallucinations.  Simplification of his medications is also appropriate including limiting heavy doses of opiate analgesics which may be contributing to this situation.      DIAGNOSES:   1.  Delirium, multifactorial.   2.  Recurrent urinary tract infection by history.   3.  Chronic hip pain.      PLAN:   1.  Recommend we utilized Seroquel 25 mg each day at bedtime, 12.5 mg q.6 h. p.r.n. for hallucinations.   2.  Change Wellbutrin SR to Wellbutrin  mg daily.  This will limit the impact on hallucinations and the potential this drug could disrupt sleep and aggravate some of the psychosis.   3.  We will follow as needed.         AILIN COHEN MD             D: 2017 08:10   T: 2017 08:48   MT: [email or web address]      Name:     EMILIA ALMARAZ   MRN:      [id number]        Account:       [id number]   :      1940           Consult Date:  2017      Document: [id number]

## 2017-06-01 NOTE — PROGRESS NOTES
"Family member came to staff reporting that pt was seeing things. \"the computer mouse looked like a mouse\" and \"3 dogs running across the bottom of the information board\". Pt also sees fuzzy, hazy air looking out of his room toward nursing station. Pt was A&Ox4. RN asked pt to inform staff of any future events.   "

## 2017-06-01 NOTE — CONSULTS
"Care Transition Initial Assessment - RN    Met with: Patient and his Daughter Mackenzie    DATA   Active Problems:    Failure to thrive in adult       Cognitive Status: awake, alert and oriented.        Contact information and PCP information verified: Yes    Lives With: alone  Living Arrangements: condominium    Insurance concerns: Pt is Observation status, will not have coverage for TCU    ASSESSMENT  Patient currently receives the following services:  Pt has a person he hires to help with cleaning, cooking and errands     Identified issues/concerns regarding health management: Pt has osteoarthritis with pretty severe left hip pain to the point he no longer is able to walk.  He has a motorized scooter.  He tends to sit on the edge of his bed in an attempt to relieve his pain and then falls asleep in the sitting position and then often slips off the side of the bed onto floor.  His hallucinations have resolved.  Dr Ospina has seen pt and imaging is pending.  Discussed rehab with pt.  He is aware that this will be private pay.  He shared that he knows he needs to move into an assisted living and has been looking at facilities that would accept MA once his \"money runs out\".  He is not totally against the idea of transitioning to a TCU.  He will think more about this.  His son and daughter live close to him.  There is no family that is able to stay with him.  SW will see him  PLAN  Financial costs for the patient include Observation status  Patient given options and choices for discharge: PT is recommending TCU.  Pt understands that TCU will be private pay.  Pt's Daughter Mackenzie would like referral into [facility].  She lives four blocks from this facility.   Patient/family is agreeable to the plan? Mackenzie felt that pt transitioning home is not an option due to his falling.  She will have a discussion with pt concerning need for TCU with transition to an prison    Discharge Planner   Discharge Plans in progress:SW will place " referral to [facility]  Barriers to discharge plan: Private pay, pain control  Follow up plan: Anticipate discharge to a TCU tomorrow pending imaging and assessment    Care  (CTS) will continue to follow as needed.

## 2017-06-01 NOTE — ED PROVIDER NOTES
"CHIEF COMPLAINT:  \"I'm still having hallucinations.\"      HISTORY OF PRESENT ILLNESS:  Mr. Deion Diaz is a very pleasant 77-year-old man presenting to the ER with concerns of having hallucinations.  I invite the reader to review his visit by my colleague, Dr. Garza, yesterday in this Emergency Department for the exact same issues.  He was started on Bactrim for what was thought to be a urinary tract infection approximately 5 days ago.  He was also started on oxycodone for some chronic left hip pain that he has been dealing with for years.  Since then, he has been having episodes of hallucinations where he sees people that are physically there, but they will disappear when he touches them.  After his visit here yesterday, his son urged him to be discharged home.  The son stayed with him last night.  The patient notes that there was \"a party at my house\" where there were people dancing on his veranda and in his bedroom, though his son continued to reassure him that they were not actually there and the patient felt that he could \"will them to go away\".  However, the patient was left alone today while his son was at work and he began to see people there.  He notes that at one point he slipped off of the edge of his bed onto the floor and he called 911 to help get him up.  He denies harming himself during this fall, though did speak to the paramedics about the fact that he thought that there were some children in the house.  They checked the house and did not find any one there.  After the paramedics left, his brother came over and again, the patient felt very confident that a grandmother had snuck into his house and that the grandmother's 2 children were running about the house and playing.  His brother checked all of the closet to look for anyone and found no one there, telling him that he was simply having hallucinations.  Finally, the patient called 911 again to have the police come and accost these 2 little " boys that were playing behind the television set.  With this, they placed him on a health officer hold and brought him to the ER.      On my assessment here, Mr. Diaz has no complaints.  He denies any new pain.  He denies fever.  He denies headache.  He seems to vacillate between realizing that these are hallucinations while at the next moment expressing his frustration that children were in his house playing with electrical cords and that they could harm themselves.      PAST MEDICAL HISTORY:  Pertinent for his chronic left hip pain along with emphysema, chronic kidney disease.      MEDICATIONS:  He takes Advair, albuterol, atenolol, allopurinol, atorvastatin, bupropion, gabapentin, aspirin, furosemide, Flomax.      ALLERGIES:  None.      SOCIAL HISTORY:  He is a former smoker.  He does not drink to excess.      REVIEW OF SYSTEMS:  Pertinent positives and negatives as above.  All other systems are reviewed as negative.      PHYSICAL EXAMINATION:   VITAL SIGNS:  Blood pressure 133/92, heart rate of 83, respiratory rate of 20, temperature 98, satting 95% on room air.   GENERAL:  Mr. Diaz is an elderly gentleman resting comfortably as he sits on the edge of the bed.   EYES:  Pupils are equal, round, reactive to light with extraocular movements intact.   ENT:  The patient has no rhinorrhea and he has moist mucous membranes.   CARDIOVASCULAR:  Regular rate and rhythm without murmurs, gallops or rubs.   RESPIRATORY:  Clear to auscultation bilaterally without wheezes, rales or rhonchi.  There are a few scattered wheezes with decreased breath sounds throughout, but he shows no increased work of breathing.   GASTROINTESTINAL:  Positive bowel sounds, soft, nontender, nondistended.   MUSCULOSKELETAL:  He ranges all extremities without any difficulty.  The left hip is certainly sore, but he is able to bear weight.   SKIN:  Warm and dry.  His lower extremities have multiple areas of contusions and open wounds where he has  struck his legs against the wall or against the floor.  However, none of these appear to be infected.   NEUROLOGIC:  Cranial nerves II-XII intact.  5/5 strength in all extremities.  Normal sensation throughout.   PSYCHIATRIC:  Normal affect.  He does admit to these hallucinations but otherwise seems to be very clear and appropriate with today's date.      LABORATORY AND DIAGNOSTICS:  The patient had an IV established and labs obtained.  This shows an unremarkable CBC.  His chemistry panel shows some mild renal insufficiency with creatinine of 1.33, but otherwise the rest of his electrolytes are appropriate.  The patient's urinalysis shows no clear evidence of infection.      The patient underwent a head CT which shows no acute pathology, bleed, mass or acute infarcts.      EMERGENCY DEPARTMENT COURSE AND MEDICAL DECISION MAKING:  Nursing notes were reviewed and agreed with.  The patient did not require any medications while in the department.      Mr. Diaz presents to us with continued hallucinations after an evaluation yesterday.  Because of this, I do feel he will need to be admitted to the hospital.  He is clearly unable to stay by himself due to the strong believability of these hallucinations to him.  I spoke with his daughter who concurs that he needs to be admitted.  Furthermore, he would benefit from being seen by Orthopedics to see if there is anything more we can do for this left hip.  I do believe his symptoms are all likely secondary to oxycodone use.  He has been off the Bactrim a sufficient time and shows no other signs of infection.  The patient is comfortable with this as is Dr. Ovalle of the Hospitalist Service who admit him under observation status.  Family questions were answered and they are comfortable with the plan for admission as well.      DIAGNOSES:   1.  Hallucinations.   2.  Medication reaction, initial encounter -- possible.         CHAD A. TRIERWEILER, MD             D: 05/31/2017  18:01   T: 2017 23:25   MT: [id number]      Name:     EMILIA ALMARAZ   MRN:      [id number]        Account:      [id number]   :      1940           Visit Date:   2017      Document: [id number]

## 2017-06-01 NOTE — H&P
DATE OF SERVICE:  05/31/2017       PRIMARY CARE PHYSICIAN:  Dr. Jerrod Graves       CHIEF COMPLAINT:  Hallucinations.      HISTORY OF PRESENT ILLNESS:  Deion Diaz is a 77-year-old male with a past medical history of CKD, COPD, hypertension, hyperlipidemia, BPH and persistent recurrent UTIs, lower extremity edema, ulcerations who presents to the Emergency Department with hallucinations.  The patient was seen in the emergency department yesterday and had a full evaluation and was ultimately discharged home.  The patient lives at home without assistance.  He has been worsening, especially in the context of his left hip pain and progression.  He has been evaluated by [facility] and while there has been some discussion for possible total hip, he has high risk for surgery and has not been instituted.  In its place, he has been on Vicodin 1-2 daily p.r.n.  Approximately 1 week ago he was suspected to have a diagnosed UTI and placed on Bactrim and concurrently transitioned from Vicodin and oxycodone.  Since this time, he has had notable worsening of hallucinations.  He is seeing things that clearly are not there.  Otherwise, he does not have fevers, nausea, vomiting, headache, trauma, dysuria, hematuria, urinary frequency.  Urine cultures have since returned negative for UTI.  He was taken off of his Bactrim yesterday.  He still continues to take oxycodone.  He returns again today with continued hallucinations.      In the Emergency Department, between separate evaluations, he has had a negative head CT and his laboratories have returned all generally within normal limits.  Given failure to thrive, hallucinations and unsafe for returning home, the patient is coming in under observation for further evaluation and care.      PAST MEDICAL HISTORY:   1.  COPD.   2.  Osteoarthritis of multiple joints.   3.  BPH with prior history of chronic Figueroa catheterization.   4.  Restless legs syndrome.   5.  Recurrent UTIs.   6.  Cataracts.    7.  Chronic kidney disease, stage 3.   8.  Obstructive sleep apnea.   9.  Hypertension.   10.  Hyperlipidemia.   11.  Coronary artery disease, status post PCI.      PAST SURGICAL HISTORY:     1.  Cervical decompression in 2005.   2.  Lumbar decompression in 2005.   3.  Laser TURP.   4.  Single vessel coronary stent in 2013.   5.  Tonsillectomy.      PRIOR TO ADMIT MEDICATIONS:   1.  Wellbutrin  mg b.i.d.   2.  Finasteride 5 mg q. day.   3.  Oxycodone 5 mg q.6 h. p.r.n. pain.   4.  Tamsulosin 0.4 mg each day at bedtime.   5.  Lasix 20 mg q.a.m.   6.  Multivitamin.   7.  Advair 500/50 one puff b.i.d.   8.  Albuterol 2 puffs q.6 h.   9.  Allopurinol 200 mg q. day.   10.  Atenolol 50 mg daily.   11.  Atorvastatin 20 mg q. day.   12.  Gabapentin 300 mg each day at bedtime.      ALLERGIES:  No known drug allergies.      SOCIAL HISTORY:  Former smoker, having quit in 2010 after smoking for 25 years.  A drink daily.  Daughter is at the bedside.      FAMILY HISTORY:  Reviewed and noncontributory.      REVIEW OF SYSTEMS:  Ten-point review of systems obtained with pertinent positives and negatives per HPI, otherwise negative.      PHYSICAL EXAMINATION:   VITAL SIGNS:  Blood pressure 119/77, sats 95%, temperature is 97.8.   CONSTITUTIONAL:  The patient is sitting on the side of the bed.  He is alert and oriented when asking specific questions.  He did have hallucination of a dog on the floor when I walked in the room.   HEENT:  Atraumatic.  Oropharynx is clear.   PULMONARY:  Clear to auscultation bilateral.  No wheezes, rhonchi or rales.   CARDIAC:  Normal S1, S2, regular rate and rhythm.   ABDOMEN:  Soft, nontender, nondistended, obese.   MUSCULOSKELETAL:  Positive 2-3+ lower extremity edema.   NEUROLOGIC:  Cranial nerves II-XII grossly intact.  GCS is 15.  His strength appears generally intact bilaterally.  He is answering questions.   SKIN:  Positive bilateral lower extremity superficial wounds that appear chronic.   There is some surrounding erythema but not necessarily infectious that I can tell.  He does have lower extremity edema.   PSYCHIATRIC:  Generally appropriate affect.  Note hallucination observation as above.      LABORATORY DATA:  Sodium 137, potassium 4.0, BUN 29, creatinine is 1.33.  WBC is 10.5, hemoglobin 14.4, platelets are 227,000.  Urinalysis:  White blood cells 0-5, head CT, no acute pathology, bleed or mass.      ASSESSMENT AND PLAN:  The patient is a 77-year-old male with a complicated medical history including chronic obstructive pulmonary disease, chronic kidney disease, coronary artery disease  status post PCI, severe left hip osteoarthritis, hypertension, BPH, obstructive sleep apnea, chronic lower extremity edema who presents to the Emergency Department with hallucinations.     1.  Hallucinations:  Suspect secondary to medication use.  Concurrent initiation of Bactrim and upgrading from Vicodin to oxycodone.  Bactrim already discontinued.  Continue with hallucinations.  The patient also failure to thrive.  Will bring in under observation overnight.  Hold these medications.  Have Psychiatry evaluate and see if there is any proactive medications to help with hallucinations.     2.  Worsening left hip pain:  No recent trauma.  Clearly this is the driving force behind his narcotic use as well as with difficulty even raising his legs for treating his lower extremity edema.  Will hold discharge oxycodone.  Schedule Tylenol.  We will ask Ortho if there are any nonnarcotic approaches to this pain.  Their input is greatly appreciated.     3.  Coronary artery disease:  No chest pain.  Will continue on prior to admit atenolol.  Will hold prior to admit atorvastatin given observation status.     4.  Lower extremity edema with superficial wounds:  Will have Wound Care see evaluate these wounds to see if there is anything further we can do.  We will try to elevate legs as much as possible.  Continue prior to admit  Lasix.     5.  BPH:  Continue on prior to admit finasteride and tamsulosin.     6.  Chronic obstructive pulmonary disease:  Breathing at baseline.  Continue on prior to admit Advair and p.r.n. albuterol.     7.  Fluids, electrolytes and nutrition:  Electrolytes otherwise stable.  Given lower extremity edema.  We will hold on fluids.  Recheck labs again in the morning.     8.  Code status:  I talked to the daughter, she wanted him to be full code.  I do believe in Care Everywhere, it said was a POLST for DNR/DNI.  This will have to be sorted out tomorrow.  In the meantime, we will make full code.     9.  Disposition:  Observation status.         MUSTAPHA HUSSEIN MD             D: 2017 19:19   T: 2017 22:16   MT: [id number]      Name:     EMILIA ALMARAZ   MRN:      [id number]        Account:      [id number]   :      1940           Admitted:     [id number]      Document: [id number]       cc: Jerrod Graves MD

## 2017-06-01 NOTE — PROGRESS NOTES
[facility] Nurse Inpatient Wound Assessment     Initial Assessment of wound(s) on pt's:   Bilateral lower legs        Data:   Patient History:      per MD note(s): 77-year-old male with a past medical history of CKD, COPD, hypertension, hyperlipidemia, BPH and persistent recurrent UTIs, lower extremity edema, ulcerations who presents to the Emergency Department with hallucinations.  The patient was seen in the emergency department yesterday and had a full evaluation and was ultimately discharged home.  The patient lives at home without assistance.  He has been worsening, especially in the context of his left hip pain and progression.  He has been evaluated by [facility] and while there has been some discussion for possible total hip, he has high risk for surgery and has not been instituted.  In its place, he has been on Vicodin 1-2 daily p.r.n.  Approximately 1 week ago he was suspected to have a diagnosed UTI and placed on Bactrim and concurrently transitioned from Vicodin and oxycodone.  Since this time, he has had notable worsening of hallucinations.  He is seeing things that clearly are not there.  Otherwise, he does not have fevers, nausea, vomiting, headache, trauma, dysuria, hematuria, urinary frequency.  Urine cultures have since returned negative for UTI.  He was taken off of his Bactrim yesterday.  He still continues to take oxycodone.  He returns again today with continued hallucinations.       In the Emergency Department, between separate evaluations, he has had a negative head CT and his laboratories have returned all generally within normal limits.  Given failure to thrive, hallucinations and unsafe for returning home, the patient is coming in under observation for further evaluation and care.      Moisture Management:  Incontinence Protocol    Catheter secured? Not applicable    Current Diet / Nutrition:           Active Diet Order      Regular Diet Adult               Ricki  Assessment and sub scores:   Ricki Score  Av  Min: 14  Max: 14     Labs:         Recent Labs   Lab Test  17   2154   HGB  13.0*   RBC  4.28*   WBC  10.8   PLT  287          Wound Assessment (location):   BL lower extremities  Wound History:  Pt reports he constantly has wounds to both legs from him bumping into things at home while in wheel chair.    Wound Base: Multiple tan/yellow/brown superfical, non fluctuant healing scabs, dry, non draining     Specific Dimensions (length x width x depth, in cm) :   L shin 3 x 1.5 x 0cm                                                                                            R shin upper 2.5 x 2 x 0cm                                                                                            R shin middle 4 x 3.5 x 0cm                                                                                            R shin lower 3.5 x 1.5 x 0cm    Palpation of the wound bed:  normal    Slough appearance:  Dry serous scabbing    Periwound Skin: intact epidermis, 1 plus edema    Color: multple areas of eccymosis    Temperature  cool    Drainage:  none     Odor: none    Pain:  absent          Intervention:     Patient's chart evaluated.      Wound(s) Assessed at bedside with nurse    Orders  Written    Supplies  Reviewed    Discussed plan of care with Patient and Nurse          Assessment:       Pt has multiple trauma wounds to BL lower extremities. Wounds are currently stable, non draining and no acute signs or symptoms of infection.  Plan is to keep areas clean and dry and keep skin well moisturized with sween 24.         Plan:     Nursing to notify the Provider(s) and re-consult the [facility] Nurse if wound(s) deteriorate(s) or if the wound care plan needs reevaluation.    Plan of care for wound located on  lower extremities: Cleanse BL legs with water and gentle soap, pat dry and apply thin layer of sween 24 to BL feet and legs.     WOC Nurse consult complete, please re  consult with any changes or skin concerns.     Reviewed by Love ONEILL     Face to face time: 15 Minutes

## 2017-06-01 NOTE — CONSULTS
[facility]    Orthopedic Surgery Consultation    Date of Admission:  5/31/2017  Date of Consult (When I saw the patient): 06/01/17    Assessment & Plan     Assessment:  Severe osteoarthritis of left hip.    Plan:  He is planning on getting his follow-up care at [facility].  He was admitted here because of hallucinations which appears to be unrelated to either lack of sleep or new medications.  I talked with his daughter and Mackenzie..  He has been followed by a doctor there who is working on getting him medically cleared for a left total hip replacement.  The surgeons goal was to get his wounds cleared up off of his legs and make sure that his urinary tract infections were cleared.  His daughter is hoping to have him transferred to some type of assisted care facility where he is less likely to fall and can eventually get his hip replaced.  She is working with the  at this time.  I talked with her about getting a intra-articular hip injection on the left.  His last one was in August of last year.  He has been trying to avoid the injections because it would delay his hip replacement.    At this time we really didn't have anything more to offer.  Hospitalist will work with him on pain management for discharge and I will sign off at this time.          Deion Ospina MD    Code Status    Full Code    Reason for Consult   Reason for consult: evaluate left hip pain    Primary Care Physician   Jerrod Graves    Chief Complaint   Left Hip Pain    History of Present Illness   History is obtained from the patient and the daughter    Deion Diaz is a 77 year old male who  was admitted to the hospital because of hallucinations and some recent falls at home.  He has known severe osteoarthritis of his left hip.  He was recently changed from Vicodin to oxycodone.  He was also on Valium and Bactrim for a urinary tract infection.    I was asked to see the patient to see if there were any other options  for treatment of his left hip pain other than narcotics.    He has been followed at [facility] for his left hip.  His surgeon is Dr. Garsia.  Surgery for his hip has been delayed because of urinary tract infection and some open sores on his legs.  He has a history of some congestive heart failure.  His only comfortable position is sitting.  Because of sitting he has dependent edema in both lower extremities.  This has caused some swelling and skin breakdown.    He has had hip injections last one was in August 2016 or in the fall of 2060.     Past Medical History   I have reviewed this patient's medical history and updated it with pertinent information if needed.   Past Medical History:   Diagnosis Date     Chronic kidney disease      Closed left hip fracture (H)      Emphysema of lung (H)        Past Surgical History   I have reviewed this patient's surgical history and updated it with pertinent information if needed.  Past Surgical History:   Procedure Laterality Date     BACK SURGERY      neck, back      CARDIAC SURGERY      stent X1        Prior to Admission Medications   Prior to Admission Medications   Prescriptions Last Dose Informant Patient Reported? Taking?   ALLOPURINOL PO 5/31/2017 at am Daughter Yes Yes   Sig: Take 100 mg by mouth daily    ATENOLOL PO 5/31/2017 at am Daughter Yes Yes   Sig: Take 50 mg by mouth daily    ATORVASTATIN CALCIUM PO  Daughter Yes No   Sig: Take 20 mg by mouth daily    Ca Carbonate-Mag Hydroxide (ROLAIDS PO) prn Daughter Yes Yes   Sig: Take by mouth as needed   FINASTERIDE PO 5/31/2017 at am Daughter Yes Yes   Sig: Take 5 mg by mouth daily   FUROSEMIDE PO 5/31/2017 at am Daughter Yes Yes   Sig: Take 20 mg by mouth daily    GABAPENTIN PO 5/30/2017 at hs Daughter Yes Yes   Sig: Take 300 mg by mouth At Bedtime    NITROGLYCERIN SL prn Daughter Yes Yes   Sig: Place 0.4 mg under the tongue   NONFORMULARY 5/31/2017 at am Daughter Yes Yes   Sig: Hydrocortisone 2.5% cream mixed 1:1 with  Vanicream apply BID from neck down to affected area   OXYCODONE HCL PO prn Daughter Yes Yes   Sig: Take 5 mg by mouth every 6 hours as needed   TAMSULOSIN HCL PO 5/30/2017 at hs Daughter Yes Yes   Sig: Take 0.4 mg by mouth At Bedtime   albuterol (PROAIR HFA, PROVENTIL HFA, VENTOLIN HFA) 108 (90 BASE) MCG/ACT inhaler prn Daughter Yes Yes   Sig: Inhale 2 puffs into the lungs every 6 hours as needed    buPROPion (WELLBUTRIN SR) 150 MG 12 hr tablet 5/31/2017 at am Daughter Yes Yes   Sig: Take 150 mg by mouth 2 times daily   fluticasone-salmeterol (ADVAIR) 500-50 MCG/DOSE diskus inhaler 5/31/2017 at am Daughter Yes Yes   Sig: Inhale 1 puff into the lungs every 12 hours   multivitamin, therapeutic with minerals (MULTI-VITAMIN) TABS 5/31/2017 at am Daughter Yes Yes   Sig: Take 1 tablet by mouth daily      Facility-Administered Medications: None     Allergies   No Known Allergies    Social History   I have reviewed this patient's social history and updated it with pertinent information if needed. Deion ADEN Emily  reports that he quit smoking about 17 years ago. He has a 60.00 pack-year smoking history. He has never used smokeless tobacco. He reports that he drinks alcohol. He reports that he does not use illicit drugs.    Family History   I have reviewed this patient's family history and updated it with pertinent information if needed.   No family history on file.    Review of Systems   The 5 point Review of Systems is negative other than noted in the HPI and under past medical and surgical history.    Physical Exam   Temp: 97.4  F (36.3  C) Temp src: Oral BP: 133/74 Pulse: 82 Heart Rate: 76 Resp: 20 SpO2: 92 % O2 Device: None (Room air)    Vital Signs with Ranges  Temp:  [97.4  F (36.3  C)-98  F (36.7  C)] 97.4  F (36.3  C)  Pulse:  [82] 82  Heart Rate:  [76-89] 76  Resp:  [20] 20  BP: (117-136)/(64-92) 133/74  SpO2:  [91 %-95 %] 92 %  0 lbs 0 oz    Constitutional: Alert , Co-opertive, laying in bed, prefers to lay on his  left side.  SkiIntact over the left hip.  He has numerous areas of small opens areas on both lower extremities most are approximately 2 cm in diameter.  No draining wounds.  Motor:Grossly intact  Sensory: Intact  Circulation: Bilateral lower extremity edema    he has very little range of motion of his left hip.  There is crepitus side and extend his hip.  He has essentially no internal or external rotation.  I cannot extend his hip into a neutral position.      Data   Results for orders placed or performed during the hospital encounter of 05/31/17 (from the past 24 hour(s))   CBC with platelets differential   Result Value Ref Range    WBC 10.5 4.0 - 11.0 10e9/L    RBC Count 4.75 4.4 - 5.9 10e12/L    Hemoglobin 14.4 13.3 - 17.7 g/dL    Hematocrit 43.2 40.0 - 53.0 %    MCV 91 78 - 100 fl    MCH 30.3 26.5 - 33.0 pg    MCHC 33.3 31.5 - 36.5 g/dL    RDW 13.3 10.0 - 15.0 %    Platelet Count 327 150 - 450 10e9/L    Diff Method Automated Method     % Neutrophils 76.2 %    % Lymphocytes 11.1 %    % Monocytes 11.2 %    % Eosinophils 1.0 %    % Basophils 0.2 %    % Immature Granulocytes 0.3 %    Nucleated RBCs 0 0 /100    Absolute Neutrophil 8.0 1.6 - 8.3 10e9/L    Absolute Lymphocytes 1.2 0.8 - 5.3 10e9/L    Absolute Monocytes 1.2 0.0 - 1.3 10e9/L    Absolute Eosinophils 0.1 0.0 - 0.7 10e9/L    Absolute Basophils 0.0 0.0 - 0.2 10e9/L    Abs Immature Granulocytes 0.0 0 - 0.4 10e9/L    Absolute Nucleated RBC 0.0    Basic metabolic panel   Result Value Ref Range    Sodium 137 133 - 144 mmol/L    Potassium 4.0 3.4 - 5.3 mmol/L    Chloride 102 94 - 109 mmol/L    Carbon Dioxide 27 20 - 32 mmol/L    Anion Gap 8 3 - 14 mmol/L    Glucose 86 70 - 99 mg/dL    Urea Nitrogen 21 7 - 30 mg/dL    Creatinine 1.33 (H) 0.66 - 1.25 mg/dL    GFR Estimate 52 (L) >60 mL/min/1.7m2    GFR Estimate If Black 63 >60 mL/min/1.7m2    Calcium 9.7 8.5 - 10.1 mg/dL   *UA reflex to Microscopic   Result Value Ref Range    Color Urine Yellow     Appearance  Urine Clear     Glucose Urine Negative NEG mg/dL    Bilirubin Urine Negative NEG    Ketones Urine 15 (A) NEG mg/dL    Specific Gravity Urine 1.015 1.003 - 1.035    Blood Urine Negative NEG    pH Urine 6.0 5.0 - 7.0 pH    Protein Albumin Urine Negative NEG mg/dL    Urobilinogen Urine 0.2 0.2 - 1.0 EU/dL    Nitrite Urine Negative NEG    Leukocyte Esterase Urine Trace (A) NEG    Source Midstream Urine    Urine Microscopic   Result Value Ref Range    WBC Urine 2-5 (A) 0 - 2 /HPF    RBC Urine O - 2 0 - 2 /HPF    Bacteria Urine Few (A) NEG /HPF   Head CT w/o contrast    Narrative    CT HEAD W/O CONTRAST   5/31/2017 5:18 PM     HISTORY: confusion    TECHNIQUE:  Axial images of the head without IV contrast material.  Radiation dose for this scan was reduced using automated exposure  control, adjustment of the mA and/or kV according to patient size, or  iterative reconstruction technique.    COMPARISON: None.    FINDINGS: The ventricles are normal in size, shape and configuration.  The brain parenchyma and subarachnoid spaces are normal. There is no  evidence of intracranial hemorrhage, mass, acute infarct or anomaly.  The visualized portions of the sinuses and mastoids appear normal.  There is no evidence of trauma.      Impression    IMPRESSION:  No acute pathology, no bleed, mass, or acute infarcts are  seen.    JOSIAH PARTIDA MD   Care Coordinator IP Consult    Narrative    Martha Negrete RN     6/1/2017  1:04 PM  Care Transition Initial Assessment - RN    Met with: Patient and his Daughter Mackenzie SANTIAGO   Active Problems:    Failure to thrive in adult       Cognitive Status: awake, alert and oriented.        Contact information and PCP information verified: Yes    Lives With: alone  Living Arrangements: condominium    Insurance concerns: Pt is Observation status, will not have   coverage for TCU    ASSESSMENT  Patient currently receives the following services:  Pt has a   person he hires to help with cleaning, cooking and  "mayteands     Identified issues/concerns regarding health management: Pt has   osteoarthritis with pretty severe left hip pain to the point he   no longer is able to walk.  He has a motorized scooter.  He tends   to sit on the edge of his bed in an attempt to relieve his pain   and then falls asleep in the sitting position and then often   slips off the side of the bed onto floor.  His hallucinations have resolved.  Dr Ospina has seen pt and   imaging is pending.  Discussed rehab with pt.  He is aware that this will be private   pay.  He shared that he knows he needs to move into an assisted   living and has been looking at facilities that would accept MA   once his \"money runs out\".  He is not totally against the idea of   transitioning to a TCU.  He will think more about this.  His son   and daughter live close to him.  There is no family that is able   to stay with him.  SW will see him  PLAN  Financial costs for the patient include Observation status  Patient given options and choices for discharge: PT is   recommending TCU.  Pt understands that TCU will be private pay.    Pt's Daughter Mackenzie would like referral into [facility].    She lives four blocks from this facility.   Patient/family is agreeable to the plan? Mackenzie felt that pt   transitioning home is not an option due to his falling.  She will   have a discussion with pt concerning need for TCU with transition   to an [facility]    Discharge Planner   Discharge Plans in progress:SW will place referral to [facility]  Barriers to discharge plan: Private pay, pain control  Follow up plan: Anticipate discharge to a TCU tomorrow pending   imaging and assessment    Care  (CTS) will continue to follow as   needed.     Orthopedics IP Consult: Patient to be seen: Routine - within 24 hours; Left hip pain that driving his narcotic pain medication and inability to raise legs while in bed.  ? Any non-narcotic options here ?; Consultant may enter " orders: Yes    Narrative    Deion Ospina MD     6/1/2017  5:44 AM  Orthopedic Consult received   Chart reviewed  Xrays of pelvis and hips ordered    Formal consult to follow  Deion Ospina MD   Psychiatry IP Consult: Hallucintations; Consultant may enter orders: Yes; Patient to be seen: Routine; Call back #: Cover PA for the obs unit    Narrative    Campos Sage MD     6/1/2017  8:03 AM  Pt seen for new psychiatric consultation, see my dictation.    Campos Sage MD    CBC with platelets   Result Value Ref Range    WBC 10.8 4.0 - 11.0 10e9/L    RBC Count 4.28 (L) 4.4 - 5.9 10e12/L    Hemoglobin 13.0 (L) 13.3 - 17.7 g/dL    Hematocrit 38.6 (L) 40.0 - 53.0 %    MCV 90 78 - 100 fl    MCH 30.4 26.5 - 33.0 pg    MCHC 33.7 31.5 - 36.5 g/dL    RDW 13.3 10.0 - 15.0 %    Platelet Count 287 150 - 450 10e9/L   Basic metabolic panel   Result Value Ref Range    Sodium 139 133 - 144 mmol/L    Potassium 3.9 3.4 - 5.3 mmol/L    Chloride 104 94 - 109 mmol/L    Carbon Dioxide 25 20 - 32 mmol/L    Anion Gap 10 3 - 14 mmol/L    Glucose 93 70 - 99 mg/dL    Urea Nitrogen 21 7 - 30 mg/dL    Creatinine 1.32 (H) 0.66 - 1.25 mg/dL    GFR Estimate 52 (L) >60 mL/min/1.7m2    GFR Estimate If Black 63 >60 mL/min/1.7m2    Calcium 9.2 8.5 - 10.1 mg/dL   XR Pelvis and Hip Bilateral 2 Views    Narrative    XR PELVIS AND HIP BILATERAL 2 VIEWS 6/1/2017 10:25 AM    COMPARISON: None.    HISTORY: Left hip pain.      Impression    IMPRESSION: Severe left hip osteoarthritis with complete loss of joint  space and significant femoral head remodeling. No definite fractures  are seen. Mild degenerative changes in the right hip. Partially imaged  lumbar spinal fusion hardware.    KARRIE SALAZAR       Imaging:Severe osteoarthritis left hip rests focal areas of osteonecrosis in the femoral head I did not see any signs of previous fracture.

## 2017-06-01 NOTE — CONSULTS
Orthopedic Consult received   Chart reviewed  Xrays of pelvis and hips ordered    Formal consult to follow  Deion Ospina MD

## 2017-06-01 NOTE — PROGRESS NOTES
[facility]    Hospitalist Progress Note    Date of Service (when I saw the patient): 06/01/2017    Assessment & Plan   The patient is a 77-year-old male with a complicated medical history including chronic obstructive pulmonary disease, chronic kidney disease, coronary artery disease  status post PCI, severe left hip osteoarthritis, hypertension, BPH, obstructive sleep apnea, chronic lower extremity edema who presents to the Emergency Department with hallucinations.     1. Hallucinations likely secondary to narcotic use and sleep deprived.  Concurrent initiation of Bactrim and upgrading from Vicodin to oxycodone.  Bactrim already discontinued.  CT head without acute findings.   - avoid delirium causing medications as much as possible  - Psych consult appreciated, trial seroquel and changed wellbutrin to XL  - scheduled melatonin for sleep cycle regulation    2.  Worsening left hip pain:  No recent trauma.  Clearly this is the driving force behind his narcotic use as well as with difficulty even raising his legs for treating his lower extremity edema.    - No more oxycodone.    - c/w scheduled Tylenol.    - cautious use of vicodin as he tolerated this in the past  - Orthopedics consult appreciated, XR hip without acute fractures  - trial lidoderm patch at night  - PTA gabapentin at night     3.  Coronary artery disease:  No chest pain.    - PTA atenolol.  - hold prior to admit atorvastatin given observation status.     4.  Lower extremity edema with superficial wounds:    - Appreciate Wound Care consult  - We will try to elevate legs as much as possible.   - PTA Lasix.     5.  BPH:  PTA finasteride and tamsulosin.     6.  Chronic obstructive pulmonary disease:  Breathing at baseline.    - PTA Advair and p.r.n. albuterol.     DVT Prophylaxis: Pneumatic Compression Devices  Code Status: Full Code    Disposition: Expected discharge in 1 day once hallucinations resolve and pain controlled.    Ciera  MD Garrick    Interval History   Initially seen in the morning, returned late morning when daughter and brother arrived. Patient states no new complaints. Sitting up in chair. Family feel he is much improved and appears to be hallucinating less, though patient admits to not knowing if I am real or not. Hallucinations only visual. Denies fevers/chills. No burning on urination, but admits to increase frequency. Daughter states he is in the process of being evaluated for hip surgery however he keeps falling and due to wounds/UTI's cannot go for surgery. She feels he is falling due to poor pain control, resulting in him sleeping only when sitting upright as he cannot lay flat with his hip pain.    -Data reviewed today: I reviewed all new labs and imaging results over the last 24 hours. I personally reviewed no images or EKG's today.    Physical Exam   Temp: 97.4  F (36.3  C) Temp src: Oral BP: 133/74 Pulse: 82 Heart Rate: 76 Resp: 20 SpO2: 92 % O2 Device: None (Room air)    There were no vitals filed for this visit.  Vital Signs with Ranges  Temp:  [97.4  F (36.3  C)-98  F (36.7  C)] 97.4  F (36.3  C)  Pulse:  [82] 82  Heart Rate:  [76-89] 76  Resp:  [20] 20  BP: (117-136)/(64-92) 133/74  SpO2:  [91 %-95 %] 92 %  I/O last 3 completed shifts:  In: -   Out: 100 [Urine:100]    Constitutional: Awake, alert, cooperative, no apparent distress  Respiratory: Clear to auscultation bilaterally, no crackles or wheezing  Cardiovascular: Regular rate and rhythm, normal S1 and S2, and no murmur noted  GI: Normal bowel sounds, soft, non-distended, non-tender  Skin/Integumen: No rashes, no cyanosis, 2 areas of abrasion noted on pretibial areas b/l, no drainage. Pitting edema up to knees b/l.    Medications        QUEtiapine  25 mg Oral At Bedtime     [START ON 6/2/2017] buPROPion  150 mg Oral Daily     lidocaine  1 patch Transdermal Q24h    And     [START ON 6/2/2017] lidocaine   Transdermal Q24H    And     lidocaine   Transdermal  Q8H     melatonin  3 mg Oral At Bedtime     sodium chloride (PF)  3 mL Intracatheter Q8H     allopurinol (ZYLOPRIM) tablet 100 mg  100 mg Oral Daily     atenolol (TENORMIN) tablet 50 mg  50 mg Oral Daily     fluticasone-salmeterol  1 puff Inhalation Q12H     furosemide (LASIX) tablet 20 mg  20 mg Oral Daily     gabapentin (NEURONTIN) capsule 300 mg  300 mg Oral At Bedtime     tamsulosin (FLOMAX) capsule 0.4 mg  0.4 mg Oral At Bedtime     acetaminophen  650 mg Oral TID       Data     Recent Labs  Lab 06/01/17  0744 05/31/17  2154 05/31/17  1620 05/30/17  1953   WBC  --  10.8 10.5  --    HGB  --  13.0* 14.4  --    MCV  --  90 91  --    PLT  --  287 327  --      --  137 137   POTASSIUM 3.9  --  4.0 3.9   CHLORIDE 104  --  102 102   CO2 25  --  27 25   BUN 21  --  21 21   CR 1.32*  --  1.33* 1.22   ANIONGAP 10  --  8 10   LOU 9.2  --  9.7 9.1   GLC 93  --  86 90       Recent Results (from the past 24 hour(s))   Head CT w/o contrast    Narrative    CT HEAD W/O CONTRAST   5/31/2017 5:18 PM     HISTORY: confusion    TECHNIQUE:  Axial images of the head without IV contrast material.  Radiation dose for this scan was reduced using automated exposure  control, adjustment of the mA and/or kV according to patient size, or  iterative reconstruction technique.    COMPARISON: None.    FINDINGS: The ventricles are normal in size, shape and configuration.  The brain parenchyma and subarachnoid spaces are normal. There is no  evidence of intracranial hemorrhage, mass, acute infarct or anomaly.  The visualized portions of the sinuses and mastoids appear normal.  There is no evidence of trauma.      Impression    IMPRESSION:  No acute pathology, no bleed, mass, or acute infarcts are  seen.    JOSIAH PARTIDA MD   XR Pelvis and Hip Bilateral 2 Views    Narrative    XR PELVIS AND HIP BILATERAL 2 VIEWS 6/1/2017 10:25 AM    COMPARISON: None.    HISTORY: Left hip pain.      Impression    IMPRESSION: Severe left hip osteoarthritis with  complete loss of joint  space and significant femoral head remodeling. No definite fractures  are seen. Mild degenerative changes in the right hip. Partially imaged  lumbar spinal fusion hardware.    KARRIE SALAZAR

## 2017-06-01 NOTE — PROGRESS NOTES
xcover    Pain uncontrolled.  Will go ahead and order Norco (he had tolerated hydrocodone before transitioning to oxy).    Will have PharmD review if px was also on Valium as family claims, not on original list.  Would also contribute to possibility of hallucinations.    Jay Ovalle MD  [facility] Hospitalist   Pager: [phone]

## 2017-06-02 PROBLEM — F19.951: Status: ACTIVE | Noted: 2017-06-02

## 2017-06-02 PROCEDURE — 40000275 ZZH STATISTIC RCP TIME EA 10 MIN

## 2017-06-02 PROCEDURE — 96372 THER/PROPH/DIAG INJ SC/IM: CPT

## 2017-06-02 PROCEDURE — G0378 HOSPITAL OBSERVATION PER HR: HCPCS

## 2017-06-02 PROCEDURE — A9270 NON-COVERED ITEM OR SERVICE: HCPCS | Mod: GY | Performed by: HOSPITALIST

## 2017-06-02 PROCEDURE — 93005 ELECTROCARDIOGRAM TRACING: CPT

## 2017-06-02 PROCEDURE — 25000132 ZZH RX MED GY IP 250 OP 250 PS 637: Mod: GY | Performed by: HOSPITALIST

## 2017-06-02 PROCEDURE — 40000916 ZZH STATISTIC SITTER, NIGHT HOURS

## 2017-06-02 PROCEDURE — 12000000 ZZH R&B MED SURG/OB

## 2017-06-02 PROCEDURE — 25000128 H RX IP 250 OP 636

## 2017-06-02 PROCEDURE — 25000132 ZZH RX MED GY IP 250 OP 250 PS 637: Mod: GY | Performed by: INTERNAL MEDICINE

## 2017-06-02 PROCEDURE — 93010 ELECTROCARDIOGRAM REPORT: CPT | Performed by: INTERNAL MEDICINE

## 2017-06-02 PROCEDURE — 25000132 ZZH RX MED GY IP 250 OP 250 PS 637: Mod: GY | Performed by: PSYCHIATRY & NEUROLOGY

## 2017-06-02 PROCEDURE — A9270 NON-COVERED ITEM OR SERVICE: HCPCS | Mod: GY | Performed by: INTERNAL MEDICINE

## 2017-06-02 PROCEDURE — 99232 SBSQ HOSP IP/OBS MODERATE 35: CPT | Performed by: PSYCHIATRY & NEUROLOGY

## 2017-06-02 PROCEDURE — 99232 SBSQ HOSP IP/OBS MODERATE 35: CPT | Performed by: INTERNAL MEDICINE

## 2017-06-02 PROCEDURE — A9270 NON-COVERED ITEM OR SERVICE: HCPCS | Mod: GY | Performed by: PSYCHIATRY & NEUROLOGY

## 2017-06-02 RX ORDER — FUROSEMIDE 40 MG
40 TABLET ORAL ONCE
Status: COMPLETED | OUTPATIENT
Start: 2017-06-02 | End: 2017-06-02

## 2017-06-02 RX ORDER — HALOPERIDOL 5 MG/ML
5 INJECTION INTRAMUSCULAR ONCE
Status: COMPLETED | OUTPATIENT
Start: 2017-06-02 | End: 2017-06-02

## 2017-06-02 RX ORDER — HALOPERIDOL 5 MG/ML
INJECTION INTRAMUSCULAR
Status: COMPLETED
Start: 2017-06-02 | End: 2017-06-02

## 2017-06-02 RX ADMIN — ACETAMINOPHEN 650 MG: 325 TABLET, FILM COATED ORAL at 16:21

## 2017-06-02 RX ADMIN — QUETIAPINE FUMARATE 25 MG: 25 TABLET, FILM COATED ORAL at 21:06

## 2017-06-02 RX ADMIN — HYDROCODONE BITARTRATE AND ACETAMINOPHEN 1 TABLET: 5; 325 TABLET ORAL at 10:58

## 2017-06-02 RX ADMIN — ACETAMINOPHEN 650 MG: 325 TABLET, FILM COATED ORAL at 21:06

## 2017-06-02 RX ADMIN — ATENOLOL 50 MG: 50 TABLET ORAL at 08:04

## 2017-06-02 RX ADMIN — TAMSULOSIN HYDROCHLORIDE 0.4 MG: 0.4 CAPSULE ORAL at 21:06

## 2017-06-02 RX ADMIN — HYDROCODONE BITARTRATE AND ACETAMINOPHEN 1 TABLET: 5; 325 TABLET ORAL at 06:26

## 2017-06-02 RX ADMIN — HALOPERIDOL 5 MG: 5 INJECTION INTRAMUSCULAR at 04:57

## 2017-06-02 RX ADMIN — MELATONIN 3 MG: 3 TAB ORAL at 21:06

## 2017-06-02 RX ADMIN — BUPROPION HYDROCHLORIDE 150 MG: 150 TABLET, FILM COATED, EXTENDED RELEASE ORAL at 08:04

## 2017-06-02 RX ADMIN — HALOPERIDOL LACTATE 5 MG: 5 INJECTION, SOLUTION INTRAMUSCULAR at 04:57

## 2017-06-02 RX ADMIN — FUROSEMIDE 20 MG: 20 TABLET ORAL at 08:04

## 2017-06-02 RX ADMIN — ACETAMINOPHEN 650 MG: 325 TABLET, FILM COATED ORAL at 08:04

## 2017-06-02 RX ADMIN — LIDOCAINE 1 PATCH: 50 PATCH TOPICAL at 21:06

## 2017-06-02 RX ADMIN — GABAPENTIN 300 MG: 300 CAPSULE ORAL at 21:06

## 2017-06-02 RX ADMIN — HYDROCODONE BITARTRATE AND ACETAMINOPHEN 1 TABLET: 5; 325 TABLET ORAL at 22:41

## 2017-06-02 RX ADMIN — HYDROCODONE BITARTRATE AND ACETAMINOPHEN 1 TABLET: 5; 325 TABLET ORAL at 00:01

## 2017-06-02 RX ADMIN — FUROSEMIDE 40 MG: 40 TABLET ORAL at 16:21

## 2017-06-02 RX ADMIN — ALLOPURINOL 100 MG: 100 TABLET ORAL at 08:06

## 2017-06-02 NOTE — PROGRESS NOTES
Restraints removed at 730 am.  Patient calm and cooperative.  Up in chair with 2 assistance.  A&Ox3-4, slightly disoriented to situation.  Denies hallucinations at this time.  Vitals stable.  Pain 4/10 scheduled tylenol given.  Continue to monitor.

## 2017-06-02 NOTE — PROGRESS NOTES
HOUSE MD NOTE  RE:  CODE 21    78yo male admitted with delirium. Code 21 called ar [id number]. Per RN staff patient with escalating verbal hallucinations and throwing phones at the wall. Refusing oral seroquel. Pulled out IV. Insensical speech upon evaluation. Patient sitting in chair. No ECG on file. Successfully transferred to hospital bed. Given haldol IM x1. Soft restraints placed.        MD Denny Epps Physician  Time Spent: 15 mins

## 2017-06-02 NOTE — PROGRESS NOTES
SUNNY  D:  [facility] evening and that we are anticipating d/c on Saturday if patient remains stable over night.  [facility] will have a bed on their standard TCU and their cognitive impaired TCU.  Their admission staff will review EPIC notes tomorrow and determine which unit they prefer to admit patient to.  Patient requested to see  this afternoon. When writer entered the room, he was sleeping and daughter requested he be allowed to sleep.  P:  Patient's d/c plan still needs to be finalized.  As of yesterday afternoon, patient was not wanting to go to a TCU and use his finances.  He preferred to go home and hire help.

## 2017-06-02 NOTE — PROGRESS NOTES
Responded to chair alarm in patient's room. Pt noted to be disoriented and agitated. Pt attempting to get up by himself. Attempted to assist patient to bed, patient attempting to hit at staff. Security called, pt threw room phone at wall. Code 21 called.

## 2017-06-02 NOTE — CONSULTS
[facility] Psychiatric Consult Progress Note      Interval History:   Pt seen, care reviewed with treatment team. He was evaluated in the presence of his daughter with his permission. She reports that he has been taking 2 Vicodin tabs every night for the last 10 years on account of his hip pain. She reports that it was initially though that his antibiotics were making him loopy and so he was taken off them before he was admitted. She says she cannot imagine her father not taking Vicodin before bed. The deleterious effects of long term narcotic use were highlighted. Patient admits he was experiencing visual hallucinations last night. He reports he just was trying to communicate that he needed his medication for him to sleep. He apologized for his behavior as he reports aware of his belligerence. He is lucid at this time and hoping for discharge tomorrow.   Review of systems:   The Review of Systems is negative other than noted in the HPI     Medications:       QUEtiapine  25 mg Oral At Bedtime     buPROPion  150 mg Oral Daily     lidocaine  1 patch Transdermal Q24h    And     lidocaine   Transdermal Q24H    And     lidocaine   Transdermal Q8H     melatonin  3 mg Oral At Bedtime     sodium chloride (PF)  3 mL Intracatheter Q8H     allopurinol (ZYLOPRIM) tablet 100 mg  100 mg Oral Daily     atenolol (TENORMIN) tablet 50 mg  50 mg Oral Daily     fluticasone-salmeterol  1 puff Inhalation Q12H     furosemide (LASIX) tablet 20 mg  20 mg Oral Daily     gabapentin (NEURONTIN) capsule 300 mg  300 mg Oral At Bedtime     tamsulosin (FLOMAX) capsule 0.4 mg  0.4 mg Oral At Bedtime     acetaminophen  650 mg Oral TID     QUEtiapine, sodium chloride (PF), albuterol, lidocaine, lidocaine 4%, sodium chloride (PF), acetaminophen, acetaminophen, naloxone, senna-docusate, polyethylene glycol, bisacodyl, ondansetron **OR** ondansetron, HYDROcodone-acetaminophen      Mental Status Examination:     Appearance:  awake, alert and  appeared as age stated  Eye Contact:  fair  Speech:  clear, coherent  Psychomotor Behavior:  no evidence of tardive dyskinesia, dystonia, or tics  Mood:  better  Affect:  mood congruent  Thought Process:  logical and linear no loose associations  Thought Content:  no evidence of suicidal ideation or homicidal ideation and no evidence of psychotic thought  Oriented to:  person and place  Attention Span and Concentration:  limited  Recent and Remote Memory:  limited  Fund of Knowledge: low-normal  Muscle Strength and Tone: normal  Gait and Station: NA  Insight:  limited  Judgment:  limited        Labs/vitals:   No results found for this or any previous visit (from the past 24 hour(s)).  B/P: 130/67, T: 97.4, P: 79, R: 18    Impression:   The patient is a 77-year-old man presenting with a delirium which is likely multifactorial. Code 21 was called last night on account of his agitation and psychosis.      DIagnoses:     1.  Delirium, multifactorial.   2.  Recurrent urinary tract infection by history.   3.  Chronic hip pain.          Plan:   1. Written information given on medications. Side effects, risks, benefits reviewed.  2. Continue current medications as ordered as he is lucid and back on his pain medications.      Attestation:  Patient has been seen and evaluated by me,  Shereen Hernandez MD

## 2017-06-03 VITALS
SYSTOLIC BLOOD PRESSURE: 117 MMHG | RESPIRATION RATE: 18 BRPM | HEART RATE: 93 BPM | DIASTOLIC BLOOD PRESSURE: 59 MMHG | BODY MASS INDEX: 33.28 KG/M2 | TEMPERATURE: 98.3 F | OXYGEN SATURATION: 90 % | HEIGHT: 65 IN

## 2017-06-03 LAB
ANION GAP SERPL CALCULATED.3IONS-SCNC: 9 MMOL/L (ref 3–14)
BUN SERPL-MCNC: 30 MG/DL (ref 7–30)
CALCIUM SERPL-MCNC: 9.2 MG/DL (ref 8.5–10.1)
CHLORIDE SERPL-SCNC: 107 MMOL/L (ref 94–109)
CO2 SERPL-SCNC: 26 MMOL/L (ref 20–32)
CREAT SERPL-MCNC: 1.19 MG/DL (ref 0.66–1.25)
GFR SERPL CREATININE-BSD FRML MDRD: 59 ML/MIN/1.7M2
GLUCOSE SERPL-MCNC: 195 MG/DL (ref 70–99)
PHOSPHATE SERPL-MCNC: 3 MG/DL (ref 2.5–4.5)
POTASSIUM SERPL-SCNC: 3.5 MMOL/L (ref 3.4–5.3)
SODIUM SERPL-SCNC: 142 MMOL/L (ref 133–144)

## 2017-06-03 PROCEDURE — A9270 NON-COVERED ITEM OR SERVICE: HCPCS | Mod: GY | Performed by: PSYCHIATRY & NEUROLOGY

## 2017-06-03 PROCEDURE — A9270 NON-COVERED ITEM OR SERVICE: HCPCS | Mod: GY | Performed by: INTERNAL MEDICINE

## 2017-06-03 PROCEDURE — 25000132 ZZH RX MED GY IP 250 OP 250 PS 637: Mod: GY | Performed by: HOSPITALIST

## 2017-06-03 PROCEDURE — 25000132 ZZH RX MED GY IP 250 OP 250 PS 637: Mod: GY | Performed by: INTERNAL MEDICINE

## 2017-06-03 PROCEDURE — 80048 BASIC METABOLIC PNL TOTAL CA: CPT | Performed by: INTERNAL MEDICINE

## 2017-06-03 PROCEDURE — 36415 COLL VENOUS BLD VENIPUNCTURE: CPT | Performed by: INTERNAL MEDICINE

## 2017-06-03 PROCEDURE — A9270 NON-COVERED ITEM OR SERVICE: HCPCS | Mod: GY | Performed by: HOSPITALIST

## 2017-06-03 PROCEDURE — 25000132 ZZH RX MED GY IP 250 OP 250 PS 637: Mod: GY | Performed by: PSYCHIATRY & NEUROLOGY

## 2017-06-03 PROCEDURE — 84100 ASSAY OF PHOSPHORUS: CPT | Performed by: INTERNAL MEDICINE

## 2017-06-03 PROCEDURE — 40000275 ZZH STATISTIC RCP TIME EA 10 MIN

## 2017-06-03 PROCEDURE — 99239 HOSP IP/OBS DSCHRG MGMT >30: CPT | Performed by: INTERNAL MEDICINE

## 2017-06-03 RX ORDER — FUROSEMIDE 40 MG
40 TABLET ORAL DAILY
Status: DISCONTINUED | OUTPATIENT
Start: 2017-06-04 | End: 2017-06-03 | Stop reason: HOSPADM

## 2017-06-03 RX ORDER — QUETIAPINE FUMARATE 25 MG/1
25 TABLET, FILM COATED ORAL AT BEDTIME
Qty: 7 TABLET | Refills: 0 | Status: SHIPPED | OUTPATIENT
Start: 2017-06-03 | End: 2017-06-10

## 2017-06-03 RX ORDER — FUROSEMIDE 20 MG
20 TABLET ORAL ONCE
Status: COMPLETED | OUTPATIENT
Start: 2017-06-03 | End: 2017-06-03

## 2017-06-03 RX ORDER — FUROSEMIDE 40 MG
40 TABLET ORAL DAILY
Qty: 7 TABLET | Refills: 0 | Status: ON HOLD | DISCHARGE
Start: 2017-06-04 | End: 2018-02-06

## 2017-06-03 RX ORDER — HYDROCODONE BITARTRATE AND ACETAMINOPHEN 5; 325 MG/1; MG/1
1 TABLET ORAL EVERY 4 HOURS PRN
Qty: 21 TABLET | Refills: 0 | Status: ON HOLD | OUTPATIENT
Start: 2017-06-03 | End: 2018-02-06

## 2017-06-03 RX ORDER — BUPROPION HYDROCHLORIDE 150 MG/1
150 TABLET ORAL DAILY
Qty: 7 TABLET | Refills: 0 | Status: ON HOLD | OUTPATIENT
Start: 2017-06-03 | End: 2018-02-06

## 2017-06-03 RX ORDER — ACETAMINOPHEN 325 MG/1
650 TABLET ORAL 3 TIMES DAILY
Qty: 100 TABLET | DISCHARGE
Start: 2017-06-03

## 2017-06-03 RX ORDER — GABAPENTIN 300 MG/1
300 CAPSULE ORAL AT BEDTIME
Qty: 7 CAPSULE | Refills: 0 | Status: ON HOLD | OUTPATIENT
Start: 2017-06-03 | End: 2018-02-06

## 2017-06-03 RX ORDER — QUETIAPINE FUMARATE 25 MG/1
12.5 TABLET, FILM COATED ORAL EVERY 6 HOURS PRN
Qty: 14 TABLET | Refills: 0 | Status: SHIPPED | OUTPATIENT
Start: 2017-06-03 | End: 2018-01-27

## 2017-06-03 RX ADMIN — BUPROPION HYDROCHLORIDE 150 MG: 150 TABLET, FILM COATED, EXTENDED RELEASE ORAL at 08:38

## 2017-06-03 RX ADMIN — HYDROCODONE BITARTRATE AND ACETAMINOPHEN 1 TABLET: 5; 325 TABLET ORAL at 16:05

## 2017-06-03 RX ADMIN — ACETAMINOPHEN 650 MG: 325 TABLET, FILM COATED ORAL at 15:35

## 2017-06-03 RX ADMIN — ALLOPURINOL 100 MG: 100 TABLET ORAL at 08:38

## 2017-06-03 RX ADMIN — FUROSEMIDE 20 MG: 20 TABLET ORAL at 08:38

## 2017-06-03 RX ADMIN — ACETAMINOPHEN 650 MG: 325 TABLET, FILM COATED ORAL at 08:38

## 2017-06-03 RX ADMIN — ATENOLOL 50 MG: 50 TABLET ORAL at 08:38

## 2017-06-03 RX ADMIN — FUROSEMIDE 20 MG: 20 TABLET ORAL at 10:11

## 2017-06-03 NOTE — PROGRESS NOTES
Pt on CPAP +10  21% throughout the night, gel pad and mepilex placed on under the mask. Pt tolerated well. Will continue to follow.  6/3/2017  Saranya Mendez

## 2017-06-03 NOTE — DISCHARGE SUMMARY
[facility]    Discharge Summary  Hospitalist    Date of Admission:  5/31/2017  Date of Discharge:  6/3/2017  Discharging Provider: Ciera Mccauley MD  Date of Service (when I saw the patient): 06/03/17    Discharge Diagnoses   Hallucinations and delirium due to oxycodone use and sleep cycle dysregulation  Chronic left hip pain due to severe arthritis  Chronic lower extremity edema with superficial wounds  Hx COPD  Deconditioned    History of Present Illness   Please see admission H&P for full details.    Hospital Course   Deion Diaz was admitted on 5/31/2017.  The following problems were addressed during his hospitalization:    1. Hallucinations and delirium due to narcotic use and sleep deprived.  Concurrent initiation of Bactrim and upgrading from Vicodin to oxycodone.  Bactrim and oxycodone discontinued on admission.  CT head without acute findings. Lab studies and vital signs not suggestive of infection. UA only with 2-5 WBC and few bacteria, trace LE and negative nitrite. Difficult time managing left hip pain as he is in the process of being evaluated as an OP for hip surgery, however due to LE wounds this has been delayed. Acutely agitated and combative 6/2 morning, code 21 called and required IM haldol with sitter. Patient states this was due to lack of sleep and not getting Norco which provides him with relief enough to sleep. Patient normally sleeps seated due to left hip pain. Daughter assisted with establishing baseline, other than acute agitation/delirium 6/2 morning he remained oriented x 3 and followed all commands. On discharge day he remained hemodynamically stable and did not require IV/IM antipsychotics nor a sitter for 24 hours. He states occasionally seeing animals but admits to having floaters for many years which may obscuring his vision at baseline. Psychiatry did see the patient and changed Wellbutrin to long acting and started seroquel at night, along with prn during  days. EKG showed a normal QTc. As he is compliant with treatment and hallucinations are improving with each day, it is felt he is safe for discharge to TCU. Patient required extensive counseling regarding the benefits of TCU stay. He wants to return home but is an assist of two, and eventually agreed to TCU after discussing with pt and daughter at bedside. Since he is motivated to have hip surgery he will require condition training to allow for LE wound healing and he understands this now.      2.  Worsening left hip pain:  No recent trauma.  Clearly this is the driving force behind his narcotic use as well as with difficulty even raising his legs for treating his lower extremity edema. No more oxycodone. Orthopedics consult appreciated, XR hip without acute fractures. Management as above. Continue scheduled Tylenol. Cautious use of vicodin as he has tolerated this in the past. Patient does not feel lidoderm patch provided relief thus stopped. Continued on PTA gabapentin at night.     3.  Coronary artery disease:  No chest pain.    - PTA atenolol.  - Resume statin at discharge      4.  Lower extremity edema with superficial wounds: Appreciate Wound Care consult, please see their recommendations. Encourage elevate legs as much as possible. Given extra dose of lasix and renal function/electrolytes tolerated this. As such, have increased oral daily lasix at discharge. Recommend TCU MD to recheck BMP and adjust lasix as clinically indicated.      5.  BPH:  PTA finasteride and tamsulosin.       6.  Chronic obstructive pulmonary disease:  Breathing at baseline.    - PTA Advair and p.r.n. albuterol.   - PTA CPAP home settings.    Active Problems:    Failure to thrive in adult    Hallucination, drug-induced (H)    Ciera Mccauley MD    Significant Results and Procedures   No procedures on this admission.    Pending Results   These results will be followed up by TCU MD.  Unresulted Labs Ordered in the Past 30 Days of this  Admission     No orders found from 4/1/2017 to 6/1/2017.        Code Status   Full Code       Primary Care Physician   Jerrod Graves    Physical Exam   Temp: 98.3  F (36.8  C) Temp src: Oral BP: 117/59 Pulse: 93 Heart Rate: 83 Resp: 18 SpO2: 90 % O2 Device: None (Room air)    There were no vitals filed for this visit.  Vital Signs with Ranges  Temp:  [97.6  F (36.4  C)-98.3  F (36.8  C)] 98.3  F (36.8  C)  Pulse:  [83-93] 93  Heart Rate:  [83] 83  Resp:  [18-20] 18  BP: (117-133)/(59-73) 117/59  SpO2:  [90 %-93 %] 90 %  I/O last 3 completed shifts:  In: 270 [P.O.:270]  Out: 650 [Urine:650]    Constitutional: Awake, alert, cooperative, no apparent distress  Respiratory: Clear to auscultation bilaterally, no crackles or wheezing  Cardiovascular: Regular rate and rhythm, normal S1 and S2, and no murmur noted  GI: Normal bowel sounds, soft, non-distended, non-tender, obese abd  Skin/Integumen: LE edema up to knees b/l 2+, wounds noted and nondraining, otherwise unchanged from previous.   Other: Oriented x 3. Follows all commands. Strength 5/5 throughout except at left hip due to pain. CN2-12 grossly intact.    Discharge Disposition   Discharged to nursing home  Condition at discharge: Satisfactory    Consultations This Hospital Stay   SOCIAL WORK IP CONSULT  CARE COORDINATOR IP CONSULT  PHYSICAL THERAPY ADULT IP CONSULT  ORTHOPEDICS IP CONSULT  WOUND OSTOMY CONTINENCE NURSE  IP CONSULT  PSYCHIATRY IP CONSULT  PSYCHIATRY IP CONSULT  PHYSICAL THERAPY ADULT IP CONSULT    Time Spent on this Encounter   Ciera MOORE, personally saw the patient today and spent greater than 30 minutes discharging this patient.    Discharge Orders     General info for SNF   Length of Stay Estimate: Short Term Care: Estimated # of Days <30  Condition at Discharge: Improving  Level of care:skilled   Rehabilitation Potential: Fair  Admission H&P remains valid and up-to-date: Yes  Recent Chemotherapy: N/A  Use Nursing Home Standing  Orders: Yes     Mantoux instructions   Give two-step Mantoux (PPD) Per Facility Policy Yes     Reason for your hospital stay   Further evaluation of hallucinations due to medication side effect.     Intake and output   Every shift     Daily weights   Call Provider for weight gain of more than 2 pounds per day or 5 pounds per week.     Follow Up and recommended labs and tests   Follow up with [facility] physician.  The following labs/tests are recommended: BMP. Increased lasix prior to discharge, adjust as necessary to aid with lower extremity edema and wound healing.     Activity - Up with nursing assistance     Additional Discharge Instructions   CPAP home settings every night.     Full Code     Physical Therapy Adult Consult   Evaluate and treat as clinically indicated.    Reason:  Deconditioned and severe left hip arthritis.     Fall precautions     Advance Diet as Tolerated   Follow this diet upon discharge: Orders Placed This Encounter     Regular Diet Adult       Discharge Medications   Current Discharge Medication List      START taking these medications    Details   acetaminophen (TYLENOL) 325 MG tablet Take 2 tablets (650 mg) by mouth 3 times daily  Qty: 100 tablet    Associated Diagnoses: Other chronic pain      buPROPion (WELLBUTRIN XL) 150 MG 24 hr tablet Take 1 tablet (150 mg) by mouth daily for 7 days  Qty: 7 tablet, Refills: 0    Associated Diagnoses: Hallucinations      HYDROcodone-acetaminophen (NORCO) 5-325 MG per tablet Take 1 tablet by mouth every 4 hours as needed for moderate to severe pain  Qty: 21 tablet, Refills: 0    Associated Diagnoses: Other chronic pain      !! QUEtiapine (SEROQUEL) 25 MG tablet Take 1 tablet (25 mg) by mouth At Bedtime for 7 days  Qty: 7 tablet, Refills: 0    Associated Diagnoses: Hallucinations      !! QUEtiapine (SEROQUEL) 25 MG tablet Take 0.5 tablets (12.5 mg) by mouth every 6 hours as needed (halucinations)  Qty: 14 tablet, Refills: 0    Associated Diagnoses:  Hallucinations       !! - Potential duplicate medications found. Please discuss with provider.      CONTINUE these medications which have CHANGED    Details   furosemide (LASIX) 40 MG tablet Take 1 tablet (40 mg) by mouth daily Hold for SBP<100  Qty: 7 tablet, Refills: 0    Associated Diagnoses: Localized edema      gabapentin (NEURONTIN) 300 MG capsule Take 1 capsule (300 mg) by mouth At Bedtime for 7 days  Qty: 7 capsule, Refills: 0    Associated Diagnoses: Other chronic pain         CONTINUE these medications which have NOT CHANGED    Details   NONFORMULARY Hydrocortisone 2.5% cream mixed 1:1 with Vanicream apply BID from neck down to affected area      FINASTERIDE PO Take 5 mg by mouth daily      Ca Carbonate-Mag Hydroxide (ROLAIDS PO) Take by mouth as needed      TAMSULOSIN HCL PO Take 0.4 mg by mouth At Bedtime      fluticasone-salmeterol (ADVAIR) 500-50 MCG/DOSE diskus inhaler Inhale 1 puff into the lungs every 12 hours      albuterol (PROAIR HFA, PROVENTIL HFA, VENTOLIN HFA) 108 (90 BASE) MCG/ACT inhaler Inhale 2 puffs into the lungs every 6 hours as needed       ALLOPURINOL PO Take 100 mg by mouth daily       ATENOLOL PO Take 50 mg by mouth daily       multivitamin, therapeutic with minerals (MULTI-VITAMIN) TABS Take 1 tablet by mouth daily      NITROGLYCERIN SL Place 0.4 mg under the tongue      ATORVASTATIN CALCIUM PO Take 20 mg by mouth daily          STOP taking these medications       buPROPion (WELLBUTRIN SR) 150 MG 12 hr tablet Comments:   Reason for Stopping:         OXYCODONE HCL PO Comments:   Reason for Stopping:             Allergies   No Known Allergies  Data   Most Recent 3 CBC's:  Recent Labs   Lab Test  05/31/17   2154  05/31/17   1620  08/24/16   1201   WBC  10.8  10.5  17.1*   HGB  13.0*  14.4  14.6   MCV  90  91  92   PLT  287  327  229      Most Recent 3 BMP's:  Recent Labs   Lab Test  06/03/17   0816  06/01/17   0744  05/31/17   1620   NA  142  139  137   POTASSIUM  3.5  3.9  4.0    CHLORIDE  107  104  102   CO2  26  25  27   BUN  30  21  21   CR  1.19  1.32*  1.33*   ANIONGAP  9  10  8   LOU  9.2  9.2  9.7   GLC  195*  93  86     Most Recent 2 LFT's:No lab results found.  Most Recent INR's and Anticoagulation Dosing History:  Anticoagulation Dose History     There is no flowsheet data to display.        Most Recent 3 Troponin's:No lab results found.  Most Recent Cholesterol Panel:No lab results found.  Most Recent 6 Bacteria Isolates From Any Culture (See EPIC Reports for Culture Details):  Recent Labs   Lab Test  08/24/16   0624   CULT  >100,000 colonies/mL Klebsiella pneumoniae*     Most Recent TSH, T4 and A1c Labs:No lab results found.  Results for orders placed or performed during the hospital encounter of 05/31/17   Head CT w/o contrast    Narrative    CT HEAD W/O CONTRAST   5/31/2017 5:18 PM     HISTORY: confusion    TECHNIQUE:  Axial images of the head without IV contrast material.  Radiation dose for this scan was reduced using automated exposure  control, adjustment of the mA and/or kV according to patient size, or  iterative reconstruction technique.    COMPARISON: None.    FINDINGS: The ventricles are normal in size, shape and configuration.  The brain parenchyma and subarachnoid spaces are normal. There is no  evidence of intracranial hemorrhage, mass, acute infarct or anomaly.  The visualized portions of the sinuses and mastoids appear normal.  There is no evidence of trauma.      Impression    IMPRESSION:  No acute pathology, no bleed, mass, or acute infarcts are  seen.    JOSIAH PARTIDA MD   XR Pelvis and Hip Bilateral 2 Views    Narrative    XR PELVIS AND HIP BILATERAL 2 VIEWS 6/1/2017 10:25 AM    COMPARISON: None.    HISTORY: Left hip pain.      Impression    IMPRESSION: Severe left hip osteoarthritis with complete loss of joint  space and significant femoral head remodeling. No definite fractures  are seen. Mild degenerative changes in the right hip. Partially imaged  lumbar  spinal fusion hardware.    KARRIE SALAZAR

## 2017-06-03 NOTE — PROGRESS NOTES
Sw:    I/P:  Sunny confirmed with Pete Jules that pt can d/c today but did request to speak with RN prior to d/c.  Sw provided nursing number.  Per SW ramo pt/daughter is in agreement to TCU and is aware of the private pay costs.  Per rounds pt may d/c today.  Sunny contacted daughter and updated her on potential d/c today.  Daughter stated that pt needs medical transport but not stretcher due to not being able to lay down.  Daughter stated that pt is now stabilized but if needed, she will ride with pt.  Sw explained that w/c transport via  is private pay.  Daughter asked that Sw connect with pt to ask him what transportation he prefers:  [facility] or [employer].  CC spoke with pt and he stated that he would prefer [facility].  Sunny contacted [facility] and rep confirmed that they cannot provide transport today unless pt was to ride airport taxi which would not meet his medical needs.  Sw updated daughter and daughter agreed with [facility] transport and will update pt.      Update:  SUNNY arranged w/c transport with Christophe from  for 1630.  Sunny updated RN, daughter, and [facility].  Sw to fax orders/scripts/PAS once complete.

## 2017-06-04 NOTE — PROGRESS NOTES
SUNNY  PAS-RR    D: Per DHS regulation, SW completed and submitted PAS-RR to [place] [employer] via the Senior LinkAge Line.  PAS-RR confirmation # is :[id number]    I: SW spoke with patient and they are aware a PAS-RR has been submitted.  SW reviewed with patient that they may be contacted for a follow up appointment within 10 days of hospital discharge if their SNF stay is < 30 days.  Contact information for Senior [employer] Line was also provided.    A: Patient verbalized understanding.    P: Further questions may be directed to [facility] LinkAge Line at #[phone], option #4 for PAS-RR staff.

## 2017-06-07 LAB — INTERPRETATION ECG - MUSE: NORMAL

## 2018-01-26 ENCOUNTER — HOSPITAL ENCOUNTER (INPATIENT)
Facility: CLINIC | Age: 78
LOS: 10 days | Discharge: NURSING, REHAB OR LONG-TERM CARE | DRG: 884 | End: 2018-02-06
Attending: PSYCHIATRY & NEUROLOGY | Admitting: INTERNAL MEDICINE
Payer: MEDICARE

## 2018-01-26 DIAGNOSIS — T84.59XD INFECTION OF PROSTHETIC HIP JOINT, SUBSEQUENT ENCOUNTER: ICD-10-CM

## 2018-01-26 DIAGNOSIS — Z96.649 INFECTION OF PROSTHETIC HIP JOINT, SUBSEQUENT ENCOUNTER: ICD-10-CM

## 2018-01-26 DIAGNOSIS — G47.00 INSOMNIA, UNSPECIFIED TYPE: ICD-10-CM

## 2018-01-26 DIAGNOSIS — R44.1 VISUAL HALLUCINATION: ICD-10-CM

## 2018-01-26 DIAGNOSIS — I73.9 PVD (PERIPHERAL VASCULAR DISEASE) (H): Primary | ICD-10-CM

## 2018-01-26 DIAGNOSIS — R44.2 HALLUCINATIONS OF TACTILE SENSATION: ICD-10-CM

## 2018-01-26 PROCEDURE — 99285 EMERGENCY DEPT VISIT HI MDM: CPT | Mod: Z6 | Performed by: PSYCHIATRY & NEUROLOGY

## 2018-01-26 PROCEDURE — 99285 EMERGENCY DEPT VISIT HI MDM: CPT | Performed by: PSYCHIATRY & NEUROLOGY

## 2018-01-26 ASSESSMENT — ENCOUNTER SYMPTOMS
APPETITE CHANGE: 1
NERVOUS/ANXIOUS: 1
FREQUENCY: 1
ACTIVITY CHANGE: 1
DYSURIA: 1
SLEEP DISTURBANCE: 1
WHEEZING: 0
FEVER: 0
CHOKING: 0
DECREASED CONCENTRATION: 1
COUGH: 0
HALLUCINATIONS: 1
STRIDOR: 0
EYES NEGATIVE: 1
ARTHRALGIAS: 1

## 2018-01-26 NOTE — IP AVS SNAPSHOT
MRN:[id number]                      After Visit Summary   1/26/2018    Deion Diaz    MRN: [id number]           Thank you!     Thank you for choosing [facility] for your care. Our goal is always to provide you with excellent care. Hearing back from our patients is one way we can continue to improve our services. Please take a few minutes to complete the written survey that you may receive in the mail after you visit with us. Thank you!        Patient Information     Date Of Birth          1940        Designated Caregiver       Most Recent Value    Caregiver    Will someone help with your care after discharge? yes    Name of designated caregiver Rehab facility     Phone number of caregiver TBD    Caregiver address TBD      About your hospital stay     You were admitted on:  January 27, 2018 You last received care in the:  [facility]    You were discharged on:  February 6, 2018        Reason for your hospital stay       Hallucinations                  Who to Call     For medical emergencies, please call 911.  For non-urgent questions about your medical care, please call your primary care provider or clinic, [phone]          Attending Provider     Provider Specialty    Chris Diaz MD Psychiatry    [facility] Oliver Rosen MD Emergency Medicine    Denilson Edmondson MD Internal Medicine    Grady Mccall MD Internal Medicine       Primary Care Provider Office Phone # Fax #    Jerrod Graves [phone]      After Care Instructions     Activity       Your activity upon discharge: activity as tolerated            Diet       Follow this diet upon discharge: 2g salt                  Follow-up Appointments     Adult [facility] Follow-up and recommended labs and tests       Follow up with primary care provider, Jerrod Graves, within 7 days for hospital follow- up.  No follow up labs or test are needed.  Follow up with psychiatry in   2 weeks  Follow up with  neuropsychiatry in 2-4 weeks   Follow up with Dr.James Abbott at [facility] memory clinic     Appointments on [place] and/or [facility] (with [facility] or [facility] provider or service). Call [phone] if you haven't heard regarding these appointments within 7 days of discharge.                  Pending Results     No orders found from 1/24/2018 to 1/27/2018.            Statement of Approval     Ordered          02/06/18 1426  I have reviewed and agree with all the recommendations and orders detailed in this document.  EFFECTIVE NOW     Approved and electronically signed by:  Grady Mccall MD             Admission Information     Date & Time Provider Department Dept. Phone    1/26/2018 Grady Mccall MD [facility] [phone]      Your Vitals Were     Blood Pressure Pulse Temperature Respirations Weight Pulse Oximetry    120/56 84 96.2  F (35.7  C) (Oral) 16 77.3 kg (170 lb 6.4 oz) 95%    BMI (Body Mass Index)                   28.36 kg/m2           [employer] Information     [employer] gives you secure access to your electronic health record. If you see a primary care provider, you can also send messages to your care team and make appointments. If you have questions, please call your primary care clinic.  If you do not have a primary care provider, please call [phone] and they will assist you.        Care EveryWhere ID     This is your Care EveryWhere ID. This could be used by other organizations to access your [facility] medical records  [id number]        Equal Access to Services     CECILE ACOSTA : Hadii monserrat Perrin, waaxda luvernaadaha, qaybta kaalmada kajal, hermann siddiqui. So [facility] [phone].    ATENCIÓN: Si habla español, tiene a kaufman disposición servicios gratuitos de asistencia lingüística. Llame al [phone].    We comply with applicable federal civil rights laws and [place] laws. We do not discriminate on the basis of race, color, national origin, age, disability, sex,  sexual orientation, or gender identity.               Review of your medicines      START taking        Dose / Directions    aspirin 81 MG EC tablet   Used for:  PVD (peripheral vascular disease) (H)        Dose:  81 mg   Start taking on:  2/7/2018   Take 1 tablet (81 mg) by mouth daily   Quantity:  30 tablet   Refills:  0       clindamycin 300 MG capsule   Commonly known as:  CLEOCIN   Indication:  hip infection suppression   Used for:  Infection of prosthetic hip joint, subsequent encounter        Dose:  300 mg   Take 1 capsule (300 mg) by mouth 2 times daily   Quantity:  60 capsule   Refills:  1       melatonin 1 MG Tabs tablet   Used for:  Insomnia, unspecified type        Dose:  1 mg   Take 1 tablet (1 mg) by mouth nightly as needed for sleep   Quantity:  30 tablet   Refills:  1       rivastigmine 4.6 MG/24HR 24 hr patch   Commonly known as:  EXELON   Used for:  Hallucinations of tactile sensation        Dose:  1 patch   Start taking on:  2/7/2018   Place 1 patch onto the skin daily   Quantity:  30 patch   Refills:  1         CONTINUE these medicines which have NOT CHANGED        Dose / Directions    acetaminophen 325 MG tablet   Commonly known as:  TYLENOL   Used for:  Other chronic pain        Dose:  650 mg   Take 2 tablets (650 mg) by mouth 3 times daily   Quantity:  100 tablet   Refills:  0       albuterol 108 (90 BASE) MCG/ACT Inhaler   Commonly known as:  PROAIR HFA/PROVENTIL HFA/VENTOLIN HFA        Dose:  2 puff   Inhale 2 puffs into the lungs every 6 hours as needed   Refills:  0       ALLOPURINOL PO        Dose:  100 mg   Take 100 mg by mouth daily   Refills:  0       ATENOLOL PO        Dose:  50 mg   Take 50 mg by mouth daily   Refills:  0       ATORVASTATIN CALCIUM PO        Dose:  20 mg   Take 20 mg by mouth daily   Refills:  0       fluticasone-salmeterol 500-50 MCG/DOSE diskus inhaler   Commonly known as:  ADVAIR        Dose:  1 puff   Inhale 1 puff into the lungs every 12 hours   Refills:  0        Multi-vitamin Tabs tablet        Dose:  1 tablet   Take 1 tablet by mouth daily   Refills:  0       NITROGLYCERIN SL        Dose:  0.4 mg   Place 0.4 mg under the tongue   Refills:  0       TAMSULOSIN HCL PO        Dose:  0.4 mg   Take 0.4 mg by mouth At Bedtime   Refills:  0         STOP taking     buPROPion 150 MG 24 hr tablet   Commonly known as:  WELLBUTRIN XL           furosemide 40 MG tablet   Commonly known as:  LASIX           gabapentin 300 MG capsule   Commonly known as:  NEURONTIN           HYDROcodone-acetaminophen 5-325 MG per tablet   Commonly known as:  NORCO           NONFORMULARY           ROLAIDS PO                Where to get your medicines      These medications were sent to [facility] [place], [place] - [address]  [address] [place] [address], [facility] [address]     Phone:  [phone]     aspirin 81 MG EC tablet    clindamycin 300 MG capsule    melatonin 1 MG Tabs tablet         Some of these will need a paper prescription and others can be bought over the counter. Ask your nurse if you have questions.     Bring a paper prescription for each of these medications     rivastigmine 4.6 MG/24HR 24 hr patch                Protect others around you: Learn how to safely use, store and throw away your medicines at www.disposemymeds.org.        ANTIBIOTIC INSTRUCTION     You've Been Prescribed an Antibiotic - Now What?  Your healthcare team thinks that you or your loved one might have an infection. Some infections can be treated with antibiotics, which are powerful, life-saving drugs. Like all medications, antibiotics have side effects and should only be used when necessary. There are some important things you should know about your antibiotic treatment.      Your healthcare team may run tests before you start taking an antibiotic.    Your team may take samples (e.g., from your blood, urine or other areas) to run tests to look for bacteria. These test can be important to determine  if you need an antibiotic at all and, if you do, which antibiotic will work best.      Within a few days, your healthcare team might change or even stop your antibiotic.    Your team may start you on an antibiotic while they are working to find out what is making you sick.    Your team might change your antibiotic because test results show that a different antibiotic would be better to treat your infection.    In some cases, once your team has more information, they learn that you do not need an antibiotic at all. They may find out that you don't have an infection, or that the antibiotic you're taking won't work against your infection. For example, an infection caused by a virus can't be treated with antibiotics. Staying on an antibiotic when you don't need it is more likely to be harmful than helpful.      You may experience side effects from your antibiotic.    Like all medications, antibiotics have side effects. Some of these can be serious.    Let you healthcare team know if you have any known allergies when you are admitted to the hospital.    One significant side effect of nearly all antibiotics is the risk of severe and sometimes deadly diarrhea caused by Clostridium difficile (C. Difficile). This occurs when a person takes antibiotics because some good germs are destroyed. Antibiotic use allows C. diificile to take over, putting patients at high risk for this serious infection.    As a patient or caregiver, it is important to understand your or your loved one's antibiotic treatment. It is especially important for caregivers to speak up when patients can't speak for themselves. Here are some important questions to ask your healthcare team.    What infection is this antibiotic treating and how do you know I have that infection?    What side effects might occur from this antibiotic?    How long will I need to take this antibiotic?    Is it safe to take this antibiotic with other medications or supplements  (e.g., vitamins) that I am taking?     Are there any special directions I need to know about taking this antibiotic? For example, should I take it with food?    How will I be monitored to know whether my infection is responding to the antibiotic?    What tests may help to make sure the right antibiotic is prescribed for me?      Information provided by:  www.cdc.gov/getsmart  U.S. Department of Health and Human Services  Centers for disease Control and Prevention  National Center for Emerging and Zoonotic Infectious Diseases  Division of Healthcare Quality Promotion             Medication List: This is a list of all your medications and when to take them. Check marks below indicate your daily home schedule. Keep this list as a reference.      Medications           Morning Afternoon Evening Bedtime As Needed    acetaminophen 325 MG tablet   Commonly known as:  TYLENOL   Take 2 tablets (650 mg) by mouth 3 times daily   Last time this was given:  650 mg on 2/6/2018 12:15 AM                                albuterol 108 (90 BASE) MCG/ACT Inhaler   Commonly known as:  PROAIR HFA/PROVENTIL HFA/VENTOLIN HFA   Inhale 2 puffs into the lungs every 6 hours as needed   Last time this was given:  2 puffs on 1/29/2018  8:31 PM                                ALLOPURINOL PO   Take 100 mg by mouth daily   Last time this was given:  100 mg on 2/6/2018  9:07 AM                                aspirin 81 MG EC tablet   Take 1 tablet (81 mg) by mouth daily   Start taking on:  2/7/2018   Last time this was given:  81 mg on 2/6/2018  9:06 AM                                ATENOLOL PO   Take 50 mg by mouth daily   Last time this was given:  50 mg on 2/6/2018  9:07 AM                                ATORVASTATIN CALCIUM PO   Take 20 mg by mouth daily   Last time this was given:  20 mg on 2/5/2018  8:16 PM                                clindamycin 300 MG capsule   Commonly known as:  CLEOCIN   Take 1 capsule (300 mg) by mouth 2 times daily    Last time this was given:  300 mg on 2/6/2018  9:07 AM                                fluticasone-salmeterol 500-50 MCG/DOSE diskus inhaler   Commonly known as:  ADVAIR   Inhale 1 puff into the lungs every 12 hours                                melatonin 1 MG Tabs tablet   Take 1 tablet (1 mg) by mouth nightly as needed for sleep                                Multi-vitamin Tabs tablet   Take 1 tablet by mouth daily   Last time this was given:  1 tablet on 2/6/2018  9:07 AM                                NITROGLYCERIN SL   Place 0.4 mg under the tongue                                rivastigmine 4.6 MG/24HR 24 hr patch   Commonly known as:  EXELON   Place 1 patch onto the skin daily   Start taking on:  2/7/2018   Last time this was given:  1 patch on 2/6/2018  9:11 AM                                TAMSULOSIN HCL PO   Take 0.4 mg by mouth At Bedtime   Last time this was given:  0.4 mg on 2/5/2018  9:37 PM

## 2018-01-26 NOTE — IP AVS SNAPSHOT
[facility]    [address]    [facility] [address]    Phone:  [phone]                                       After Visit Summary   1/26/2018    Deion Diaz    MRN: [id number]           After Visit Summary Signature Page     I have received my discharge instructions, and my questions have been answered. I have discussed any challenges I see with this plan with the nurse or doctor.    ..........................................................................................................................................  Patient/Patient Representative Signature      ..........................................................................................................................................  Patient Representative Print Name and Relationship to Patient    ..................................................               ................................................  Date                                            Time    ..........................................................................................................................................  Reviewed by Signature/Title    ...................................................              ..............................................  Date                                                            Time

## 2018-01-26 NOTE — IP AVS SNAPSHOT
"    [facility]: [phone]                                              INTERAGENCY TRANSFER FORM - PHYSICIAN ORDERS   2018                    Hospital Admission Date: 2018  EMILIA ALMARAZ   : 1940  Sex: Male        Attending Provider: Grady Mccall MD     Allergies:  No Known Allergies    Infection:  None   Service:  INTERNAL MED    Ht:  1.651 m (5' 5\")   Wt:  77.3 kg (170 lb 6.4 oz)   Admission Wt:  73.9 kg (163 lb)    BMI:  28.36 kg/m 2   BSA:  1.88 m 2            Patient PCP Information     Provider PCP Type    Jerrod Graves MD General      ED Clinical Impression     Diagnosis Description Comment Added By Time Added    Hallucinations of tactile sensation [R44.2] Hallucinations of tactile sensation [R44.2]  Chris Diaz MD 2018 11:24 PM    Visual hallucination [R44.1] Visual hallucination [R44.1]  Chris Diaz MD 2018 11:24 PM      Hospital Problems as of 2018              Priority Class Noted POA    Hallucinations Medium  2018 Yes      Non-Hospital Problems as of 2018              Priority Class Noted    Failure to thrive in adult Medium  2017    Hallucination, drug-induced (H) Medium  2017      Code Status History     Date Active Date Inactive Code Status Order ID Comments User Context    2018  2:25 PM  Full Code [id number]  Grady Mccall MD Outpatient    2018  5:51 AM 2018  2:25 PM Full Code [id number]  Ayo Chaidez MD Inpatient    6/3/2017 11:17 AM 2018  5:51 AM Full Code [id number]  Ciera Mccauley MD Outpatient    2017  7:32 PM 6/3/2017 11:17 AM Full Code [id number]  Jay Ovalle MD Inpatient         Medication Review      START taking        Dose / Directions Comments    aspirin 81 MG EC tablet   Used for:  PVD (peripheral vascular disease) (H)        Dose:  81 mg   Start taking on:  2018   Take 1 tablet (81 mg) by mouth daily   Quantity:  30 tablet   Refills:  0        clindamycin 300 MG capsule "   Commonly known as:  CLEOCIN   Indication:  hip infection suppression   Used for:  Infection of prosthetic hip joint, subsequent encounter        Dose:  300 mg   Take 1 capsule (300 mg) by mouth 2 times daily   Quantity:  60 capsule   Refills:  1        melatonin 1 MG Tabs tablet   Used for:  Insomnia, unspecified type        Dose:  1 mg   Take 1 tablet (1 mg) by mouth nightly as needed for sleep   Quantity:  30 tablet   Refills:  1        rivastigmine 4.6 MG/24HR 24 hr patch   Commonly known as:  EXELON   Used for:  Hallucinations of tactile sensation        Dose:  1 patch   Start taking on:  2/7/2018   Place 1 patch onto the skin daily   Quantity:  30 patch   Refills:  0          CONTINUE these medications which have NOT CHANGED        Dose / Directions Comments    acetaminophen 325 MG tablet   Commonly known as:  TYLENOL   Used for:  Other chronic pain        Dose:  650 mg   Take 2 tablets (650 mg) by mouth 3 times daily   Quantity:  100 tablet   Refills:  0        albuterol 108 (90 BASE) MCG/ACT Inhaler   Commonly known as:  PROAIR HFA/PROVENTIL HFA/VENTOLIN HFA        Dose:  2 puff   Inhale 2 puffs into the lungs every 6 hours as needed   Refills:  0        ALLOPURINOL PO        Dose:  100 mg   Take 100 mg by mouth daily   Refills:  0        ATENOLOL PO        Dose:  50 mg   Take 50 mg by mouth daily   Refills:  0        ATORVASTATIN CALCIUM PO        Dose:  20 mg   Take 20 mg by mouth daily   Refills:  0        fluticasone-salmeterol 500-50 MCG/DOSE diskus inhaler   Commonly known as:  ADVAIR        Dose:  1 puff   Inhale 1 puff into the lungs every 12 hours   Refills:  0        Multi-vitamin Tabs tablet        Dose:  1 tablet   Take 1 tablet by mouth daily   Refills:  0        NITROGLYCERIN SL        Dose:  0.4 mg   Place 0.4 mg under the tongue   Refills:  0        TAMSULOSIN HCL PO        Dose:  0.4 mg   Take 0.4 mg by mouth At Bedtime   Refills:  0          STOP taking     buPROPion 150 MG 24 hr tablet    Commonly known as:  WELLBUTRIN XL           furosemide 40 MG tablet   Commonly known as:  LASIX           gabapentin 300 MG capsule   Commonly known as:  NEURONTIN           HYDROcodone-acetaminophen 5-325 MG per tablet   Commonly known as:  NORCO           NONFORMULARY           ROLAIDS PO                   Summary of Visit     Reason for your hospital stay       Hallucinations             After Care     Activity       Your activity upon discharge: activity as tolerated       Diet       Follow this diet upon discharge: 2g salt             Follow-Up Appointment Instructions     Future Labs/Procedures    Adult [facility] Follow-up and recommended labs and tests     Comments:    Follow up with primary care provider, Jerrod Graves, within 7 days for hospital follow- up.  No follow up labs or test are needed.  Follow up with psychiatry in   2 weeks  Follow up with neuropsychiatry in 2-4 weeks   Follow up with Dr.James Abbott at [facility] memory clinic     Appointments on [facility] (with [facility] or [facility] provider or service). Call [phone] if you haven't heard regarding these appointments within 7 days of discharge.      Follow-Up Appointment Instructions     Adult [facility] Follow-up and recommended labs and tests       Follow up with primary care provider, Jerrod Graves, within 7 days for hospital follow- up.  No follow up labs or test are needed.  Follow up with psychiatry in   2 weeks  Follow up with neuropsychiatry in 2-4 weeks   Follow up with Dr.James Abbott at [facility] memory clinic     Appointments on [facility]/or [facility] (with [facility] or [facility] provider or service). Call [phone] if you haven't heard regarding these appointments within 7 days of discharge.             Statement of Approval     Ordered          02/06/18 1426  I have reviewed and agree with all the recommendations and orders detailed in this document.  EFFECTIVE NOW     Approved and electronically signed by:   Grady Mccall MD

## 2018-01-26 NOTE — IP AVS SNAPSHOT
"          UR 8A: [phone]                                              INTERAGENCY TRANSFER FORM - LAB / IMAGING / EKG / EMG RESULTS   2018                    Hospital Admission Date: 2018  EMILIA ALMARAZ   : 1940  Sex: Male        Attending Provider: Grady Mccall MD     Allergies:  No Known Allergies    Infection:  None   Service:  INTERNAL MED    Ht:  1.651 m (5' 5\")   Wt:  77.3 kg (170 lb 6.4 oz)   Admission Wt:  73.9 kg (163 lb)    BMI:  28.36 kg/m 2   BSA:  1.88 m 2            Patient PCP Information     Provider PCP Type    Jerrod Graves MD General         Lab Results - 3 Days      Basic metabolic panel [[id number]] (Abnormal)  Resulted: 18 0548, Result status: Final result    Ordering provider: Jose Raul Santamaria MD  18 0001 Resulting lab: [facility]    Specimen Information    Type Source Collected On   Blood  18 0523          Components       Value Reference Range Flag Lab   Sodium 145 133 - 144 mmol/L H 13   Potassium 4.3 3.4 - 5.3 mmol/L  13   Chloride 110 94 - 109 mmol/L H 13   Carbon Dioxide 28 20 - 32 mmol/L  13   Anion Gap 7 3 - 14 mmol/L  13   Glucose 91 70 - 99 mg/dL  13   Urea Nitrogen 13 7 - 30 mg/dL  13   Creatinine 1.03 0.66 - 1.25 mg/dL  13   GFR Estimate 70 >60 mL/min/1.7m2  13   Comment:  Non  GFR Calc   GFR Estimate If Black 84 >60 mL/min/1.7m2  13   Comment:  African American GFR Calc   Calcium 8.0 8.5 - 10.1 mg/dL L 13            Sodium [[id number]] (Abnormal)  Resulted: 18 0550, Result status: Final result    Ordering provider: Jose Raul Santamaria MD  18 0000 Resulting lab: [facility]    Specimen Information    Type Source Collected On   Blood  18 0527          Components       Value Reference Range Flag Lab   Sodium 146 133 - 144 mmol/L H 13            Basic metabolic panel [[id number]] (Abnormal)  Resulted: 18 0701, Result status: Final result    " Ordering provider: Denilson Edmondson MD  02/04/18 0000 Resulting lab: [facility]    Specimen Information    Type Source Collected On   Blood  02/04/18 0626          Components       Value Reference Range Flag Lab   Sodium 146 133 - 144 mmol/L H 13   Potassium 3.9 3.4 - 5.3 mmol/L  13   Chloride 114 94 - 109 mmol/L H 13   Carbon Dioxide 26 20 - 32 mmol/L  13   Anion Gap 6 3 - 14 mmol/L  13   Glucose 96 70 - 99 mg/dL  13   Urea Nitrogen 16 7 - 30 mg/dL  13   Creatinine 0.96 0.66 - 1.25 mg/dL  13   GFR Estimate 76 >60 mL/min/1.7m2  13   Comment:  Non  GFR Calc   GFR Estimate If Black >90 >60 mL/min/1.7m2  13   Comment:  African American GFR Calc   Calcium 8.1 8.5 - 10.1 mg/dL L 13            CBC with platelets [[id number]] (Abnormal)  Resulted: 02/04/18 0647, Result status: Final result    Ordering provider: Denilson Edmondson MD  02/04/18 0000 Resulting lab: [facility]    Specimen Information    Type Source Collected On   Blood  02/04/18 0626          Components       Value Reference Range Flag Lab   WBC 9.0 4.0 - 11.0 10e9/L  13   RBC Count 4.16 4.4 - 5.9 10e12/L L 13   Hemoglobin 10.9 13.3 - 17.7 g/dL L 13   Hematocrit 35.8 40.0 - 53.0 % L 13   MCV 86 78 - 100 fl  13   MCH 26.2 26.5 - 33.0 pg L 13   MCHC 30.4 31.5 - 36.5 g/dL L 13   RDW 16.4 10.0 - 15.0 % H 13   Platelet Count 282 150 - 450 10e9/L  13            Basic metabolic panel [[id number]] (Abnormal)  Resulted: 02/03/18 0620, Result status: Final result    Ordering provider: Denilson Edmondson MD  02/03/18 0000 Resulting lab: [facility]    Specimen Information    Type Source Collected On   Blood  02/03/18 0542          Components       Value Reference Range Flag Lab   Sodium 144 133 - 144 mmol/L  13   Potassium 4.1 3.4 - 5.3 mmol/L  13   Chloride 110 94 - 109 mmol/L H 13   Carbon Dioxide 29 20 - 32 mmol/L  13   Anion Gap 5 3 - 14 mmol/L  13   Glucose 100 70 - 99  "mg/dL H 13   Urea Nitrogen 17 7 - 30 mg/dL  13   Creatinine 1.04 0.66 - 1.25 mg/dL  13   GFR Estimate 69 >60 mL/min/1.7m2  13   Comment:  Non  GFR Calc   GFR Estimate If Black 83 >60 mL/min/1.7m2  13   Comment:  African American GFR Calc   Calcium 8.5 8.5 - 10.1 mg/dL  13            CBC with platelets [[id number]] (Abnormal)  Resulted: 02/03/18 0608, Result status: Final result    Ordering provider: Denilson Edmondson MD  02/03/18 0000 Resulting lab: [facility]    Specimen Information    Type Source Collected On   Blood  02/03/18 0542          Components       Value Reference Range Flag Lab   WBC 8.9 4.0 - 11.0 10e9/L  13   RBC Count 4.64 4.4 - 5.9 10e12/L  13   Hemoglobin 11.9 13.3 - 17.7 g/dL L 13   Hematocrit 40.0 40.0 - 53.0 %  13   MCV 86 78 - 100 fl  13   MCH 25.6 26.5 - 33.0 pg L 13   MCHC 29.8 31.5 - 36.5 g/dL L 13   RDW 16.2 10.0 - 15.0 % H 13   Platelet Count 339 150 - 450 10e9/L  13            Testing Performed By     Lab - Abbreviation Name Director Address Valid Date Range    13 - Unknown [facility] Unknown [address] [facility] [address] 01/15/15 0916 - Present            Unresulted Labs (24h ago through future)    Start       Ordered    Unscheduled  Potassium  (Potassium Replacement - \"Standard\" - For K levels less than 3.4 mmol/L - UU,UR,UA,RH,[facility],[place] )  CONDITIONAL (SPECIFY),   Routine     Comments:  Obtain Potassium Level for these conditions:  *IF no potassium result within 24 hours before initiation of order set, draw potassium level with next lab collect.    *2 HOURS AFTER last IV potassium replacement dose and 4 hours after an oral replacement dose.  *Next morning after potassium dose.     Repeat Potassium Replacement if necessary.    01/27/18 1209      Encounter-Level Documents:     There are no encounter-level documents.      Order-Level Documents:     There are no order-level documents.      "

## 2018-01-26 NOTE — LETTER
Transition Communication Hand-off for Care Transitions to Next Level of Care Provider    Name: Deion Diaz  MRN #: [id number]  Primary Care Provider: Jerrod Graves     Primary Clinic: [facility] [address]  [facility] [address]     Reason for Hospitalization:  Visual hallucination [R44.1]  Hallucinations of tactile sensation [R44.2]  Admit Date/Time: 1/26/2018  8:53 PM  Discharge Date: 2/6/2018  Payor Source: Payor: MEDICARE / Plan: MEDICARE / Product Type: Medicare /          Reason for Communication Hand-off Referral: Avoidable readmission within 30 days    Discharge Plan:  Discharge Plan:       Most Recent Value    Concerns To Be Addressed home safety concerns, cognitive/perceptual concerns         Discharge Needs Assessment:  Needs       Most Recent Value    Equipment Currently Used at Home walker, rolling    Transportation Available family or friend will provide        Follow-up specialty is recommended: Yes    Follow-up plan:  Future Appointments  Date Time Provider Department Center   2/7/2018 11:00 AM Kiya Whitlock Pt, PT [facility] RN, BSN  Care Coordinator,   Phone [phone]  Pager [phone]          AVS/Discharge Summary is the source of truth; this is a helpful guide for improved communication of patient story

## 2018-01-26 NOTE — IP AVS SNAPSHOT
` `     UR 8A: [phone]            Medication Administration Report for Deion Diaz as of 02/06/18 1447   Legend:    Given Hold Not Given Due Canceled Entry Other Actions    Time Time (Time) Time  Time-Action       Inactive    Active    Linked        Medications 01/31/18 02/01/18 02/02/18 02/03/18 02/04/18 02/05/18 02/06/18    acetaminophen (TYLENOL) tablet 650 mg  Dose: 650 mg  Freq: EVERY 4 HOURS PRN Route: PO  PRN Reasons: mild pain,fever  Start: 02/02/18 1330   Admin Instructions: Maximum acetaminophen dose from all sources = 75 mg/kg/day not to exceed 4 grams/day.       2324 (650 mg)-Given        0416 (650 mg)-Given       2343 (650 mg)-Given        2154 (650 mg)-Given        0644 (650 mg)-Given       1523 (650 mg)-Given        0015 (650 mg)-Given           albuterol (PROAIR HFA/PROVENTIL HFA/VENTOLIN HFA) Inhaler 2 puff  Dose: 2 puff  Freq: EVERY 4 HOURS PRN Route: IN  PRN Reasons: wheezing,shortness of breath / dyspnea  Start: 01/27/18 0551              albuterol (PROVENTIL) neb solution 2.5 mg  Dose: 2.5 mg  Freq: EVERY 2 HOURS PRN Route: NEBULIZATION  PRN Reasons: wheezing,shortness of breath / dyspnea  Start: 01/30/18 1630              allopurinol (ZYLOPRIM) tablet 100 mg  Dose: 100 mg  Freq: DAILY Route: PO  Start: 01/27/18 0800    0930 (100 mg)-Given        0859 (100 mg)-Given        0945 (100 mg)-Given        0818 (100 mg)-Given        0848 (100 mg)-Given        0848 (100 mg)-Given        0907 (100 mg)-Given           aspirin EC EC tablet 81 mg  Dose: 81 mg  Freq: DAILY Route: PO  Start: 01/28/18 1045   Admin Instructions: DO NOT CRUSH.     0928 (81 mg)-Given        0859 (81 mg)-Given        0945 (81 mg)-Given        0818 (81 mg)-Given        0848 (81 mg)-Given        0848 (81 mg)-Given        0906 (81 mg)-Given           atenolol (TENORMIN) tablet 50 mg  Dose: 50 mg  Freq: DAILY Route: PO  Start: 01/27/18 0800   Admin Instructions: Hold for SBP <120 or HR <60     0928 (50 mg)-Given        0859  (50 mg)-Given        (0947)-Not Given [C]        (0819)-Not Given [C]        (0847)-Not Given        0848 (50 mg)-Given        0907 (50 mg)-Given           atorvastatin (LIPITOR) tablet 20 mg  Dose: 20 mg  Freq: DAILY AT 8PM Route: PO  Start: 01/27/18 2000    2117 (20 mg)-Given        2119 (20 mg)-Given        1956 (20 mg)-Given        2001 (20 mg)-Given        1937 (20 mg)-Given        2016 (20 mg)-Given        [ ] 2000           clindamycin (CLEOCIN) capsule 300 mg  Dose: 300 mg  Freq: 2 TIMES DAILY Route: PO  Indications Comment: hip infection suppression  Start: 01/27/18 0800    0929 (300 mg)-Given       2117 (300 mg)-Given        0859 (300 mg)-Given       2119 (300 mg)-Given        0945 (300 mg)-Given       1956 (300 mg)-Given        0818 (300 mg)-Given       2001 (300 mg)-Given        0847 (300 mg)-Given       1937 (300 mg)-Given        0848 (300 mg)-Given       2016 (300 mg)-Given        0907 (300 mg)-Given       [ ] 2000           Contraindications to both pharmacological and mechanical prophylaxis (must document contraindications for both in this order)  Freq: CONTINUOUS PRN Route: XX  Start: 01/27/18 0551   Admin Instructions: Contraindications to both pharmacological and mechanical prophylaxis (must document contraindications for both in this order)    Order specific questions:  Contraindication to pharmacological prophylaxis High bleeding risk  Contraindication to mechanical prophylaxis Other (please detail in the administration instructions)                fluticasone (FLONASE) 50 MCG/ACT spray 2 spray  Dose: 2 spray  Freq: DAILY Route: BOTH NOSTRIL  Start: 01/27/18 0800    0935 (2 spray)-Given        0858 (2 spray)-Given        0947 (2 spray)-Given        0818 (2 spray)-Given        0850 (2 spray)-Given        0847 (2 spray)-Given        0911 (2 spray)-Given           fluticasone-vilanterol (BREO ELLIPTA) 200-25 MCG/INH oral inhaler 1 puff  Dose: 1 puff  Freq: DAILY Route: IN  Start: 01/27/18 0600  "  Admin Instructions: *Do not use more frequently than once daily.*  Rinse mouth after use.<br>Formulary alternate for Advair 500mcg-50mcg (patient's home med)     0935 (1 puff)-Given        0903 (1 puff)-Given        0946 (1 puff)-Given        0818 (1 puff)-Given        0849 (1 puff)-Given        0843 (1 puff)-Given        0911 (1 puff)-Given           furosemide (LASIX) tablet 20 mg  Dose: 20 mg  Freq: HOLD Route: PO  PRN Comment: until resumed  Start: 02/01/18 1415              hypromellose-dextran (ARTIFICAL TEARS) ophthalmic solution 1 drop  Dose: 1 drop  Freq: EVERY 1 HOUR PRN Route: Both Eyes  PRN Reason: dry eyes  Start: 01/27/18 1229              ipratropium - albuterol 0.5 mg/2.5 mg/3 mL (DUONEB) neb solution 3 mL  Dose: 3 mL  Freq: EVERY 4 HOURS PRN Route: NEBULIZATION  PRN Reasons: wheezing,shortness of breath / dyspnea  Start: 01/27/18 1151        1108 (3 mL)-Given             lidocaine (LMX4) kit  Freq: EVERY 1 HOUR PRN Route: Top  PRN Reason: pain  PRN Comment: with VAD insertion or accessing implanted port.  Start: 01/27/18 0551   Admin Instructions: Do NOT give if patient has a history of allergy to any local anesthetic or any \"vazquez\" product.   Apply 30 minutes prior to VAD insertion or port access.  MAX Dose:  2.5 g (  of 5 g tube)               lidocaine 1 % 1 mL  Dose: 1 mL  Freq: EVERY 1 HOUR PRN Route: OTHER  PRN Comment: mild pain with VAD insertion or accessing implanted port  Start: 01/27/18 0551   Admin Instructions: Do NOT give if patient has a history of allergy to any local anesthetic or any \"vazquez\" product. MAX dose 1 mL subcutaneous OR intradermal in divided doses.               melatonin tablet 1 mg  Dose: 1 mg  Freq: AT BEDTIME PRN Route: PO  PRN Reason: sleep  Start: 01/27/18 0551   Admin Instructions: Do not give unless at least 6 hours of uninterrupted sleep is expected.               metoclopramide (REGLAN) tablet 5 mg  Dose: 5 mg  Freq: EVERY 6 HOURS PRN Route: PO  PRN Comment: " nausea and vomiting  Start: 02/04/18 2025   Admin Instructions: This is Step 3 of nausea and vomiting management.  Give if nausea not resolved 15 minutes after giving prochlorperazine (COMPAZINE).  If nausea not resolved in 15-30 minutes, Notify provider.              Or  metoclopramide (REGLAN) injection 5 mg  Dose: 5 mg  Freq: EVERY 6 HOURS PRN Route: IV  PRN Comment: nausea and vomiting  Start: 02/04/18 2025   Admin Instructions: This is Step 3 of nausea and vomiting management.  Give if nausea not resolved 15 minutes after giving prochlorperazine (COMPAZINE).  If nausea not resolved in 15-30 minutes, Notify provider.  Avoid use if patient has full bowel obstruction or perforation. Irritant. For ordered doses up to 10 mg, give IV Push undiluted over 2 minutes.               multivitamin, therapeutic with minerals (THERA-VIT-M) tablet 1 tablet  Dose: 1 tablet  Freq: DAILY Route: PO  Start: 01/27/18 0800    0929 (1 tablet)-Given        0859 (1 tablet)-Given        0945 (1 tablet)-Given        0818 (1 tablet)-Given        0847 (1 tablet)-Given        0847 (1 tablet)-Given        0907 (1 tablet)-Given           naloxone (NARCAN) injection 0.1-0.4 mg  Dose: 0.1-0.4 mg  Freq: EVERY 2 MIN PRN Route: IV  PRN Reason: opioid reversal  Start: 01/27/18 0551   Admin Instructions: For respiratory rate LESS than or EQUAL to 8.  Partial reversal dose:  0.1 mg titrated q 2 minutes for Analgesia Side Effects Monitoring Sedation Level of 3 (frequently drowsy, arousable, drifts to sleep during conversation).Full reversal dose:  0.4 mg bolus for Analgesia Side Effects Monitoring Sedation Level of 4 (somnolent, minimal or no response to stimulation).  For ordered doses up to 2mg give IVP. Give each 0.4mg over 15 seconds in emergency situations. For non-emergent situations further dilute in 9mL of NS to facilitate titration of response.               ondansetron (ZOFRAN-ODT) ODT tab 4 mg  Dose: 4 mg  Freq: EVERY 6 HOURS PRN Route:  PO  PRN Reasons: nausea,vomiting  Start: 02/04/18 2025   Admin Instructions: This is Step 1 of nausea and vomiting management.  If nausea not resolved in 15 minutes, go to Step 2 prochlorperazine (COMPAZINE). Do not push through foil backing. Peel back foil and gently remove. Place on tongue immediately. Administration with liquid unnecessary  With dry hands, peel back foil backing and gently remove tablet; do not push oral disintegrating tablet through foil backing; administer immediately on tongue and oral disintegrating tablet dissolves in seconds; then swallow with saliva; liquid not required.         2035 (4 mg)-Given            Or  ondansetron (ZOFRAN) injection 4 mg  Dose: 4 mg  Freq: EVERY 6 HOURS PRN Route: IV  PRN Reasons: nausea,vomiting  Start: 02/04/18 2025   Admin Instructions: This is Step 1 of nausea and vomiting management.  If nausea not resolved in 15 minutes, go to Step 2 prochlorperazine (COMPAZINE).  Irritant. For ordered doses up to 4 mg, give IV Push undiluted over 2-5 minutes.                      polyethylene glycol (MIRALAX/GLYCOLAX) Packet 17 g  Dose: 17 g  Freq: DAILY PRN Route: PO  PRN Reason: constipation  Start: 01/27/18 0551   Admin Instructions: Give in 8oz of  water, juice, or soda. Hold for loose stools.  This is the second step of a three step constipation treatment.  1 Packet = 17 grams. Mixed prescribed dose in 8 ounces of water. Follow with 8 oz. of water.               potassium chloride (KLOR-CON) Packet 20-40 mEq  Dose: 20-40 mEq  Freq: EVERY 2 HOURS PRN Route: ORAL OR FEED  PRN Reason: potassium supplementation  Start: 01/27/18 1209   Admin Instructions: Use if unable to tolerate tablets.  If Serum K+ 3.0-3.3, dose = 60 mEq po total dose (40 mEq x1 followed in 2 hours by 20 mEq x1). Recheck K+ level 4 hours after dose and the next AM.  If Serum K+ 2.5-2.9, dose = 80 mEq po total dose (40 mEq Q2H x2). Recheck K+ level 4 hours after dose and the next AM.  If Serum K+ less  than 2.5, See IV order.  Dissolve packet contents in 4-8 ounces of cold water or juice.               potassium chloride 10 mEq in 100 mL intermittent infusion with 10 mg lidocaine  Dose: 10 mEq  Freq: EVERY 1 HOUR PRN Route: IV  PRN Reason: potassium supplementation  Start: 01/27/18 1209   Admin Instructions: Infuse via PERIPHERAL LINE. Use potassium with lidocaine for pain with peripheral administration.  If Serum K+ 3.0-3.3, dose = 10 mEq/hr x4 doses (40 mEq IV total dose). Recheck K+ level 2 hours after dose and the next AM.  If Serum K+ less than 3.0, dose = 10 mEq/hr x6 doses (60 mEq IV total dose). Recheck K+ level 2 hours after dose and the next AM.               potassium chloride 10 mEq in 100 mL sterile water intermittent infusion (premix)  Dose: 10 mEq  Freq: EVERY 1 HOUR PRN Route: IV  PRN Reason: potassium supplementation  Start: 01/27/18 1209   Admin Instructions: Infuse via PERIPHERAL LINE or CENTRAL LINE. Use for central line replacement if patient weight less than 65 kg, if patient is on TPN with high potassium content or if unit does not stock 20 mEq bags.   If Serum K+ 3.0-3.3, dose = 10 mEq/hr x4 doses (40 mEq IV total dose). Recheck K+ level 2 hours after dose and the next AM.   If Serum K+ less than 3.0, dose = 10 mEq/hr x6 doses (60 mEq IV total dose). Recheck K+ level 2 hours after dose and the next AM.               potassium chloride 20 mEq in 50 mL intermittent infusion  Dose: 20 mEq  Freq: EVERY 1 HOUR PRN Route: IV  PRN Reason: potassium supplementation  Start: 01/27/18 1209   Admin Instructions: Infuse via CENTRAL LINE Only. May need EKG if less than 65 kg or on TPN - Max rate is 0.3 mEq/kg/hr for patients not on EKG monitoring.   If Serum K+ 3.0-3.3, dose = 20 mEq/hr x2 doses (40 mEq IV total dose). Recheck K+ level 2 hours after dose and the next AM.  If Serum K+ less than 3.0, dose = 20 mEq/hr x3 doses (60 mEq IV total dose). Recheck K+ level 2 hours after dose and the next AM.                potassium chloride SA (K-DUR/KLOR-CON M) CR tablet 20-40 mEq  Dose: 20-40 mEq  Freq: EVERY 2 HOURS PRN Route: PO  PRN Reason: potassium supplementation  Start: 01/27/18 1209   Admin Instructions: Use if able to take PO.   If Serum K+ 3.0-3.3, dose = 60 mEq po total dose (40 mEq x1 followed in 2 hours by 20 mEq x1). Recheck K+ level 4 hours after dose and the next AM.  If Serum K+ 2.5-2.9, dose = 80 mEq po total dose (40 mEq Q2H x2). Recheck K+ level 4 hours after dose and the next AM.  If Serum K+ less than 2.5, See IV order.  DO NOT CRUSH.               prochlorperazine (COMPAZINE) injection 5 mg  Dose: 5 mg  Freq: EVERY 6 HOURS PRN Route: IV  PRN Reasons: nausea,vomiting  Start: 02/04/18 2025   Admin Instructions: This is Step 2 of nausea and vomiting management. Give if nausea not resolved 15 minutes after giving ondansetron (ZOFRAN). If nausea not resolved in 15 minutes, go to Step 3 metoclopramide (REGLAN), if ordered.  For ordered doses up to 10 mg, give IV Push undiluted. Each 5mg over 1 minute.              Or  prochlorperazine (COMPAZINE) tablet 5 mg  Dose: 5 mg  Freq: EVERY 6 HOURS PRN Route: PO  PRN Reason: vomiting  Start: 02/04/18 2025   Admin Instructions: This is Step 2 of nausea and vomiting management. Give if nausea not resolved 15 minutes after giving ondansetron (ZOFRAN). If nausea not resolved in 15 minutes, go to Step 3 metoclopramide (REGLAN), if ordered.              Or  prochlorperazine (COMPAZINE) Suppository 12.5 mg  Dose: 12.5 mg  Freq: EVERY 12 HOURS PRN Route: RE  PRN Reasons: nausea,vomiting  Start: 02/04/18 2025   Admin Instructions: This is Step 2 of nausea and vomiting management. Give if nausea not resolved 15 minutes after giving ondansetron (ZOFRAN). If nausea not resolved in 15 minutes, go to Step 3 metoclopramide (REGLAN), if ordered.               rivastigmine (EXELON) 4.6 MG/24HR 24 hr patch 1 patch  Dose: 1 patch  Freq: DAILY Route: TD  Start: 01/30/18 [id number]  (1 patch)-Given        1216 (1 patch)-Given        0945 (1 patch)-Given        0818 (1 patch)-Given        0850 (1 patch)-Given        0844 (1 patch)-Given        0911 (1 patch)-Given           rivastigmine (EXELON) Patch in Place  Freq: EVERY 8 HOURS Route: TD  Start: 01/30/18 1415   Admin Instructions: Chart every shift, confirming that patch is still in place on patient (no barcode scan needed). See patch order for dose information.     0743 ( )-Patch in Place       1404 ( )-Patch in Place       2334 ( )-Patch in Place        0643 ( )-Patch in Place       1437 (1 each)-Patch in Place       2119 ( )-Patch in Place        0552 ( )-Patch in Place       1449 ( )-Patch in Place [C]       2130 ( )-Patch in Place        0608 ( )-Patch in Place       1437 (1 each)-Given [C]       2122 ( )-Patch in Place [C]        0515 ( )-Patch in Place       1454 ( )-Patch in Place       2154 ( )-Patch in Place        0530 ( )-Patch in Place       1410 ( )-Positive [C]       2214 ( )-Patch in Place               [ ] 1415       [ ] 2215           rivastigmine (EXELON) patch REMOVAL  Freq: DAILY Route: TD  Start: 01/31/18 0800   Admin Instructions: Remove patch when new patch is applied or patch is discontinued.     0931 ( )-Patch Removed        0903 ( )-Patch Removed        0947 ( )-Patch Removed        0818 ( )-Patch Removed        0852 ( )-Patch Removed [C]        0850 ( )-Positive [C]        0914 ( )-Patch Removed           senna-docusate (SENOKOT-S;PERICOLACE) 8.6-50 MG per tablet 1 tablet  Dose: 1 tablet  Freq: 2 TIMES DAILY PRN Route: PO  PRN Reason: constipation  Start: 01/27/18 0551   Admin Instructions: If no bowel movement in 24 hours, increase to 2 tablets PO.  Hold for loose stools.              Or  senna-docusate (SENOKOT-S;PERICOLACE) 8.6-50 MG per tablet 2 tablet  Dose: 2 tablet  Freq: 2 TIMES DAILY PRN Route: PO  PRN Reason: constipation  Start: 01/27/18 0551   Admin Instructions: Hold for loose stools.                sodium chloride (PF) 0.9% PF flush 3 mL  Dose: 3 mL  Freq: EVERY 8 HOURS Route: IK  Start: 01/27/18 0600   Admin Instructions: And Q1H PRN, to lock peripheral IV dormant line.            (0931)-Not Given       (1613)-Not Given               (0906)-Not Given       (1616)-Not Given               (0947)-Not Given       (1559)-Not Given [C]        (0001)-Not Given       (0819)-Not Given       (1636)-Not Given       (2344)-Not Given        (0852)-Not Given [C]       (1718)-Not Given               (0850)-Not Given       (1719)-Not Given [C]                      [ ] 1600           sodium chloride (PF) 0.9% PF flush 3 mL  Dose: 3 mL  Freq: EVERY 1 HOUR PRN Route: IK  PRN Reason: line flush  PRN Comment: for peripheral IV flush post IV meds  Start: 01/27/18 0551              tamsulosin (FLOMAX) capsule 0.4 mg  Dose: 0.4 mg  Freq: AT BEDTIME Route: PO  Start: 01/27/18 2200    2117 (0.4 mg)-Given        2119 (0.4 mg)-Given        2132 (0.4 mg)-Given        2121 (0.4 mg)-Given        2154 (0.4 mg)-Given        2137 (0.4 mg)-Given        [ ] 2200        Medications 01/31/18 02/01/18 02/02/18 02/03/18 02/04/18 02/05/18 02/06/18

## 2018-01-26 NOTE — IP AVS SNAPSHOT
Deion Almaraz #[id number] (CSN: [id number])  (77 year old M)  (Adm: 18)     [id number]               UR 8A: [phone]            Patient Demographics     Patient Name Sex          Age SSN Address Phone    Deion Almaraz Male 1940 (77 year old) [id number]   [facility] [address] [phone] (Home)  none (Work)  none (Mobile)      Emergency Contact(s)     Name Relation Home Work Mobile    DEION ALMARAZ Son [phone]      Mackenzie Almaraz Daughter [phone]      Admission Information     Attending Provider Admitting Provider Admission Type Admission Date/Time    Grady Mccall MD Dahal, Pradeep, MD Emergency 18    Discharge Date Hospital Service Auth/Cert Status Service Area     Internal Medicine Incomplete [facility]    Unit Room/Bed Admission Status       UR   Admission (Confirmed)       Admission     Complaint    Hallucinations      Hospital Account     Name Acct ID Class Status Primary Coverage    Deion Almaraz [id number] Inpatient Open MEDICARE - MEDICARE            Guarantor Account (for Hospital Account #[id number])     Name Relation to Pt Service Area Active? Acct Type    Deion Almaraz Self FCS Yes Personal/Family    Address Phone          [address] [place] [address] [phone](H)  none(O)              Coverage Information (for Hospital Account #[id number])     1. MEDICARE/MEDICARE     F/O Payor/Plan Precert #    MEDICARE/MEDICARE     Subscriber Subscriber #    Deion Almaraz [id number]    Address Phone    ATTN CLAIMS  [address]  [facility] [place] [address] [phone]          2. COMMERCIAL/[employer] BENEFIT PLAN     F/O Payor/Plan Precert #    COMMERCIAL/[employer] BENEFIT PLAN     Subscriber Subscriber #    Deion Almaraz [id number]    Address Phone    [address]  [place] [address] [phone]                                                       "INTERAGENCY TRANSFER FORM - PHYSICIAN ORDERS   1/26/2018                       UR 8A: [phone]            Attending Provider: Grady Mccall MD     Allergies:  No Known Allergies    Infection:  None   Service:  INTERNAL MED    Ht:  1.651 m (5' 5\")   Wt:  77.3 kg (170 lb 6.4 oz)   Admission Wt:  73.9 kg (163 lb)    BMI:  28.36 kg/m 2   BSA:  1.88 m 2            ED Clinical Impression     Diagnosis Description Comment Added By Time Added    Hallucinations of tactile sensation [R44.2] Hallucinations of tactile sensation [R44.2]  Chris Diaz MD 1/26/2018 11:24 PM    Visual hallucination [R44.1] Visual hallucination [R44.1]  Chris Diaz MD 1/26/2018 11:24 PM      Hospital Problems as of 2/6/2018              Priority Class Noted POA    Hallucinations Medium  1/27/2018 Yes      Non-Hospital Problems as of 2/6/2018              Priority Class Noted    Failure to thrive in adult Medium  5/31/2017    Hallucination, drug-induced (H) Medium  6/2/2017      Code Status History     Date Active Date Inactive Code Status Order ID Comments User Context    2/6/2018  2:25 PM  Full Code [id number]  Grady Mccall MD Outpatient    1/27/2018  5:51 AM 2/6/2018  2:25 PM Full Code [id number]  Ayo Chaidez MD Inpatient    6/3/2017 11:17 AM 1/27/2018  5:51 AM Full Code [id number]  Ciera Mccauley MD Outpatient    5/31/2017  7:32 PM 6/3/2017 11:17 AM Full Code [id number]  Jay Ovalle MD Inpatient      Current Code Status     Date Active Code Status Order ID Comments User Context       Prior      Summary of Visit     Reason for your hospital stay       Hallucinations                Medication Review      START taking        Dose / Directions Comments    aspirin 81 MG EC tablet   Used for:  PVD (peripheral vascular disease) (H)        Dose:  81 mg   Start taking on:  2/7/2018   Take 1 tablet (81 mg) by mouth daily   Quantity:  30 tablet   Refills:  0        clindamycin 300 MG capsule   Commonly known as:  CLEOCIN "   Indication:  hip infection suppression   Used for:  Infection of prosthetic hip joint, subsequent encounter        Dose:  300 mg   Take 1 capsule (300 mg) by mouth 2 times daily   Quantity:  60 capsule   Refills:  1        melatonin 1 MG Tabs tablet   Used for:  Insomnia, unspecified type        Dose:  1 mg   Take 1 tablet (1 mg) by mouth nightly as needed for sleep   Quantity:  30 tablet   Refills:  1        rivastigmine 4.6 MG/24HR 24 hr patch   Commonly known as:  EXELON   Used for:  Hallucinations of tactile sensation        Dose:  1 patch   Start taking on:  2/7/2018   Place 1 patch onto the skin daily   Quantity:  30 patch   Refills:  1          CONTINUE these medications which have NOT CHANGED        Dose / Directions Comments    acetaminophen 325 MG tablet   Commonly known as:  TYLENOL   Used for:  Other chronic pain        Dose:  650 mg   Take 2 tablets (650 mg) by mouth 3 times daily   Quantity:  100 tablet   Refills:  0        albuterol 108 (90 BASE) MCG/ACT Inhaler   Commonly known as:  PROAIR HFA/PROVENTIL HFA/VENTOLIN HFA        Dose:  2 puff   Inhale 2 puffs into the lungs every 6 hours as needed   Refills:  0        ALLOPURINOL PO        Dose:  100 mg   Take 100 mg by mouth daily   Refills:  0        ATENOLOL PO        Dose:  50 mg   Take 50 mg by mouth daily   Refills:  0        ATORVASTATIN CALCIUM PO        Dose:  20 mg   Take 20 mg by mouth daily   Refills:  0        fluticasone-salmeterol 500-50 MCG/DOSE diskus inhaler   Commonly known as:  ADVAIR        Dose:  1 puff   Inhale 1 puff into the lungs every 12 hours   Refills:  0        Multi-vitamin Tabs tablet        Dose:  1 tablet   Take 1 tablet by mouth daily   Refills:  0        NITROGLYCERIN SL        Dose:  0.4 mg   Place 0.4 mg under the tongue   Refills:  0        TAMSULOSIN HCL PO        Dose:  0.4 mg   Take 0.4 mg by mouth At Bedtime   Refills:  0          STOP taking     buPROPion 150 MG 24 hr tablet   Commonly known as:  WELLBUTRIN  "XL           furosemide 40 MG tablet   Commonly known as:  LASIX           gabapentin 300 MG capsule   Commonly known as:  NEURONTIN           HYDROcodone-acetaminophen 5-325 MG per tablet   Commonly known as:  NORCO           NONFORMULARY           ROLAIDS PO                   After Care     Activity       Your activity upon discharge: activity as tolerated       Diet       Follow this diet upon discharge: 2g salt             Follow-Up Appointment Instructions     Adult [facility] Follow-up and recommended labs and tests       Follow up with primary care provider, Jerrod Graves, within 7 days for hospital follow- up.  No follow up labs or test are needed.  Follow up with psychiatry in   2 weeks  Follow up with neuropsychiatry in 2-4 weeks   Follow up with Dr.James Abbott at [facility] memory clinic     Appointments on [place] and/or [facility] (with [facility] or [facility] provider or service). Call [phone] if you haven't heard regarding these appointments within 7 days of discharge.             Statement of Approval     Ordered          02/06/18 1426  I have reviewed and agree with all the recommendations and orders detailed in this document.  EFFECTIVE NOW     Approved and electronically signed by:  Grady Mccall MD                                                 INTERAGENCY TRANSFER FORM - NURSING   1/26/2018                       UR 8A: [phone]            Attending Provider: Grady Mccall MD     Allergies:  No Known Allergies    Infection:  None   Service:  INTERNAL MED    Ht:  1.651 m (5' 5\")   Wt:  77.3 kg (170 lb 6.4 oz)   Admission Wt:  73.9 kg (163 lb)    BMI:  28.36 kg/m 2   BSA:  1.88 m 2            Advance Directives        Scanned docmt in ACP Activity?           Yes, scanned ACP docmt        Immunizations     None      ASSESSMENT     Discharge Profile Flowsheet     EXPECTED DISCHARGE     SKIN      Expected Discharge Date  01/31/18 01/30/18 1521   Inspection of bony prominences  Full except " (identify areas not inspected) 02/06/18 1219    DISCHARGE NEEDS ASSESSMENT     Full except areas not inspected   Elbow, left;Elbow, right;Hip, left;Hip, right;Buttock, right;Buttock, left;Sacrum;Coccyx;Heel, left;Heel, right;Foot, left;Foot, right 02/06/18 1219    Concerns To Be Addressed  home safety concerns;cognitive/perceptual concerns 01/30/18 1521   Skin WDL  ex 02/06/18 1219    Patient/family verbalizes understanding of discharge plan recommendations?  Yes 01/30/18 1521   Skin Color/Characteristics  kaila 02/06/18 1219    Medical Team notified of plan?  yes 01/30/18 1521   Skin Temperature  warm 02/06/18 1219    Equipment Currently Used at Home  walker, rolling 01/29/18 1050   Skin Moisture  dry 02/06/18 1219    Transportation Available  family or friend will provide 01/29/18 1050   Skin Elasticity  quick return to original state 02/06/18 0228    # of Referrals Placed by CTS  Post Acute Facilities;Transportation 06/03/17 1033   Skin Integrity  abrasion(s);bruise(s);scab(s);scar(s) (bilateral arms & legs) 02/06/18 0228    GASTROINTESTINAL (ADULT,PEDIATRIC,OB)     Inspection under devices  Full except (identify device(s) not inspected) (UTV under dressings) 02/06/18 0228    GI WDL  WDL 02/06/18 1219   Not Inspected under devices.  -- (UTV under mepilex dressings to BLE) 02/04/18 0040    Last Bowel Movement  02/06/18 02/06/18 1219   Focused inspection under devices  CPAP mask 02/05/18 1010    Passing flatus  yes 02/06/18 1219   SAFETY      COMMUNICATION ASSESSMENT     Safety WDL  WDL 02/06/18 1219    Patient's communication style  spoken language (English or Bilingual) 01/26/18 1707   Safety Factors  wheels locked;ID band on;call light in reach;upper side rails raised x 2 02/06/18 1219    FINAL RESOURCES     All Alarms  alarm(s) activated and audible (chair alarm bed alarm.) 02/06/18 1219    Referrals Placed  Post Acute Facilities;Transportation 06/03/17 1033                      Assessment WDL (Within Defined  "Limits) Definitions           Safety WDL     Effective: 09/28/15    Row Information: <b>WDL Definition:</b> Bed in low position, wheels locked; call light in reach; upper side rails up x 2; ID band on<br> <font color=\"gray\"><i>Item=AS safety wdl>>List=AS safety wdl>>Version=F14</i></font>      Skin WDL     Effective: 09/28/15    Row Information: <b>WDL Definition:</b> Warm; dry; intact; elastic; without discoloration; pressure points without redness<br> <font color=\"gray\"><i>Item=AS skin wdl>>List=AS skin wdl>>Version=F14</i></font>      Vitals     Vital Signs Flowsheet     VITAL SIGNS     Change in Pain  about the same 02/06/18 0219    Temp  96.2  F (35.7  C) 02/06/18 0726   Pain Control  partially effective 02/06/18 0219    Temp src  Oral 02/06/18 0726   Functioning  can do everything I need to 02/06/18 0219    Resp  16 02/06/18 0726   Sleep  normal sleep 02/06/18 0219    Pulse  84 02/06/18 0911   ANALGESIA SIDE EFFECTS MONITORING      Heart Rate  81 02/06/18 0726   Side Effects Monitoring: Respiratory Quality  R 02/06/18 0219    Pulse/Heart Rate Source  Monitor 02/06/18 0726   Side Effects Monitoring: Respiratory Depth  N 02/06/18 0219    BP  120/56 02/06/18 0911   Side Effects Monitoring: Sedation Level  1 02/06/18 0219    BP Location  Left arm 02/06/18 0726   HEIGHT AND WEIGHT      OXYGEN THERAPY     Weight  77.3 kg (170 lb 6.4 oz) 02/05/18 0646    SpO2  95 % 02/06/18 0726   Weight Method  Standing scale 01/28/18 1101    O2 Device  None (Room air) 02/06/18 0726   POSITIONING      PAIN/COMFORT     Body Position  other (see comments) (up on chair.) 02/06/18 1326    Patient Currently in Pain  yes 02/06/18 0219   Head of Bed (HOB)  HOB at 20 degrees 02/06/18 0228    Preferred Pain Scale  CAPA (Clinically Aligned Pain Assessment) ([facility] Adults Only) 02/06/18 0219   Positioning/Transfer Devices  pillows;in use 02/06/18 0228    Pain Location  Hip 02/06/18 0219   Chair  Upright in chair 02/04/18 " 1649    Pain Orientation  Left 02/06/18 0219   DAILY CARE      Pain Descriptors  Aching 02/06/18 0219   Activity Management  up in chair 02/06/18 1219    Pain Management Interventions  analgesia administered 02/06/18 0219   Activity Assistance Provided  assistance, 1 person 02/06/18 1219    Pain Intervention(s)  Medication (See eMAR);Repositioned 02/06/18 0219   Assistive Device Utilized  walker 02/06/18 1219    CLINICALLY ALIGNED PAIN ASSESSMENT (CAPA) ([facility] [place] ADULTS ONLY)     Ambulation Distance (Feet)  300 01/31/18 1037    Comfort  tolerable with discomfort 02/06/18 0219                 Patient Lines/Drains/Airways Status    Active LINES/DRAINS/AIRWAYS     None            Patient Lines/Drains/Airways Status    Active PICC/CVC     None            Intake/Output Detail Report     None      Last Void/BM       Most Recent Value    Urine Occurrence 1 at 02/05/2018 0645    Stool Occurrence 1 at 02/03/2018 1930      Case Management/Discharge Planning     Case Management/Discharge Planning Flowsheet     REFERRAL INFORMATION     Patient/family verbalizes understanding of discharge plan recommendations?  Yes 01/30/18 1521    # of Referrals Placed by CTS  Post Acute Facilities;Transportation 06/03/17 1033   Medical Team notified of plan?  yes 01/30/18 1521    LIVING ENVIRONMENT     Transportation Available  family or friend will provide 01/29/18 1050    Lives With  alone 01/29/18 1050   FINAL RESOURCES      Living Arrangements  assisted living 01/29/18 1050   Equipment Currently Used at Home  walker, rolling 01/29/18 1050    COPING/STRESS     Referrals Placed  Post Acute Facilities;Transportation 06/03/17 1033    Major Change/Loss/Stressor  hospitalization;illness;limited support system;loss of independence;medical condition/diagnosis;mental health condition 01/30/18 1521   ABUSE RISK SCREEN      Patient Personal Strengths  expressive of emotions;expressive of needs;able to adapt;assertive;strong support  system;successful coping history 01/30/18 1521   QUESTION TO PATIENT:  Has a member of your family or a partner(now or in the past) intimidated, hurt, manipulated, or controlled you in any way?  no 01/26/18 1716    Sources Of Support  adult child(marlon);friend(s);other family members 01/30/18 1521   QUESTION TO PATIENT: Do you feel safe going back to the place where you are living?  yes 01/26/18 1716    Reaction To Health Status  accepting;adjusting;hopeful 01/30/18 1521   OBSERVATION: Is there reason to believe there has been maltreatment of a vulnerable adult (ie. Physical/Sexual/Emotional abuse, self neglect, lack of adequate food, shelter, medical care, or financial exploitation)?  no 01/26/18 1716    Understanding Of Condition And Treatment  needs additional information;needs time to process;partial understanding of medical condition;partial understanding of treatment 01/30/18 1521   OTHER      EXPECTED DISCHARGE     Are you depressed or being treated for depression?  No 01/29/18 1208    Expected Discharge Date  01/31/18 01/30/18 1521   HOMICIDE RISK      ASSESSMENT/CONCERNS TO BE ADDRESSED     Feels Like Hurting Others  no 01/26/18 1716    Concerns To Be Addressed  home safety concerns;cognitive/perceptual concerns 01/30/18 1521   / CAREGIVER      DISCHARGE PLANNING     Filed Complexity Screen Score  6 01/30/18 1521                  UR 8A: [phone]            Medication Administration Report for Deion Diaz as of 02/06/18 1636   Legend:    Given Hold Not Given Due Canceled Entry Other Actions    Time Time (Time) Time  Time-Action       Inactive    Active    Linked        Medications 01/31/18 02/01/18 02/02/18 02/03/18 02/04/18 02/05/18 02/06/18    acetaminophen (TYLENOL) tablet 650 mg  Dose: 650 mg  Freq: EVERY 4 HOURS PRN Route: PO  PRN Reasons: mild pain,fever  Start: 02/02/18 1330   Admin Instructions: Maximum acetaminophen dose from all sources = 75 mg/kg/day not to exceed 4 grams/day.        2324 (650 mg)-Given        0416 (650 mg)-Given       2343 (650 mg)-Given        2154 (650 mg)-Given        0644 (650 mg)-Given       1523 (650 mg)-Given        0015 (650 mg)-Given           albuterol (PROAIR HFA/PROVENTIL HFA/VENTOLIN HFA) Inhaler 2 puff  Dose: 2 puff  Freq: EVERY 4 HOURS PRN Route: IN  PRN Reasons: wheezing,shortness of breath / dyspnea  Start: 01/27/18 0551              albuterol (PROVENTIL) neb solution 2.5 mg  Dose: 2.5 mg  Freq: EVERY 2 HOURS PRN Route: NEBULIZATION  PRN Reasons: wheezing,shortness of breath / dyspnea  Start: 01/30/18 1630              allopurinol (ZYLOPRIM) tablet 100 mg  Dose: 100 mg  Freq: DAILY Route: PO  Start: 01/27/18 0800    0930 (100 mg)-Given        0859 (100 mg)-Given        0945 (100 mg)-Given        0818 (100 mg)-Given        0848 (100 mg)-Given        0848 (100 mg)-Given        0907 (100 mg)-Given           aspirin EC EC tablet 81 mg  Dose: 81 mg  Freq: DAILY Route: PO  Start: 01/28/18 1045   Admin Instructions: DO NOT CRUSH.     0928 (81 mg)-Given        0859 (81 mg)-Given        0945 (81 mg)-Given        0818 (81 mg)-Given        0848 (81 mg)-Given        0848 (81 mg)-Given        0906 (81 mg)-Given           atenolol (TENORMIN) tablet 50 mg  Dose: 50 mg  Freq: DAILY Route: PO  Start: 01/27/18 0800   Admin Instructions: Hold for SBP <120 or HR <60     0928 (50 mg)-Given        0859 (50 mg)-Given        (0947)-Not Given [C]        (0819)-Not Given [C]        (0847)-Not Given        0848 (50 mg)-Given        0907 (50 mg)-Given           atorvastatin (LIPITOR) tablet 20 mg  Dose: 20 mg  Freq: DAILY AT 8PM Route: PO  Start: 01/27/18 2000 2117 (20 mg)-Given        2119 (20 mg)-Given        1956 (20 mg)-Given        2001 (20 mg)-Given        1937 (20 mg)-Given        2016 (20 mg)-Given        [ ] 2000           clindamycin (CLEOCIN) capsule 300 mg  Dose: 300 mg  Freq: 2 TIMES DAILY Route: PO  Indications Comment: hip infection suppression  Start: 01/27/18 0800     0929 (300 mg)-Given       2117 (300 mg)-Given        0859 (300 mg)-Given       2119 (300 mg)-Given        0945 (300 mg)-Given       1956 (300 mg)-Given        0818 (300 mg)-Given       2001 (300 mg)-Given        0847 (300 mg)-Given       1937 (300 mg)-Given        0848 (300 mg)-Given       2016 (300 mg)-Given        0907 (300 mg)-Given       [ ] 2000           Contraindications to both pharmacological and mechanical prophylaxis (must document contraindications for both in this order)  Freq: CONTINUOUS PRN Route: XX  Start: 01/27/18 0551   Admin Instructions: Contraindications to both pharmacological and mechanical prophylaxis (must document contraindications for both in this order)    Order specific questions:  Contraindication to pharmacological prophylaxis High bleeding risk  Contraindication to mechanical prophylaxis Other (please detail in the administration instructions)                fluticasone (FLONASE) 50 MCG/ACT spray 2 spray  Dose: 2 spray  Freq: DAILY Route: BOTH NOSTRIL  Start: 01/27/18 0800    0935 (2 spray)-Given        0858 (2 spray)-Given        0947 (2 spray)-Given        0818 (2 spray)-Given        0850 (2 spray)-Given        0847 (2 spray)-Given        0911 (2 spray)-Given           fluticasone-vilanterol (BREO ELLIPTA) 200-25 MCG/INH oral inhaler 1 puff  Dose: 1 puff  Freq: DAILY Route: IN  Start: 01/27/18 0600   Admin Instructions: *Do not use more frequently than once daily.*  Rinse mouth after use.<br>Formulary alternate for Advair 500mcg-50mcg (patient's home med)     0935 (1 puff)-Given        0903 (1 puff)-Given        0946 (1 puff)-Given        0818 (1 puff)-Given        0849 (1 puff)-Given        0843 (1 puff)-Given        0911 (1 puff)-Given           furosemide (LASIX) tablet 20 mg  Dose: 20 mg  Freq: HOLD Route: PO  PRN Comment: until resumed  Start: 02/01/18 1415              hypromellose-dextran (ARTIFICAL TEARS) ophthalmic solution 1 drop  Dose: 1 drop  Freq: EVERY 1 HOUR PRN  "Route: Both Eyes  PRN Reason: dry eyes  Start: 01/27/18 1229              ipratropium - albuterol 0.5 mg/2.5 mg/3 mL (DUONEB) neb solution 3 mL  Dose: 3 mL  Freq: EVERY 4 HOURS PRN Route: NEBULIZATION  PRN Reasons: wheezing,shortness of breath / dyspnea  Start: 01/27/18 1151        1108 (3 mL)-Given             lidocaine (LMX4) kit  Freq: EVERY 1 HOUR PRN Route: Top  PRN Reason: pain  PRN Comment: with VAD insertion or accessing implanted port.  Start: 01/27/18 0551   Admin Instructions: Do NOT give if patient has a history of allergy to any local anesthetic or any \"vazquez\" product.   Apply 30 minutes prior to VAD insertion or port access.  MAX Dose:  2.5 g (  of 5 g tube)               lidocaine 1 % 1 mL  Dose: 1 mL  Freq: EVERY 1 HOUR PRN Route: OTHER  PRN Comment: mild pain with VAD insertion or accessing implanted port  Start: 01/27/18 0551   Admin Instructions: Do NOT give if patient has a history of allergy to any local anesthetic or any \"vazquez\" product. MAX dose 1 mL subcutaneous OR intradermal in divided doses.               melatonin tablet 1 mg  Dose: 1 mg  Freq: AT BEDTIME PRN Route: PO  PRN Reason: sleep  Start: 01/27/18 0551   Admin Instructions: Do not give unless at least 6 hours of uninterrupted sleep is expected.               metoclopramide (REGLAN) tablet 5 mg  Dose: 5 mg  Freq: EVERY 6 HOURS PRN Route: PO  PRN Comment: nausea and vomiting  Start: 02/04/18 2025   Admin Instructions: This is Step 3 of nausea and vomiting management.  Give if nausea not resolved 15 minutes after giving prochlorperazine (COMPAZINE).  If nausea not resolved in 15-30 minutes, Notify provider.              Or  metoclopramide (REGLAN) injection 5 mg  Dose: 5 mg  Freq: EVERY 6 HOURS PRN Route: IV  PRN Comment: nausea and vomiting  Start: 02/04/18 2025   Admin Instructions: This is Step 3 of nausea and vomiting management.  Give if nausea not resolved 15 minutes after giving prochlorperazine (COMPAZINE).  If nausea not " resolved in 15-30 minutes, Notify provider.  Avoid use if patient has full bowel obstruction or perforation. Irritant. For ordered doses up to 10 mg, give IV Push undiluted over 2 minutes.               multivitamin, therapeutic with minerals (THERA-VIT-M) tablet 1 tablet  Dose: 1 tablet  Freq: DAILY Route: PO  Start: 01/27/18 0800    0929 (1 tablet)-Given        0859 (1 tablet)-Given        0945 (1 tablet)-Given        0818 (1 tablet)-Given        0847 (1 tablet)-Given        0847 (1 tablet)-Given        0907 (1 tablet)-Given           naloxone (NARCAN) injection 0.1-0.4 mg  Dose: 0.1-0.4 mg  Freq: EVERY 2 MIN PRN Route: IV  PRN Reason: opioid reversal  Start: 01/27/18 0551   Admin Instructions: For respiratory rate LESS than or EQUAL to 8.  Partial reversal dose:  0.1 mg titrated q 2 minutes for Analgesia Side Effects Monitoring Sedation Level of 3 (frequently drowsy, arousable, drifts to sleep during conversation).Full reversal dose:  0.4 mg bolus for Analgesia Side Effects Monitoring Sedation Level of 4 (somnolent, minimal or no response to stimulation).  For ordered doses up to 2mg give IVP. Give each 0.4mg over 15 seconds in emergency situations. For non-emergent situations further dilute in 9mL of NS to facilitate titration of response.               ondansetron (ZOFRAN-ODT) ODT tab 4 mg  Dose: 4 mg  Freq: EVERY 6 HOURS PRN Route: PO  PRN Reasons: nausea,vomiting  Start: 02/04/18 2025   Admin Instructions: This is Step 1 of nausea and vomiting management.  If nausea not resolved in 15 minutes, go to Step 2 prochlorperazine (COMPAZINE). Do not push through foil backing. Peel back foil and gently remove. Place on tongue immediately. Administration with liquid unnecessary  With dry hands, peel back foil backing and gently remove tablet; do not push oral disintegrating tablet through foil backing; administer immediately on tongue and oral disintegrating tablet dissolves in seconds; then swallow with saliva;  liquid not required.         2035 (4 mg)-Given            Or  ondansetron (ZOFRAN) injection 4 mg  Dose: 4 mg  Freq: EVERY 6 HOURS PRN Route: IV  PRN Reasons: nausea,vomiting  Start: 02/04/18 2025   Admin Instructions: This is Step 1 of nausea and vomiting management.  If nausea not resolved in 15 minutes, go to Step 2 prochlorperazine (COMPAZINE).  Irritant. For ordered doses up to 4 mg, give IV Push undiluted over 2-5 minutes.                      polyethylene glycol (MIRALAX/GLYCOLAX) Packet 17 g  Dose: 17 g  Freq: DAILY PRN Route: PO  PRN Reason: constipation  Start: 01/27/18 0551   Admin Instructions: Give in 8oz of  water, juice, or soda. Hold for loose stools.  This is the second step of a three step constipation treatment.  1 Packet = 17 grams. Mixed prescribed dose in 8 ounces of water. Follow with 8 oz. of water.               potassium chloride (KLOR-CON) Packet 20-40 mEq  Dose: 20-40 mEq  Freq: EVERY 2 HOURS PRN Route: ORAL OR FEED  PRN Reason: potassium supplementation  Start: 01/27/18 1209   Admin Instructions: Use if unable to tolerate tablets.  If Serum K+ 3.0-3.3, dose = 60 mEq po total dose (40 mEq x1 followed in 2 hours by 20 mEq x1). Recheck K+ level 4 hours after dose and the next AM.  If Serum K+ 2.5-2.9, dose = 80 mEq po total dose (40 mEq Q2H x2). Recheck K+ level 4 hours after dose and the next AM.  If Serum K+ less than 2.5, See IV order.  Dissolve packet contents in 4-8 ounces of cold water or juice.               potassium chloride 10 mEq in 100 mL intermittent infusion with 10 mg lidocaine  Dose: 10 mEq  Freq: EVERY 1 HOUR PRN Route: IV  PRN Reason: potassium supplementation  Start: 01/27/18 1209   Admin Instructions: Infuse via PERIPHERAL LINE. Use potassium with lidocaine for pain with peripheral administration.  If Serum K+ 3.0-3.3, dose = 10 mEq/hr x4 doses (40 mEq IV total dose). Recheck K+ level 2 hours after dose and the next AM.  If Serum K+ less than 3.0, dose = 10 mEq/hr x6  doses (60 mEq IV total dose). Recheck K+ level 2 hours after dose and the next AM.               potassium chloride 10 mEq in 100 mL sterile water intermittent infusion (premix)  Dose: 10 mEq  Freq: EVERY 1 HOUR PRN Route: IV  PRN Reason: potassium supplementation  Start: 01/27/18 1209   Admin Instructions: Infuse via PERIPHERAL LINE or CENTRAL LINE. Use for central line replacement if patient weight less than 65 kg, if patient is on TPN with high potassium content or if unit does not stock 20 mEq bags.   If Serum K+ 3.0-3.3, dose = 10 mEq/hr x4 doses (40 mEq IV total dose). Recheck K+ level 2 hours after dose and the next AM.   If Serum K+ less than 3.0, dose = 10 mEq/hr x6 doses (60 mEq IV total dose). Recheck K+ level 2 hours after dose and the next AM.               potassium chloride 20 mEq in 50 mL intermittent infusion  Dose: 20 mEq  Freq: EVERY 1 HOUR PRN Route: IV  PRN Reason: potassium supplementation  Start: 01/27/18 1209   Admin Instructions: Infuse via CENTRAL LINE Only. May need EKG if less than 65 kg or on TPN - Max rate is 0.3 mEq/kg/hr for patients not on EKG monitoring.   If Serum K+ 3.0-3.3, dose = 20 mEq/hr x2 doses (40 mEq IV total dose). Recheck K+ level 2 hours after dose and the next AM.  If Serum K+ less than 3.0, dose = 20 mEq/hr x3 doses (60 mEq IV total dose). Recheck K+ level 2 hours after dose and the next AM.               potassium chloride SA (K-DUR/KLOR-CON M) CR tablet 20-40 mEq  Dose: 20-40 mEq  Freq: EVERY 2 HOURS PRN Route: PO  PRN Reason: potassium supplementation  Start: 01/27/18 1209   Admin Instructions: Use if able to take PO.   If Serum K+ 3.0-3.3, dose = 60 mEq po total dose (40 mEq x1 followed in 2 hours by 20 mEq x1). Recheck K+ level 4 hours after dose and the next AM.  If Serum K+ 2.5-2.9, dose = 80 mEq po total dose (40 mEq Q2H x2). Recheck K+ level 4 hours after dose and the next AM.  If Serum K+ less than 2.5, See IV order.  DO NOT CRUSH.                prochlorperazine (COMPAZINE) injection 5 mg  Dose: 5 mg  Freq: EVERY 6 HOURS PRN Route: IV  PRN Reasons: nausea,vomiting  Start: 02/04/18 2025   Admin Instructions: This is Step 2 of nausea and vomiting management. Give if nausea not resolved 15 minutes after giving ondansetron (ZOFRAN). If nausea not resolved in 15 minutes, go to Step 3 metoclopramide (REGLAN), if ordered.  For ordered doses up to 10 mg, give IV Push undiluted. Each 5mg over 1 minute.              Or  prochlorperazine (COMPAZINE) tablet 5 mg  Dose: 5 mg  Freq: EVERY 6 HOURS PRN Route: PO  PRN Reason: vomiting  Start: 02/04/18 2025   Admin Instructions: This is Step 2 of nausea and vomiting management. Give if nausea not resolved 15 minutes after giving ondansetron (ZOFRAN). If nausea not resolved in 15 minutes, go to Step 3 metoclopramide (REGLAN), if ordered.              Or  prochlorperazine (COMPAZINE) Suppository 12.5 mg  Dose: 12.5 mg  Freq: EVERY 12 HOURS PRN Route: RE  PRN Reasons: nausea,vomiting  Start: 02/04/18 2025   Admin Instructions: This is Step 2 of nausea and vomiting management. Give if nausea not resolved 15 minutes after giving ondansetron (ZOFRAN). If nausea not resolved in 15 minutes, go to Step 3 metoclopramide (REGLAN), if ordered.               rivastigmine (EXELON) 4.6 MG/24HR 24 hr patch 1 patch  Dose: 1 patch  Freq: DAILY Route: TD  Start: 01/30/18 1415    0928 (1 patch)-Given        1216 (1 patch)-Given        0945 (1 patch)-Given        0818 (1 patch)-Given        0850 (1 patch)-Given        0844 (1 patch)-Given        0911 (1 patch)-Given           rivastigmine (EXELON) Patch in Place  Freq: EVERY 8 HOURS Route: TD  Start: 01/30/18 1415   Admin Instructions: Chart every shift, confirming that patch is still in place on patient (no barcode scan needed). See patch order for dose information.     0743 ( )-Patch in Place       1404 ( )-Patch in Place       2334 ( )-Patch in Place        0643 ( )-Patch in Place       6518  (1 each)-Patch in Place       2119 ( )-Patch in Place        0552 ( )-Patch in Place       1449 ( )-Patch in Place [C]       2130 ( )-Patch in Place        0608 ( )-Patch in Place       1437 (1 each)-Given [C]       2122 ( )-Patch in Place [C]        0515 ( )-Patch in Place       1454 ( )-Patch in Place       2154 ( )-Patch in Place        0530 ( )-Patch in Place       1410 ( )-Positive [C]       2214 ( )-Patch in Place               1501 ( )-Patch in Place       [ ] 2215           rivastigmine (EXELON) patch REMOVAL  Freq: DAILY Route: TD  Start: 01/31/18 0800   Admin Instructions: Remove patch when new patch is applied or patch is discontinued.     0931 ( )-Patch Removed        0903 ( )-Patch Removed        0947 ( )-Patch Removed        0818 ( )-Patch Removed        0852 ( )-Patch Removed [C]        0850 ( )-Positive [C]        0914 ( )-Patch Removed           senna-docusate (SENOKOT-S;PERICOLACE) 8.6-50 MG per tablet 1 tablet  Dose: 1 tablet  Freq: 2 TIMES DAILY PRN Route: PO  PRN Reason: constipation  Start: 01/27/18 0551   Admin Instructions: If no bowel movement in 24 hours, increase to 2 tablets PO.  Hold for loose stools.              Or  senna-docusate (SENOKOT-S;PERICOLACE) 8.6-50 MG per tablet 2 tablet  Dose: 2 tablet  Freq: 2 TIMES DAILY PRN Route: PO  PRN Reason: constipation  Start: 01/27/18 0551   Admin Instructions: Hold for loose stools.               sodium chloride (PF) 0.9% PF flush 3 mL  Dose: 3 mL  Freq: EVERY 8 HOURS Route: IK  Start: 01/27/18 0600   Admin Instructions: And Q1H PRN, to lock peripheral IV dormant line.            (0931)-Not Given       (1613)-Not Given               (0906)-Not Given       (1616)-Not Given               (0947)-Not Given       (1559)-Not Given [C]        (0001)-Not Given       (0819)-Not Given       (1636)-Not Given       (2344)-Not Given        (0852)-Not Given [C]       (1718)-Not Given               (0850)-Not Given       (1719)-Not Given [C]                       (1501)-Not Given           sodium chloride (PF) 0.9% PF flush 3 mL  Dose: 3 mL  Freq: EVERY 1 HOUR PRN Route: IK  PRN Reason: line flush  PRN Comment: for peripheral IV flush post IV meds  Start: 01/27/18 0551              tamsulosin (FLOMAX) capsule 0.4 mg  Dose: 0.4 mg  Freq: AT BEDTIME Route: PO  Start: 01/27/18 2200    2117 (0.4 mg)-Given        2119 (0.4 mg)-Given        2132 (0.4 mg)-Given        2121 (0.4 mg)-Given        2154 (0.4 mg)-Given        2137 (0.4 mg)-Given        [ ] 2200        Medications 01/31/18 02/01/18 02/02/18 02/03/18 02/04/18 02/05/18 02/06/18               INTERAGENCY TRANSFER FORM - NOTES (H&P, Discharge Summary, Consults, Procedures, Therapies)   1/26/2018                        8A: [phone]               History & Physicals      H&P by Ayo Chaidez MD at 1/27/2018  6:21 AM     Author:  Ayo Chaidze MD Service:  Internal Medicine Author Type:  Resident    Filed:  1/27/2018  7:32 AM Date of Service:  1/27/2018  6:21 AM Creation Time:  1/27/2018  6:21 AM    Status:  Attested :  Ayo Chaidez MD (Resident)    Cosigner:  Denilson Edmondson MD at 1/27/2018 11:54 AM        Attestation signed by Denilson Edmondson MD at 1/27/2018 11:54 AM        Attestation:  Physician Attestation   Denilson MOORE, saw and evaluated Deion Diaz  I have reviewed and discussed with Dr. Chaidez  I personally reviewed the vital signs, medications, labs and imaging.    78 yo M with hx of HTN, CAD, COPD,  Gout, BPH s/P TURP, S/P THOMAS admitted for  worsening and recurrent vs chronic hallucinations x 1 year     Reports doing well  Intermittently falling asleep  Hx obtained from Son in law  Patient denies any pain or discomfort.     PMH: HTN, CAD, COPD,  Gout, BPH s/P TURP, S/P THOMAS  FH, SH, Allergic hx: Reviewed    Intermittently sleepy  B/L end expiratory wheeze  S1, S2 normal  Soft, Moderately distended abdomen    Labs reviewed      Key management decisions made by me:     Hallucinations: Unkown  etiology at this time. Differenetial broad  CT head on 01/11 at OSH with no acute abnormality. Recent TSH jerel  Will check Vitamin B12, RPR, Folic acid  OT consult for MOCA test  Will treat for possible UTI with Ceftriaxone. Follow culture  Neurology consult for further imaging/testing  Psychiatry consult.     Rest as outlined.       Denilson Cummingshal  Date of Service (when I saw the patient): 01/27/18                               History and Physical     Deion Diaz MRN# [id number]   YOB: 1940 Age: 77 year old      Date of Admission: 1/26/2018  Primary care provider: Jerrod Graves            Assessment and Plan:[TL1.1]   76 yo M with worsening and recurrent vs chronic hallucinations x 1 year without prior psychiatric or dementia history.[TL1.2]    #[TL1.1] worsening visual and tactile hallucinations: differential is broad. Nutritional, metabolic, primary psychosis, delirium, dementia with psychosis, neurosyphilis, paraneoplastic syndrome, ingestion. UDS positive for amphetamines, which is not on med list but could be cross reactive or true positive due to ingestion. Has now had poor sleep and disrupted sleep wake cycle. TSH normal in Jan at [facility]     - Neuro consult   - consider RPR   - ct zyprexa QHS, bupropion BID   - currently not a danger to himself or others but may need 1:1[TL1.2]     Chronic medical problems    CAD: atorvastatin  HTN: atenolol, furosemide   Gout: allopurinol  S/p left hip replacement: continue clindamycin suppressive therapy  BPH s/p TURP: ct flomax         Lines and tubes: PIV  FEN: regular   DVT PPx: ambulate with assist. Falls risk and delirium risk  Code status: full  Disposition: admit to medicine for neuro consult            Yue Jc Le   Med Peds PGY4   [id number]                    Chief Complaint:[TL1.1]   Worsening hallucinations[TL1.3]     History is obtained[TL1.1] mostly from[TL1.3] patient's son in law, and chart review[TL1.1]  Patient has limited  "ability to participate currently[TL1.3]         History of Present Illness:   Deion Diaz is a 77 year old male with a history of[TL1.1] CAD s/p stent 2003, HTN, CKD, COPD, SANTIAGO,[TL1.3] who presents with[TL1.1] persistent (versus recurrent) and worsening auditory and tactile hallucinations over the past year.     Patient's son in law is here currently but lives in [place] so much of this history is from a dc summary. Admitted to [facility] 1/11-1/15/18     \"[TL1.3]He was admitted to [facility] on 1/11/2018 after presenting to [facility] ED for the evaluation of visual (sees animals, maggots, snakes, people trying to hurt him) and tactile hallucinations, which are ongoing after a UTI 1 year ago. The day PTA, he called the police 5x due to hallucinations. Follows with Psychiatry and treated with Seroquel until recently.     Patient is S/p left THOMAS 8/7/17 by Dr. Campos Gentile. 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 Continues on suppressive antibiotics, clindamycin for staph infection.  Underwent XPS laser TURP on 10/30/17 by Dr. Pierre. Prior to surgery had an indwelling saldana catheter.       Admitted to [facility] 12/6-12/14/17 for UTI, bronchitis, hallucinations, and neck pain after falls. Head/Neck CT negative for acute pathology, but showed significant degenerative changes of cervical spine. UC 12/6 grew klebsiella and pseudomonas. UC 12/10 negative. Renal US with normal kidneys, no stones. Significantly enlarged prostate and small amount of echogenic debris in the lumen of the bladder. Discharged to [facility] TCU and returned to assisted living on 12/27. Of note, infections exacerbate hallucinations.[TL1.4]\"[TL1.3]    [TL1.4]    Son in law and daughter brought him to [place] ED to be evaluated due to the increasing violence of the hallucinations and concern about his safety.[TL1.3] Wanted him to be admitted to Geriatric " Psych unit per previous discussion with PCP.[TL1.2] He[TL1.3] currently[TL1.2] lives at an [facility] and is relatively independent at baseline, but in the past few weeks, he has, for example, held a  knife to his toes to clean the bugs off of them.[TL1.3] He's taken a hammer to his scooter because one of his recurrent hallucinations is a woman who one time shrunk down and hid inside the engine.[TL1.2] He has been following with outpatient psychiatry with some changes in meds (switch from seroquel to zyprexa) with no effect. Some of the hallucinations preivously were thought to be related to or at least exacerbated by infections, yousif with the above surgeries and complications.[TL1.3] Per family, he did not have problems with confusion or hallucination or forgetfulness before about a year ago.[TL1.2]     Interestingly,[TL1.5] he is aware of his hallucinations and has said that it feels like he's living in two different worlds and is getting pulled more and more into the world with hallucinations. Per son in law, he is able to recognize family members and talk logically and accurately about politics and his past. Unclear history of head traumas/concussions in his younger years. Denies CP, dyspnea, abd pain, runny nose, fever, cough. Still able to eat and drink normally and has not lost weight.[TL1.2]                Past Medical History:     Past Medical History:   Diagnosis Date     Chronic kidney disease      Closed left hip fracture (H)      Emphysema of lung (H)              Past Surgical History:     Past Surgical History:   Procedure Laterality Date     BACK SURGERY      neck, back      CARDIAC SURGERY      stent X1              Social History:     Social History   Substance Use Topics     Smoking status: Former Smoker     Packs/day: 1.50     Years: 40.00     Quit date: 5/31/2000     Smokeless tobacco: Never Used     Alcohol use Yes      Comment: none recently             Family History:   No family history on  file.  Unable to obtain due to patient mental status          Allergies:   No Known Allergies          Medications:     Med list reconciled with list from [facility]     Prescriptions Prior to Admission   Medication Sig Dispense Refill Last Dose     furosemide (LASIX) 40 MG tablet Take 1 tablet (40 mg) by mouth daily Hold for SBP<100 7 tablet 0      gabapentin (NEURONTIN) 300 MG capsule Take 1 capsule (300 mg) by mouth At Bedtime for 7 days 7 capsule 0      acetaminophen (TYLENOL) 325 MG tablet Take 2 tablets (650 mg) by mouth 3 times daily 100 tablet       buPROPion (WELLBUTRIN XL) 150 MG 24 hr tablet Take 1 tablet (150 mg) by mouth daily for 7 days 7 tablet 0      HYDROcodone-acetaminophen (NORCO) 5-325 MG per tablet Take 1 tablet by mouth every 4 hours as needed for moderate to severe pain 21 tablet 0      NONFORMULARY Hydrocortisone 2.5% cream mixed 1:1 with Vanicream apply BID from neck down to affected area   5/31/2017 at am     Ca Carbonate-Mag Hydroxide (ROLAIDS PO) Take by mouth as needed   prn     TAMSULOSIN HCL PO Take 0.4 mg by mouth At Bedtime   5/30/2017 at hs     fluticasone-salmeterol (ADVAIR) 500-50 MCG/DOSE diskus inhaler Inhale 1 puff into the lungs every 12 hours   5/31/2017 at am     albuterol (PROAIR HFA, PROVENTIL HFA, VENTOLIN HFA) 108 (90 BASE) MCG/ACT inhaler Inhale 2 puffs into the lungs every 6 hours as needed    prn     ALLOPURINOL PO Take 100 mg by mouth daily    5/31/2017 at am     ATENOLOL PO Take 50 mg by mouth daily    5/31/2017 at am     ATORVASTATIN CALCIUM PO Take 20 mg by mouth daily         multivitamin, therapeutic with minerals (MULTI-VITAMIN) TABS Take 1 tablet by mouth daily   5/31/2017 at am     NITROGLYCERIN SL Place 0.4 mg under the tongue   prn             Review of Systems:   traci[TL1.1]bl[TL1.3]e to obtain complete 10 point ROS due to patient mental status            Physical Exam:   /60  Pulse 84  Temp 97.3  F (36.3  C) (Oral)  Resp 18  Wt 73.9 kg (163 lb)  SpO2  92%  BMI 27.12 kg/m2    GEN:[TL1.1] sleeping, arouses easily but having difficulty staying awake to talk,[TL1.3] NAD  HEAD/NECK: NCAT. Neck supple. No lymphadenopathy.    ENT: Normal conjunctivae. Oropharynx with no erythema, no exudates.    CV: RRR, no rubs/gallops/murmurs  RESP: breathing comfortably on room air, CTA bilaterally  ABD/GI: soft,[TL1.1] obese,[TL1.3] NTND. No rebound, no guarding. Normal BS  DERM: no suspicious lesions or rashes[TL1.1] in exposed skin[TL1.3]  EXT: warm, well-perfused. No pitting edema, 2+ pedal pulses bilaterally[TL1.1]. Both shins with diffuse scabs and shallow lacerations[TL1.3]  NEURO:[TL1.1] oriented to self, situation.[TL1.3] CN2-12 intact.[TL1.1] Sensation and strength grossly intact, 4/5 strength but symmetric. MAEE[TL1.3]  PSYCH:[TL1.1] somewhat coherent and appropriate though sleepy[TL1.3]     LABS[TL1.1]  utox + for amphetamines and opiates[TL1.4]   UA with pyuria[TL1.3]     IMAGING[TL1.1]    CT head in May 2017 wnl, no masses or bleeds  Also 18 at [facility], wn[TL1.3]       Revision History        User Key Date/Time User Provider Type Action    > TL1.2 2018  7:32 AM Ayo Chaidez MD Resident Sign     TL1.5 2018  6:49 AM Ayo Chaidez MD Resident Share     TL1.3 2018  6:47 AM Ayo Chaidez MD Resident Share     TL1.4 2018  6:32 AM Ayo Chaidez MD Resident Share     TL1.1 2018  6:21 AM Ayo Chaidez MD Resident                   Discharge Summaries     No notes of this type exist for this encounter.         Consult Notes      Consults by Sasha Dickens MSW at 2018  3:22 PM     Author:  Sasha Dickens MSW Service:  Social Work Author Type:      Filed:  2018  3:30 PM Date of Service:  2018  3:22 PM Creation Time:  2018  3:21 PM    Status:  Signed :  Sasha Dickens MSW ()         Social Work: Assessment with Discharge Plan    Patient Name:  Deion Diaz  :   1940  Age:  77 year old  MRN:  [id number]  Risk/Complexity Score:  Filed Complexity Screen Score: 6  Completed assessment with:  Pt, adult daughters (Mackenzie and Diana). Writer left voicemail message for Nuha [phone] with pt's verbal consent     Presenting Information   Reason for Referral:  Discharge plan  Date of Intake:  January 30, 2018  Referral Source:  Physician  Decision Maker:  Pt with support of adult children  Alternate Decision Maker: per policy, pt's NOK-Adult children Emilia (son), Daughter Mackenzie or Daughter Diana    Living Situation:  House  Previous Functional Status:  Independent  Patient and family understanding of hospitalization:  Seeking diagnosis/plan for pt's frequent falls and hallucination  Cultural/Language/Spiritual Considerations:    Adjustment to Illness:  Frustrated, stressful. Pt wants to DC home w/resumption of home care but medical team supports DC to TCU    Physical Health  Reason for Admission:[MK1.1]    1. Hallucinations of tactile sensation    2. Visual hallucination[MK1.2]      Services Needed/Recommended:  TCU    Mental Health/Chemical Dependency  Diagnosis:  ? Lewy Body  Support/Services in Place:  none  Services Needed/Recommended:  Further Neuro Psych testing    Support System  Significant relationship at present time:  Yes, adult children  Family of origin is available for support:  Yes  Other support available:  Pt identifies having home care involvement  Gaps in support system:  Pt may need higher level of care  Patient is caregiver to:  None     Provider Information   Primary Care Physician:  Jerrod Graves   [phone]   Clinic:  [place] [place] [address] / [place]      :      Financial   Income Source:  Pension, social security  Financial Concerns:  None noted  Insurance:    Payor/Plan Subscriber Name Rel Member # Group #   MEDICARE - MEDICARE EMILIA ALMARAZ  [id number]       ATTN CLAIMS, [address]   COMMERCIAL -  "[facility] hospital[facility]* EMILIA ALMARAZ  [id number]      [address], [place] [address]       Discharge Plan   Patient and family discharge goal:  Pt verbalizes agreement to referral to [facility], stated preference is to DC home w/resumption of home care. Pt is happy to have involvement of his adult children in DC planning  Provided education on discharge plan:  YES  Patient agreeable to discharge plan:  YES  A list of Medicare Certified Facilities was provided to the patient and/or family to encourage patient choice. Patient's choices for facility are:  [facility]-pt has had previous stay there  Will NH provide Skilled rehabilitation or complex medical:  YES  General information regarding anticipated insurance coverage and possible out of pocket cost was discussed. Patient and patient's family are aware patient may incur the cost of transportation to the facility, pending insurance payment: YES  Barriers to discharge:  Assessment process    Discharge Recommendations   Anticipated Disposition:  Facility:  [facility] [phone]  Transportation Needs:  Family:  adult children can provide  Name of Transportation Company and Phone:  Adult children can provide    Additional comments   Writer introduced role/reason for visit. 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 He states he had just woken up from a nap, asked if he should return to his room \"805 (he is in 803) and needed verbal cuing to reorient to place. After being verbally cued, pt looked around and said \"Oh you're right, I am in my room.\" He then shared that he did not get good sleep because the \"couple of ladies came to spend the night. I would be okay with that if they left me alone but they did not.\" Pt provided consent for writer to speak w/ his adult daughters and involve them in DC plan. He also consented to referral to [facility]. Writer left voicemail message for Diana. Writer spoke w/Lisseth in Admissions at [facility] and sen assessment " information via Epic. Await response. SW con't to follow[MK1.1]     Revision History        User Key Date/Time User Provider Type Action    > MK1.2 1/30/2018  3:30 PM Sasha Dickens MSW  Sign     MK1.1 1/30/2018  3:21 PM Sasha Dickens MSW              Consults by Benjamín Richard MD at 1/30/2018  1:39 PM     Author:  Benjamín Richard MD Service:  Psychiatry Author Type:  Physician    Filed:  1/30/2018  1:52 PM Date of Service:  1/30/2018  1:39 PM Creation Time:  1/30/2018  1:39 PM    Status:  Signed :  Benjamín Richard MD (Physician)     Consult Orders:    1. Psychiatry IP Consult: follow up consult for ongoing hallucinations, insomnia.; Consultant may enter orders: Yes; Patient to be seen: Routine; Call back #: [id number] [[phone]] ordered by Jose Raul Santamaria MD at 01/30/18 0748                [facility]   Psychiatric Consult  Admission date: 1/26/2018  Deion Diaz  [id number]  01/30/18  Denilson Debo    Time: 39 minutes on encounter, >50% of which was spent in counseling and/or coordination of care consisting of: communication and education with the patient, communication with family and or friends if documented in note, lab/image/study evaluation, support staff communication, and other sources pertinent to excellent patient care.             Assessment & Plan:     Diagnosis:  Lewy body dementia until proven otherwise    Assessment:  Deion is currently experiencing the most likely etiology as Lewy body dementia.  It is somewhat convoluted based on his lack of general normal gait and I cannot find parkinsonian type symptoms.  He did not have any rigidity he did not have any tremor at rest.  Unclear at this point if he has a masklike face though it could be in the initial stages.  Generally this condition starts with visual hallucinations in the older population that they are not particularly  concerned about.  Generally individuals with this condition are able to identify them as hallucinations and complain about them.  We discussed the utility of ignoring them and also the use of rivastigmine and similar medications as being the primary medication of choice.  I would not suspect antipsychotic medications to be of benefit and it is not necessary to discontinue bupropion unless he does not have response from rivastigmine.    Plan:  Recommend starting rivastigmine 4.6 mg patch and titrate up as necessary  Antipsychotic medications likely of low yield  Discontinuation of bupropion probably not necessary  No need for inpatient psychiatry or a one-to-one  Please reconsult as necessary            HPI:   Deion Diaz with a past medical history of hypertension, coronary artery disease, COPD, gout, obstructive sleep apnea, hyperlipidemia, BPH was admitted 1/26/2018 for continued hallucinations of more than a year and recent Staphylococcus infection of his hip.    According records he last had hallucinations in 2016 while on opiate medications that resolved after discontinuation.  He has also had urinary tract infection and other types of infection that lead to delirium and subsequent hallucinations.  His urinary toxicology resulted in a positive amphetamine it is likely related to bupropion.  Neurology evaluation has been negative and recommended donepezil as well as the potential for Lewy body dementia.  This was not trialed yet and he has previously tried quetiapine and olanzapine without success in the outpatient setting.  Psychiatric follow-up and evaluation have been discussing reducing bupropion and titrating up on olanzapine.    Upon meeting with him he continues to have mostly visual hallucinations of 2 females one that he describes as the sister of the other and they have children that run around at times.  They do sexual actions towards him.  He has previously had hallucinations of multiple animals  possibly starting about 2 years ago to his recollection.  He does not have any depressive symptoms or previous psychiatric conditions.  He does not have any thoughts of harming himself or others or any hopelessness or helplessness.  He started the bupropion related to smoking cessation and had improvement in mood and has been taking this for years.  He has some recent weight loss but was not purposeful but he found it helpful and he is just frustrated with his movement and recent infection.  He does not have any paranoia about staff members and is not experiencing any depressive symptoms whatsoever.  He does not seem to be anxious and would like assistance with the hallucinations he is experiencing and is able to identify them as such.           Physical ROS:   The patient endorsed the above issues. The remainder of 10-point review of systems was negative except as noted in HPI.         Current Medications:[JD1.1]       OLANZapine  7.5 mg Oral At Bedtime     buPROPion  150 mg Oral Daily     aspirin EC  81 mg Oral Daily     atenolol (TENORMIN) tablet 50 mg  50 mg Oral Daily     allopurinol (ZYLOPRIM) tablet 100 mg  100 mg Oral Daily     atorvastatin (LIPITOR) tablet 20 mg  20 mg Oral Daily at 8 pm     fluticasone-vilanterol  1 puff Inhalation Daily     furosemide  20 mg Oral Daily     multivitamin, therapeutic with minerals  1 tablet Oral Daily     tamsulosin (FLOMAX) capsule 0.4 mg  0.4 mg Oral At Bedtime     clindamycin  300 mg Oral BID     fluticasone  2 spray Both Nostrils Daily     sodium chloride (PF)  3 mL Intracatheter Q8H[JD1.2]            PTA Medications:[JD1.1]     Prescriptions Prior to Admission   Medication Sig Dispense Refill Last Dose     furosemide (LASIX) 40 MG tablet Take 1 tablet (40 mg) by mouth daily Hold for SBP<100 7 tablet 0      gabapentin (NEURONTIN) 300 MG capsule Take 1 capsule (300 mg) by mouth At Bedtime for 7 days 7 capsule 0      acetaminophen (TYLENOL) 325 MG tablet Take 2 tablets  (650 mg) by mouth 3 times daily 100 tablet       buPROPion (WELLBUTRIN XL) 150 MG 24 hr tablet Take 1 tablet (150 mg) by mouth daily for 7 days 7 tablet 0      HYDROcodone-acetaminophen (NORCO) 5-325 MG per tablet Take 1 tablet by mouth every 4 hours as needed for moderate to severe pain 21 tablet 0      NONFORMULARY Hydrocortisone 2.5% cream mixed 1:1 with Vanicream apply BID from neck down to affected area   5/31/2017 at am     Ca Carbonate-Mag Hydroxide (ROLAIDS PO) Take by mouth as needed   prn     TAMSULOSIN HCL PO Take 0.4 mg by mouth At Bedtime   5/30/2017 at hs     fluticasone-salmeterol (ADVAIR) 500-50 MCG/DOSE diskus inhaler Inhale 1 puff into the lungs every 12 hours   5/31/2017 at am     albuterol (PROAIR HFA, PROVENTIL HFA, VENTOLIN HFA) 108 (90 BASE) MCG/ACT inhaler Inhale 2 puffs into the lungs every 6 hours as needed    prn     ALLOPURINOL PO Take 100 mg by mouth daily    5/31/2017 at am     ATENOLOL PO Take 50 mg by mouth daily    5/31/2017 at am     ATORVASTATIN CALCIUM PO Take 20 mg by mouth daily         multivitamin, therapeutic with minerals (MULTI-VITAMIN) TABS Take 1 tablet by mouth daily   5/31/2017 at am     NITROGLYCERIN SL Place 0.4 mg under the tongue   prn[JD1.2]          Allergies:[JD1.1]   No Known Allergies[JD1.2]       Labs:[JD1.1]     Recent Results (from the past 48 hour(s))   CBC with platelets    Collection Time: 01/29/18  7:55 AM   Result Value Ref Range    WBC 8.0 4.0 - 11.0 10e9/L    RBC Count 4.36 (L) 4.4 - 5.9 10e12/L    Hemoglobin 11.3 (L) 13.3 - 17.7 g/dL    Hematocrit 36.9 (L) 40.0 - 53.0 %    MCV 85 78 - 100 fl    MCH 25.9 (L) 26.5 - 33.0 pg    MCHC 30.6 (L) 31.5 - 36.5 g/dL    RDW 16.3 (H) 10.0 - 15.0 %    Platelet Count 344 150 - 450 10e9/L   Basic metabolic panel    Collection Time: 01/29/18  7:55 AM   Result Value Ref Range    Sodium 145 (H) 133 - 144 mmol/L    Potassium 3.8 3.4 - 5.3 mmol/L    Chloride 111 (H) 94 - 109 mmol/L    Carbon Dioxide 27 20 - 32 mmol/L     Anion Gap 7 3 - 14 mmol/L    Glucose 86 70 - 99 mg/dL    Urea Nitrogen 12 7 - 30 mg/dL    Creatinine 0.90 0.66 - 1.25 mg/dL    GFR Estimate 82 >60 mL/min/1.7m2    GFR Estimate If Black >90 >60 mL/min/1.7m2    Calcium 8.6 8.5 - 10.1 mg/dL   Basic metabolic panel    Collection Time: 01/30/18  5:08 AM   Result Value Ref Range    Sodium 144 133 - 144 mmol/L    Potassium 3.7 3.4 - 5.3 mmol/L    Chloride 110 (H) 94 - 109 mmol/L    Carbon Dioxide 31 20 - 32 mmol/L    Anion Gap 3 3 - 14 mmol/L    Glucose 89 70 - 99 mg/dL    Urea Nitrogen 10 7 - 30 mg/dL    Creatinine 0.93 0.66 - 1.25 mg/dL    GFR Estimate 78 >60 mL/min/1.7m2    GFR Estimate If Black >90 >60 mL/min/1.7m2    Calcium 8.5 8.5 - 10.1 mg/dL[JD1.2]          Physical and Psychiatric Examination:[JD1.1]     /65  Pulse 91  Temp 96.1  F (35.6  C) (Oral)  Resp 16  Wt 76.3 kg (168 lb 4.8 oz)  SpO2 92%  BMI 28.01 kg/m2[JD1.2]  Weight is[JD1.1] 168 lbs 4.8 oz  Body mass index is 28.01 kg/(m^2).[JD1.2]                                           Weight over time:[JD1.1]  Vitals:    01/26/18 1716 01/27/18 1700 01/28/18 1100   Weight: 73.9 kg (163 lb) 77 kg (169 lb 12.1 oz) 76.3 kg (168 lb 4.8 oz)[JD1.2]       Mental Status Exam:  Deion is a 77-year-old male wearing a white shirt and is overweight.  His speech is of an appropriate rate and tone and his language is intact.  Behavior is appropriate and he does not have any abnormal movements.  His affect is neutral and he smiles at times.  His mood he describes as all right.  His thought content consists of the above without thoughts of harming himself or others or current delusions.  His thought process is goal without looseness of association.  He does endorse abnormal perceptions.  He is alert and aware of his current location and circumstances.  His attention concentration appears adequate.  His cognition and fund of knowledge appears normal.  His long-term/short-term/remote memory appears intact.  His insight  and judgment are both good.    Benjamín Richard  [facility]      The following document has been created with voice recognition software and may contain unintentional word substitutions.[JD1.1]       Revision History        User Key Date/Time User Provider Type Action    > JD1.2 1/30/2018  1:52 PM Benjamín Richard MD Physician Sign     JD1.1 1/30/2018  1:39 PM Benjamín Richard MD Physician             Consults by Freedom Mcneil MD at 1/29/2018  1:05 PM     Author:  Freedom Mcneil MD Service:  Psychiatry Author Type:  Physician    Filed:  1/29/2018  1:22 PM Date of Service:  1/29/2018  1:05 PM Creation Time:  1/29/2018  1:04 PM    Status:  Signed :  Freedom Mcneil MD (Physician)         [facility]   Psychiatric Consult Follow-up  Hospital Day:[TP1.1] 2[TP1.2]        Interim History:   Psychiatry was asked to follow-up with the patient today. I reviewed the medical record of this admission and met with the patient.    Patient reports that he feels the same since his arrival to the hospital.  He was able to describe in detail his various hallucinations.  Indicates that his hallucinations are visual about 99% of the time.  He occasionally will have tactile and auditory hallucinations.  He claims that he tries to talk to his visual hallucinations but they usually do not act indicates that he is aware that his hallucinations are not indeed not real however he still is obligated to help him in some way.  He has a somewhat illogical comparison to the need to treat all people the same.  During the 2 he indicates that 1 of his hallucinations started biting his toes; at that point he indicates that he believes this is some sort of foreplay and that he will often tell the hallucinations that he is 79 or 24 they should go find someone else.  Otherwise during the interview the patient is oriented. He is a very strong  recollection of past events.  He will make corrections to the smallest details when I ask him questions about information I have read in the chart.    Asked patient about his recent vision.  He indicates that when the hallucinations first started he went to see an ophthalmologist discovered he had floaters in his left eye and needed cataract surgery in his right.  Patient also indicates that the hallucinations are worse at night however they are present all the time.  He states that last neither particularly bad as they continued to bite at his feet and pull at his joints.    He denies any side effects related to his olanzapine 5 mg.  He would be agreeable to increasing this.         Medications:[TP1.1]       buPROPion  150 mg Oral Daily     aspirin EC  81 mg Oral Daily     atenolol (TENORMIN) tablet 50 mg  50 mg Oral Daily     allopurinol (ZYLOPRIM) tablet 100 mg  100 mg Oral Daily     atorvastatin (LIPITOR) tablet 20 mg  20 mg Oral Daily at 8 pm     fluticasone-vilanterol  1 puff Inhalation Daily     furosemide  20 mg Oral Daily     multivitamin, therapeutic with minerals  1 tablet Oral Daily     tamsulosin (FLOMAX) capsule 0.4 mg  0.4 mg Oral At Bedtime     clindamycin  300 mg Oral BID     fluticasone  2 spray Both Nostrils Daily     OLANZapine  5 mg Oral At Bedtime     sodium chloride (PF)  3 mL Intracatheter Q8H[TP1.2]          Allergies:[TP1.1]   No Known Allergies[TP1.2]       Labs:[TP1.1]     Recent Results (from the past 24 hour(s))   CBC with platelets    Collection Time: 01/29/18  7:55 AM   Result Value Ref Range    WBC 8.0 4.0 - 11.0 10e9/L    RBC Count 4.36 (L) 4.4 - 5.9 10e12/L    Hemoglobin 11.3 (L) 13.3 - 17.7 g/dL    Hematocrit 36.9 (L) 40.0 - 53.0 %    MCV 85 78 - 100 fl    MCH 25.9 (L) 26.5 - 33.0 pg    MCHC 30.6 (L) 31.5 - 36.5 g/dL    RDW 16.3 (H) 10.0 - 15.0 %    Platelet Count 344 150 - 450 10e9/L   Basic metabolic panel    Collection Time: 01/29/18  7:55 AM   Result Value Ref Range    Sodium  145 (H) 133 - 144 mmol/L    Potassium 3.8 3.4 - 5.3 mmol/L    Chloride 111 (H) 94 - 109 mmol/L    Carbon Dioxide 27 20 - 32 mmol/L    Anion Gap 7 3 - 14 mmol/L    Glucose 86 70 - 99 mg/dL    Urea Nitrogen 12 7 - 30 mg/dL    Creatinine 0.90 0.66 - 1.25 mg/dL    GFR Estimate 82 >60 mL/min/1.7m2    GFR Estimate If Black >90 >60 mL/min/1.7m2    Calcium 8.6 8.5 - 10.1 mg/dL[TP1.2]          Psychiatric Examination:[TP1.1]     /51 (BP Location: Left arm)  Pulse 91  Temp 95.8  F (35.4  C) (Oral)  Resp 18  Wt 76.3 kg (168 lb 4.8 oz)  SpO2 94%  BMI 28.01 kg/m2[TP1.2]  Weight is[TP1.1] 168 lbs 4.8 oz  Body mass index is 28.01 kg/(m^2).[TP1.2]    Appearance: awake, alert, adequately groomed and casually dressed  Attitude:  cooperative  Eye Contact:  good  Mood:  good  Affect:  appropriate and in normal range and mood congruent  Speech:  clear, coherent and normal prosody  Language: fluent and intact in English  Psychomotor, Gait, Musculoskeletal:  no evidence of tardive dyskinesia, dystonia, or tics  Throught Process:  some illogical reasoning for responding to his hallucinations  Associations:  no loose associations  Thought Content:  Visual hallucinations to which he has a surprising amount of insight  Insight:  good  Judgement:  need to respond to his hallucinations despite knowing they are not real displays poor judgement  Oriented to:  time, person, and place  Attention Span and Concentration:  intact  Recent and Remote Memory:  intact  Fund of Knowledge:  appropriate         Precautions:[TP1.1]     Behavioral Orders   Procedures     Fall precautions[TP1.2]          Diagnoses:      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 Luis Doyle.  At that time Dr. Doyle had made recommendations to trial olanzapine and tapered bupropion.  At this point in time bupropion is in a taper as  recommended by pharmacy.  I do not see any significant change in the patient's behavior.  I agree with Dr. Doyle that the presentation is most consistent with an organic cause to his hallucinations.  As indicated by neurology and MRI will be helpful in ascertaining the exact nature of the possible organic cause.  Differential is broad and further studies will need to be obtained.  Most likely the lab result was a false positive for amphetamines due to his bupropion usage.    Recommendations:  1.  Primary team may consider increasing olanzapine as tolerated by patient consider 7.5 mg nightly.  Given his fall risk however this will need to be balanced for possible orthostasis.  2.  Continue with bupropion taper is recommended initially by Dr. Doyle.  3.  Continue with neurology recommendations.  4.  At this time there is no indication that this is a primary psychiatric disorder that would indicate need for treatment on the psychiatric unit.    Please contact psychiatry with any further questions related to this case.[TP1.1]          Revision History        User Key Date/Time User Provider Type Action    > TP1.2 1/29/2018  1:22 PM Freedom Mcneil MD Physician Sign     TP1.1 1/29/2018  1:04 PM Freedom Mcneil MD Physician             Consults by Freedom Mcneil MD at 1/29/2018  9:30 AM     Author:  Freedom Mcneil MD Service:  Psychiatry Author Type:  Physician    Filed:  1/29/2018  9:52 AM Date of Service:  1/29/2018  9:30 AM Creation Time:  1/29/2018  9:52 AM    Status:  Signed :  Freedom Mcneil MD (Physician)     Consult Orders:    1. Psychiatry IP Consult: Follow up visit; Consultant may enter orders: Yes; Patient to be seen: Routine; Call back #: [id number] [[id number]] ordered by Denilson Edmondson MD at 01/29/18 0740                Follow-up consult completed. Dictation to follow.[TP1.1]     Revision History        User Key Date/Time User Provider Type Action    > TP1.1 1/29/2018  9:52 AM  Freedom Mcneil MD Physician Sign            Consults signed by Luis Doyle MD at 1/28/2018  6:38 AM      Author:  Luis Doyle MD Service:  Psychiatry Author Type:  Physician    Filed:  1/28/2018  6:38 AM Date of Service:  1/27/2018  9:13 PM Creation Time:  1/28/2018  1:20 AM    Status:  Signed :  Luis Doyle MD (Physician)         Consult Date:  01/27/2018      DATE OF SERVICE: 01/27/2018        REASON FOR CONSULTATION: The Medicine Service requested a consult to evaluate this patient for hallucinations.      HISTORY OF PRESENT ILLNESS:  The patient is a 77-year-old gentleman with a history of coronary artery disease, a distant history of alcohol dependence, hypertension, chronic kidney disease, COPD, obstructive sleep apnea who presents with persistent visual hallucinations over the past approximately a year.  The patient was admitted to [facility] on 01/11/2018 for the evaluation of hallucinations.  The day prior to coming in, he had called the police 5 times due to hallucinations.  He is followed by Psychiatry and has been on Seroquel and Zyprexa apparently without much effect on the hallucinations.  The patient's son-in-law and daughter brought him to the emergency room to be evaluated due to the increase in his  hallucinations and concern for his safety.  Initially there were thoughts of wanting to admit him to a geriatric psyche.  He currently lives alone, he is relatively independent.  He has done some things such as hold a  knife to his toes to clean the bugs off them; take a hammer to his scooter because one of his hallucinations is of a woman that has shrunk down inside the engine.  The  patient has had a number of medical issues, surgery and hospitalizations over the past year.      The patient was seen with his daughter.  He went on to describe hallucinations of animals and bugs.  There were descriptions then of people.  He has stated at one time he saw people  "on stilts.  He had the vision of a young woman nibbling on his toes.  Other hallucinatory experiences were the people that live at his extended care facility.  He went on to describe these but then began to express some delusional material, feeling that there was some type of cult involving the people in the extended care facility, at one point described ceremonies they have where they de-flower a young woman.  He stated that \"they like toes\".  He did appear to have difficulty with reality testing around this.  He felt that there certainly was a possibility of there being a cult with these various people from his extended care facility being involved in this.  Other hallucinatory experiences such as bugs or animals, he seemed less convinced that they were real, although by his behavior with a knife in his agitation as described, he certainly seems to have difficulty sorting out these hallucinatory experiences.      The patient denies depression, denies marked anxiety or being upset, except around these hallucinatory experiences.      PAST PSYCHIATRIC HISTORY:  The patient has no previous psychiatric history.      FAMILY PSYCHIATRIC HISTORY:  Negative.      CHEMICAL DEPENDENCY HISTORY:  The patient states that he drinks 1-2 drinks a day but has not drank in 12 days.  Per his daughter, he has a history of heavy alcohol use and she would describe him as an alcoholic, although he has cut down.  He also was a smoker and stopped smoking 7 to 8 years ago but stayed on Wellbutrin for unclear reasons after that time and currently is on 150 b.i.d.      PAST MEDICAL HISTORY:  Significant for chronic kidney disease, closed hip fracture, emphysema, coronary artery disease, obstructive sleep apnea.      SOCIAL HISTORY:  The patient is a former smoker of 1-1/2 packs a day for 40 years, quit in 2000, per report.  Does use alcohol as above.  He currently lives in an assisted care facility.      MEDICATIONS:  On admission were " reviewed and include, gabapentin, Lasix, bupropion 150 mg b.i.d., hydrocodone, Advair, Ventolin, atorvastatin, allopurinol.      REVIEW OF SYSTEMS:  A 10-point review of systems was performed.  Denies chest pain, shortness of breath, GI symptoms, fevers, chills, nausea, vomiting or other localizing neurological symptoms.      VITAL SIGNS:  Blood pressure 126/60, pulse 84, temperature 97.3, respirations 18.      MENTAL STATUS EXAMINATION:     GENERAL APPEARANCE AND BEHAVIOR:  The patient is an elderly gentleman sitting up eating his lunch.  He was generally cooperative with the exam but sometimes seemed to lose his interest in the process and would disengage.   MOOD AND AFFECT:  The patient denies depression.  Affect is mobile.   THOUGHT PROCESSES AND ASSOCIATIONS: Were within normal limits.   THOUGHT CONTENT:  Denies auditory or visual hallucinations at this time.  Denies suicidal ideation.   SPEECH AND LANGUAGE:  Within normal limits.     ATTENTION AND CONCENTRATION:  Appear generally intact.   ORIENTATION:  The patient is alert and oriented x3.     RECENT AND REMOTE MEMORY:  Appear generally intact, although he has had some difficulties with details at times.     FUND OF KNOWLEDGE:  Appears average.   JUDGMENT AND INSIGHT:  Appears adequate but limited in some ways in that he believes there are cults and things of this sort, which sound delusional.     MUSCLE BULK AND TONE:  Appears normal.     ABNORMAL MOVEMENTS: Not noted.      IMAGING:  CT scan from 05/17 was reviewed which was read as normal.        LABORATORY: Was reviewed.  Of note, his urine was positive for opiates and amphetamines.      IMPRESSION:  The patient is a 77-year-old gentleman with no previous psychiatric history who has an approximately 1-year history of visual hallucinations and some underlying delusional beliefs about these hallucinations.  He scored a 27/30 on the Mini-Mental Status Exam per the Neurology Service.  He did appear to be a  somewhat vague historian at times to me and to lack attention to the process at times.  It is unclear what the etiology of this is.  Obviously there is concern here about organic hallucinosis and organic psychosis as well as underlying neurocognitive disorder such as Lewy body disease.  Also, the patient has a long history of drinking and coronary artery disease.  It is unclear whether he has any microvascular disease that may be impacting his cognitive and psychiatric functioning.  Also he has been on Wellbutrin, which is a stimulant type drug and can also cause positive amphetamine screens.  He has been on antipsychotics, which seem to have been ineffective.      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      TREATMENT RECOMMENDATIONS:   1.  Would have the Occupational Therapy service come by and do a MOCA exam to check for frontal lobe functioning.  He does have a good MMSE, but if he does have more of a Lewy body presentation or some other underlying neurocognitive disorder, there may be frontal lobe systems involved.   2.  Agree with MRI as per Neurology.   3.  would consider further trials of atypical antipsychotics. He has been on Seroquel and Zyprexa in the past, although I do not know the dose.  It may be that he was insufficiently dosed. WOuld clarify this.  Consider trials of other agents.  4. Would taper and discontinue Wellbutrin.  Indications are unclear and can exacerbate psychotic symptoms.     5.  On the basis of MOCA, consider a Neuropsychology evaluation.     6.  The above was discussed with the patient's daughter and patient.   7.  Please call or reconsult with any questions, concerns or change in the patient's status.  My number is [phone].         ZULEYKA MAX MD             D: 01/27/2018   T: 01/28/2018   MT: MD      Name:     EMILIA ALMARAZ   MRN:      [id number]        Account:        [id number]   :      1940           Consult Date:  2018      Document: [id number][RW1.1]         Revision History        User Key Date/Time User Provider Type Action    > RW1.1 2018  6:38 AM Luis Doyle MD Physician Sign     [N/A] 2018  1:20 AM Luis Doyle MD Physician Edit            Consults by Luis Doyle MD at 2018  9:13 PM     Author:  Luis Doyle MD Service:  Psychiatry Author Type:  Physician    Filed:  2018  9:13 PM Date of Service:  2018  9:13 PM Creation Time:  2018  9:13 PM    Status:  Signed :  Luis Doyle MD (Physician)     Consult Orders:    1. Psychiatry IP Consult: Hallucinations; Consultant may enter orders: Yes; Patient to be seen: Routine; Call back #: [phone] [[id number]] ordered by Denilson Edmondson MD at 18 0814                Initial  Dictated[RW1.1]     Revision History        User Key Date/Time User Provider Type Action    > RW1.1 2018  9:13 PM Luis Doyle MD Physician Sign            Consults by Chung Beal MD at 2018 10:59 AM     Author:  Chung Beal MD Service:  Neurology Author Type:  Physician    Filed:  2018  8:14 PM Date of Service:  2018 10:59 AM Creation Time:  2018 10:57 AM    Status:  Signed :  Chung Beal MD (Physician)     Consult Orders:    1. Neurology General Adult IP Consult: Patient to be seen: Routine within 24 hrs; Call back #: [phone] hospital[facility]; worsening hallucinations eval for dementia with psychosis; Consultant may enter orders: Yes [[id number]] ordered by Ayo Chaidez MD at 18 0538                Consult requested regarding whether[KV1.1] visual[KV1.2] hallucinations could be secondary to dementia    Chief Complaint:[KV1.1] Daily visual hallucinations[KV1.2]    History of Present Illness:  Mr. Diaz is a 77 year old[KV1.1] left[KV1.2]-handed male presenting for evaluation of[KV1.1] visual hallucinations since Spring 2017[KV1.2].  H[KV1.1]ty is a good historian and his daughter Mackenzie is also present to give additional details.    Mr. Diaz was in his usual state of health, having retired 20 yrs ago, and living alone with minimal care until the spring 2017 at which time he developed visual hallucinations in the setting of a UTI. Since that time the hallucinations have continued during and between intercurrent illnesses, which have included left hip fracture requiring oxycodone and treatment of staph infection,[KV1.2] AND[KV1.3] UTI treated with Bactrim[KV1.2].[KV1.3]     The main hallucination is of a young woman in her 20s, professionally dressed, like an , who visits him day or night. She is sometimes accompanied by children or tall adults. He says the woman wants to be in a relationship with him. Last night she crawled under his bed and reached through the bed to grab him.  He has illusions of squirrels or bears when reading or watching TV.[KV1.2]  In addition he has seen bugs, and had a knife taken away because he was trying to cut his toe to get the bugs out.[KV1.3] The hallucinations become more threatening and violent at night and he has called the police about them several times. They diminish when he goes to common areas with other people around. They are also exacerbated by sleep deprivation and infections. Treatment with Seroquel and more recently Zyprexa has not improved the hallucinations.     Initial workup included seeing an ophthalmologist who diagnosed him with floaters in the left eye and a cataract that needs to be removed in the right eye. Of note, he reportedly does not have macular degeneration and the ophthalmologist does not think he is experiencing Malcom-Bonnet syndrome.     Regarding other cognitive symptoms, he has not experienced any memory loss, visuospatial changes outside of hallucinations, or language changes. He remembers newly learned information such as dances and plays of his grandchildren. He  recognizes faces of family members. Executive function is difficult to froy because he spends much of his days watching TV.     His motor function has declined as he recovers from a left hip replacement, but he has had no changes in facial expression, posture, or gait. He has symptoms of restless legs, but no witness dream enactment or limb movement in sleep, although sleeps alone. He has some reduction in fine motor skills. He no longer drives.[KV1.2] He fell back on his head a few weeks ago with no concussive symptoms or LOC. Head CT at that time was reportedly negative.[KV1.3]     Medical review of systems:[KV1.1] He lost about 40 lbs after hip surgery. Has good appetite.[KV1.2] The comprehensive[KV1.1] 10-point r[KV1.3]eview of systems is otherwise reviewed and negative.[KV1.1]     Patient Active Problem List   Diagnosis     Failure to thrive in adult     Hallucination, drug-induced (H)     Hallucinations     Past Medical History:   Diagnosis Date     Chronic kidney disease      Closed left hip fracture (H)      Emphysema of lung (H)[KV1.4]    CAD  Chronic cough  Retention of urine  Cystitis  Essential hypertension[KV1.2]    Past Surgical History:   Procedure Laterality Date     BACK SURGERY      neck, back      CARDIAC SURGERY      stent X1[KV1.4]      Medications[KV1.2]  Current Facility-Administered Medications   Medication     acetaminophen (TYLENOL) tablet 650 mg     albuterol (PROAIR HFA/PROVENTIL HFA/VENTOLIN HFA) Inhaler 2 puff     atenolol (TENORMIN) tablet 50 mg     allopurinol (ZYLOPRIM) tablet 100 mg     atorvastatin (LIPITOR) tablet 20 mg     buPROPion (WELLBUTRIN SR) 12 hr tablet 150 mg     fluticasone-vilanterol (BREO ELLIPTA) 200-25 MCG/INH oral inhaler 1 puff     furosemide (LASIX) tablet 20 mg     multivitamin, therapeutic with minerals (THERA-VIT-M) tablet 1 tablet     tamsulosin (FLOMAX) capsule 0.4 mg     clindamycin (CLEOCIN) capsule 300 mg     fluticasone (FLONASE) 50 MCG/ACT spray 2 spray      OLANZapine (zyPREXA) tablet 5 mg     naloxone (NARCAN) injection 0.1-0.4 mg     melatonin tablet 1 mg     lidocaine 1 % 1 mL     lidocaine (LMX4) kit     sodium chloride (PF) 0.9% PF flush 3 mL     sodium chloride (PF) 0.9% PF flush 3 mL     Contraindications to both pharmacological and mechanical prophylaxis (must document contraindications for both in this order)     senna-docusate (SENOKOT-S;PERICOLACE) 8.6-50 MG per tablet 1 tablet    Or     senna-docusate (SENOKOT-S;PERICOLACE) 8.6-50 MG per tablet 2 tablet     polyethylene glycol (MIRALAX/GLYCOLAX) Packet 17 g     cefTRIAXone (ROCEPHIN) 1 g in 10 mL SWFI Premix Syringe     ipratropium - albuterol 0.5 mg/2.5 mg/3 mL (DUONEB) neb solution 3 mL     potassium chloride SA (K-DUR/KLOR-CON M) CR tablet 20-40 mEq     potassium chloride (KLOR-CON) Packet 20-40 mEq     potassium chloride 10 mEq in 100 mL sterile water intermittent infusion (premix)     potassium chloride 10 mEq in 100 mL intermittent infusion with 10 mg lidocaine     potassium chloride 20 mEq in 50 mL intermittent infusion     hypromellose-dextran (ARTIFICAL TEARS) ophthalmic solution 1 drop[KV1.5]          Allergies:[KV1.2] No Known Allergies[KV1.4]    Family History:   Both parents  at a young age, when the patient was a teenager. Father had cancer and committed suicide, mother  in car accident. Is middle child of 10. Three sisters  with Alzheimer's disease diagnosed in older age. Has two daughters.[KV1.2]     Social History     Social History     Marital status:      Spouse name: N/A     Number of children: N/A     Years of education: N/A     Occupational History     Not on file.     Social History Main Topics     Smoking status: Former Smoker     Packs/day: 1.50     Years: 40.00     Quit date: 2000     Smokeless tobacco: Never Used     Alcohol use Yes      Comment: none recently     Drug use: No     Sexual activity: Not on file     Other Topics Concern     Not on file      Social History Narrative[KV1.4]   High school education, worked as , retired 20 years ago. No history of LSD use. Denies recreational drugs.[KV1.2]    General Physical examination:[KV1.1]  BP 91/40 (BP Location: Right arm)  Pulse 74  Temp 97.4  F (36.3  C) (Axillary)  Resp 16  Wt 73.9 kg (163 lb)  SpO2 94%  BMI 27.12 kg/m2[KV1.4]     General: Mr. Diaz is[KV1.1] mildly overweight,[KV1.2] and is appropriately groomed and dressed.  Chest: Clear to auscultation bilaterally.  Heart: Regular rhythm with no murmurs, rubs, or gallops.  Ext: warm, no edema  Skin: clean[KV1.1], several abrasions on legs with bandages, and dusky appearance of calves.[KV1.3]    Mental status: Mr. Diaz  is awake, alert, and appropriate, with fluent speech, no word finding difficulties and no difficulty in answering questions. He is well oriented to time, place and person. His memory for remote events is intact. His memory for recent events is normal. Attention and concentration are intact. His fund of knowledge is fair. No apraxia is noted.[KV1.1]  On MMSE he scored 27/30 with points deducted for orientation (-1 for floor), recall (-1), and copying intersecting polygons (-1)[KV1.3]  Cranial nerves: Pupils are equal, round and reactive to light bilaterally. Visual fields are full to confrontation with no visual extinction. Extraocular movements are intact[KV1.1] except for slightly impaired upgaze[KV1.3]. Smooth pursuit is intact. Saccades are normal in initiation, velocity and amplitude both vertically and horizontally. Facial sensation is intact to light touch. Facial strength is full bilaterally. Hearing is intact to finger rub bilaterally. The tongue protrudes in the midline with intact strength. There are no tongue fasciculations noted. The soft palate elevates symmetrically. Sternocledomastoid and trapezius muscle strength is full bilaterally.  Motor examination: Bulk is normal throughout. Axial and upper and  lower extremity tone is normal. No pronator drift is noted. Strength is 5/5 and symmetric throughout. No fasciculations are noted. There is no tremor or other involuntary movement.  Sensory examination: Sensation is[KV1.1] diminished to cold and vibration in the left lower leg and[KV1.3] intact to proprioception.   Reflexes: Reflexes[KV1.1] are[KV1.3] 2+ at biceps, triceps, and brachioradialis. Patellar reflexes are 2+[KV1.1] on left and 1+ on right[KV1.3]. Achilles reflexes are[KV1.1] 2+ on left and 1+ on right[KV1.3]. Plantar response is flexor bilaterally.  Coordination: Finger-nose-finger[KV1.1] i[KV1.3]s normal with no dysmetria bilaterally.[KV1.1]  Slightly slowed finger tapping and ope[KV1.3]ariana and closing of fist[KV1.1] bilaterally. Full movements when pantomiming screwing in light bulb above hand.[KV1.3] Foot tapping is not slowed.  Gait: He has[KV1.1] a mildly stooped posture[KV1.3] and steady stance[KV1.1], using walker,[KV1.3] with normal stride length and arm swing.     Labs:  Admission on 01/26/2018   Component Date Value Ref Range Status     Amphetamine Qual Urine 01/27/2018 Positive* NEG^Negative Final     Barbiturates Qual Urine 01/27/2018 Negative  NEG^Negative Final     Benzodiazepine Qual Urine 01/27/2018 Negative  NEG^Negative Final     Cannabinoids Qual Urine 01/27/2018 Negative  NEG^Negative Final     Cocaine Qual Urine 01/27/2018 Negative  NEG^Negative Final     Ethanol Qual Urine 01/27/2018 Negative  NEG^Negative Final     Opiates Qualitative Urine 01/27/2018 Positive* NEG^Negative Final     Sodium 01/27/2018 141  133 - 144 mmol/L Final     Potassium 01/27/2018 3.3* 3.4 - 5.3 mmol/L Final     Chloride 01/27/2018 103  94 - 109 mmol/L Final     Carbon Dioxide 01/27/2018 29  20 - 32 mmol/L Final     Anion Gap 01/27/2018 9  3 - 14 mmol/L Final     Glucose 01/27/2018 100* 70 - 99 mg/dL Final     Urea Nitrogen 01/27/2018 23  7 - 30 mg/dL Final     Creatinine 01/27/2018 1.11  0.66 - 1.25 mg/dL  Final     GFR Estimate 01/27/2018 64  >60 mL/min/1.7m2 Final     GFR Estimate If Black 01/27/2018 77  >60 mL/min/1.7m2 Final     Calcium 01/27/2018 9.1  8.5 - 10.1 mg/dL Final     Bilirubin Total 01/27/2018 0.4  0.2 - 1.3 mg/dL Final     Albumin 01/27/2018 2.7* 3.4 - 5.0 g/dL Final     Protein Total 01/27/2018 7.5  6.8 - 8.8 g/dL Final     Alkaline Phosphatase 01/27/2018 171* 40 - 150 U/L Final     ALT 01/27/2018 27  0 - 70 U/L Final     AST 01/27/2018 33  0 - 45 U/L Final     WBC 01/27/2018 13.4* 4.0 - 11.0 10e9/L Final     RBC Count 01/27/2018 4.31* 4.4 - 5.9 10e12/L Final     Hemoglobin 01/27/2018 11.4* 13.3 - 17.7 g/dL Final     Hematocrit 01/27/2018 36.6* 40.0 - 53.0 % Final     MCV 01/27/2018 85  78 - 100 fl Final     MCH 01/27/2018 26.5  26.5 - 33.0 pg Final     MCHC 01/27/2018 31.1* 31.5 - 36.5 g/dL Final     RDW 01/27/2018 16.3* 10.0 - 15.0 % Final     Platelet Count 01/27/2018 332  150 - 450 10e9/L Final     Diff Method 01/27/2018 Automated Method   Final     % Neutrophils 01/27/2018 73.7  % Final     % Lymphocytes 01/27/2018 11.8  % Final     % Monocytes 01/27/2018 11.5  % Final     % Eosinophils 01/27/2018 2.6  % Final     % Basophils 01/27/2018 0.2  % Final     % Immature Granulocytes 01/27/2018 0.2  % Final     Nucleated RBCs 01/27/2018 0  0 /100 Final     Absolute Neutrophil 01/27/2018 9.9* 1.6 - 8.3 10e9/L Final     Absolute Lymphocytes 01/27/2018 1.6  0.8 - 5.3 10e9/L Final     Absolute Monocytes 01/27/2018 1.6* 0.0 - 1.3 10e9/L Final     Absolute Eosinophils 01/27/2018 0.4  0.0 - 0.7 10e9/L Final     Absolute Basophils 01/27/2018 0.0  0.0 - 0.2 10e9/L Final     Abs Immature Granulocytes 01/27/2018 0.0  0 - 0.4 10e9/L Final     Absolute Nucleated RBC 01/27/2018 0.0   Final     TSH 01/27/2018 0.89  0.40 - 4.00 mU/L Final     Color Urine 01/27/2018 Yellow   Final     Appearance Urine 01/27/2018 Slightly Cloudy   Final     Glucose Urine 01/27/2018 Negative  NEG^Negative mg/dL Final     Bilirubin Urine  01/27/2018 Negative  NEG^Negative Final     Ketones Urine 01/27/2018 Negative  NEG^Negative mg/dL Final     Specific Gravity Urine 01/27/2018 1.016  1.003 - 1.035 Final     Blood Urine 01/27/2018 Small* NEG^Negative Final     pH Urine 01/27/2018 5.5  5.0 - 7.0 pH Final     Protein Albumin Urine 01/27/2018 30* NEG^Negative mg/dL Final     Urobilinogen mg/dL 01/27/2018 Normal  0.0 - 2.0 mg/dL Final     Nitrite Urine 01/27/2018 Negative  NEG^Negative Final     Leukocyte Esterase Urine 01/27/2018 Large* NEG^Negative Final     Source 01/27/2018 Midstream Urine   Final     RBC Urine 01/27/2018 12* 0 - 2 /HPF Final     WBC Urine 01/27/2018 >182* 0 - 2 /HPF Final     Bacteria Urine 01/27/2018 Few* NEG^Negative /HPF Final     Squamous Epithelial /HPF Urine 01/27/2018 1  0 - 1 /HPF Final     Specimen Description 01/27/2018 Midstream Urine   Final     Special Requests 01/27/2018 Specimen received in preservative   Preliminary     Culture Micro 01/27/2018 PENDING   Preliminary   Admission on 05/31/2017, Discharged on 06/03/2017   Component Date Value Ref Range Status     WBC 05/31/2017 10.5  4.0 - 11.0 10e9/L Final     RBC Count 05/31/2017 4.75  4.4 - 5.9 10e12/L Final     Hemoglobin 05/31/2017 14.4  13.3 - 17.7 g/dL Final     Hematocrit 05/31/2017 43.2  40.0 - 53.0 % Final     MCV 05/31/2017 91  78 - 100 fl Final     MCH 05/31/2017 30.3  26.5 - 33.0 pg Final     MCHC 05/31/2017 33.3  31.5 - 36.5 g/dL Final     RDW 05/31/2017 13.3  10.0 - 15.0 % Final     Platelet Count 05/31/2017 327  150 - 450 10e9/L Final     Diff Method 05/31/2017 Automated Method   Final     % Neutrophils 05/31/2017 76.2  % Final     % Lymphocytes 05/31/2017 11.1  % Final     % Monocytes 05/31/2017 11.2  % Final     % Eosinophils 05/31/2017 1.0  % Final     % Basophils 05/31/2017 0.2  % Final     % Immature Granulocytes 05/31/2017 0.3  % Final     Nucleated RBCs 05/31/2017 0  0 /100 Final     Absolute Neutrophil 05/31/2017 8.0  1.6 - 8.3 10e9/L Final      Absolute Lymphocytes 05/31/2017 1.2  0.8 - 5.3 10e9/L Final     Absolute Monocytes 05/31/2017 1.2  0.0 - 1.3 10e9/L Final     Absolute Eosinophils 05/31/2017 0.1  0.0 - 0.7 10e9/L Final     Absolute Basophils 05/31/2017 0.0  0.0 - 0.2 10e9/L Final     Abs Immature Granulocytes 05/31/2017 0.0  0 - 0.4 10e9/L Final     Absolute Nucleated RBC 05/31/2017 0.0   Final     Sodium 05/31/2017 137  133 - 144 mmol/L Final     Potassium 05/31/2017 4.0  3.4 - 5.3 mmol/L Final     Chloride 05/31/2017 102  94 - 109 mmol/L Final     Carbon Dioxide 05/31/2017 27  20 - 32 mmol/L Final     Anion Gap 05/31/2017 8  3 - 14 mmol/L Final     Glucose 05/31/2017 86  70 - 99 mg/dL Final     Urea Nitrogen 05/31/2017 21  7 - 30 mg/dL Final     Creatinine 05/31/2017 1.33* 0.66 - 1.25 mg/dL Final     GFR Estimate 05/31/2017 52* >60 mL/min/1.7m2 Final     GFR Estimate If Black 05/31/2017 63  >60 mL/min/1.7m2 Final     Calcium 05/31/2017 9.7  8.5 - 10.1 mg/dL Final     Color Urine 05/31/2017 Yellow   Final     Appearance Urine 05/31/2017 Clear   Final     Glucose Urine 05/31/2017 Negative  NEG mg/dL Final     Bilirubin Urine 05/31/2017 Negative  NEG Final     Ketones Urine 05/31/2017 15* NEG mg/dL Final     Specific Gravity Urine 05/31/2017 1.015  1.003 - 1.035 Final     Blood Urine 05/31/2017 Negative  NEG Final     pH Urine 05/31/2017 6.0  5.0 - 7.0 pH Final     Protein Albumin Urine 05/31/2017 Negative  NEG mg/dL Final     Urobilinogen Urine 05/31/2017 0.2  0.2 - 1.0 EU/dL Final     Nitrite Urine 05/31/2017 Negative  NEG Final     Leukocyte Esterase Urine 05/31/2017 Trace* NEG Final     Source 05/31/2017 Midstream Urine   Final     WBC Urine 05/31/2017 2-5* 0 - 2 /HPF Final     RBC Urine 05/31/2017 O - 2  0 - 2 /HPF Final     Bacteria Urine 05/31/2017 Few* NEG /HPF Final     WBC 05/31/2017 10.8  4.0 - 11.0 10e9/L Final     RBC Count 05/31/2017 4.28* 4.4 - 5.9 10e12/L Final     Hemoglobin 05/31/2017 13.0* 13.3 - 17.7 g/dL Final     Hematocrit  05/31/2017 38.6* 40.0 - 53.0 % Final     MCV 05/31/2017 90  78 - 100 fl Final     MCH 05/31/2017 30.4  26.5 - 33.0 pg Final     MCHC 05/31/2017 33.7  31.5 - 36.5 g/dL Final     RDW 05/31/2017 13.3  10.0 - 15.0 % Final     Platelet Count 05/31/2017 287  150 - 450 10e9/L Final     Sodium 06/01/2017 139  133 - 144 mmol/L Final     Potassium 06/01/2017 3.9  3.4 - 5.3 mmol/L Final     Chloride 06/01/2017 104  94 - 109 mmol/L Final     Carbon Dioxide 06/01/2017 25  20 - 32 mmol/L Final     Anion Gap 06/01/2017 10  3 - 14 mmol/L Final     Glucose 06/01/2017 93  70 - 99 mg/dL Final     Urea Nitrogen 06/01/2017 21  7 - 30 mg/dL Final     Creatinine 06/01/2017 1.32* 0.66 - 1.25 mg/dL Final     GFR Estimate 06/01/2017 52* >60 mL/min/1.7m2 Final     GFR Estimate If Black 06/01/2017 63  >60 mL/min/1.7m2 Final     Calcium 06/01/2017 9.2  8.5 - 10.1 mg/dL Final     Interpretation ECG 06/02/2017 Click View Image link to view waveform and result   Final     Sodium 06/03/2017 142  133 - 144 mmol/L Final     Potassium 06/03/2017 3.5  3.4 - 5.3 mmol/L Final     Chloride 06/03/2017 107  94 - 109 mmol/L Final     Carbon Dioxide 06/03/2017 26  20 - 32 mmol/L Final     Anion Gap 06/03/2017 9  3 - 14 mmol/L Final     Glucose 06/03/2017 195* 70 - 99 mg/dL Final     Urea Nitrogen 06/03/2017 30  7 - 30 mg/dL Final     Creatinine 06/03/2017 1.19  0.66 - 1.25 mg/dL Final     GFR Estimate 06/03/2017 59* >60 mL/min/1.7m2 Final     GFR Estimate If Black 06/03/2017 71  >60 mL/min/1.7m2 Final     Calcium 06/03/2017 9.2  8.5 - 10.1 mg/dL Final     Phosphorus 06/03/2017 3.0  2.5 - 4.5 mg/dL Final   Admission on 05/30/2017, Discharged on 05/30/2017   Component Date Value Ref Range Status     Color Urine 05/30/2017 Yellow   Final     Appearance Urine 05/30/2017 Clear   Final     Glucose Urine 05/30/2017 Negative  NEG mg/dL Final     Bilirubin Urine 05/30/2017 Negative  NEG Final     Ketones Urine 05/30/2017 Trace* NEG mg/dL Final     Specific Gravity  Urine 05/30/2017 1.020  1.003 - 1.035 Final     Blood Urine 05/30/2017 Negative  NEG Final     pH Urine 05/30/2017 6.0  5.0 - 7.0 pH Final     Protein Albumin Urine 05/30/2017 Negative  NEG mg/dL Final     Urobilinogen Urine 05/30/2017 0.2  0.2 - 1.0 EU/dL Final     Nitrite Urine 05/30/2017 Negative  NEG Final     Leukocyte Esterase Urine 05/30/2017 Trace* NEG Final     Source 05/30/2017 Midstream Urine   Final     Sodium 05/30/2017 137  133 - 144 mmol/L Final     Potassium 05/30/2017 3.9  3.4 - 5.3 mmol/L Final     Chloride 05/30/2017 102  94 - 109 mmol/L Final     Carbon Dioxide 05/30/2017 25  20 - 32 mmol/L Final     Anion Gap 05/30/2017 10  3 - 14 mmol/L Final     Glucose 05/30/2017 90  70 - 99 mg/dL Final     Urea Nitrogen 05/30/2017 21  7 - 30 mg/dL Final     Creatinine 05/30/2017 1.22  0.66 - 1.25 mg/dL Final     GFR Estimate 05/30/2017 57* >60 mL/min/1.7m2 Final     GFR Estimate If Black 05/30/2017 69  >60 mL/min/1.7m2 Final     Calcium 05/30/2017 9.1  8.5 - 10.1 mg/dL Final     WBC Urine 05/30/2017 O - 2  0 - 2 /HPF Final     RBC Urine 05/30/2017 O - 2  0 - 2 /HPF Final   Admission on 08/24/2016, Discharged on 08/24/2016   Component Date Value Ref Range Status     WBC 08/24/2016 17.1* 4.0 - 11.0 10e9/L Final     RBC Count 08/24/2016 4.67  4.4 - 5.9 10e12/L Final     Hemoglobin 08/24/2016 14.6  13.3 - 17.7 g/dL Final     Hematocrit 08/24/2016 43.0  40.0 - 53.0 % Final     MCV 08/24/2016 92  78 - 100 fl Final     MCH 08/24/2016 31.3  26.5 - 33.0 pg Final     MCHC 08/24/2016 34.0  31.5 - 36.5 g/dL Final     RDW 08/24/2016 13.3  10.0 - 15.0 % Final     Platelet Count 08/24/2016 229  150 - 450 10e9/L Final     Diff Method 08/24/2016 Automated Method   Final     % Neutrophils 08/24/2016 82.3  % Final     % Lymphocytes 08/24/2016 6.1  % Final     % Monocytes 08/24/2016 10.3  % Final     % Eosinophils 08/24/2016 0.5  % Final     % Basophils 08/24/2016 0.2  % Final     % Immature Granulocytes 08/24/2016 0.6  % Final      Nucleated RBCs 08/24/2016 0  0 /100 Final     Absolute Neutrophil 08/24/2016 14.1* 1.6 - 8.3 10e9/L Final     Absolute Lymphocytes 08/24/2016 1.1  0.8 - 5.3 10e9/L Final     Absolute Monocytes 08/24/2016 1.8* 0.0 - 1.3 10e9/L Final     Absolute Eosinophils 08/24/2016 0.1  0.0 - 0.7 10e9/L Final     Absolute Basophils 08/24/2016 0.0  0.0 - 0.2 10e9/L Final     Abs Immature Granulocytes 08/24/2016 0.1  0 - 0.4 10e9/L Final     Absolute Nucleated RBC 08/24/2016 0.0   Final     Sodium 08/24/2016 139  133 - 144 mmol/L Final     Potassium 08/24/2016 3.7  3.4 - 5.3 mmol/L Final     Chloride 08/24/2016 103  94 - 109 mmol/L Final     Carbon Dioxide 08/24/2016 30  20 - 32 mmol/L Final     Anion Gap 08/24/2016 6  3 - 14 mmol/L Final     Glucose 08/24/2016 113* 70 - 99 mg/dL Final     Urea Nitrogen 08/24/2016 22  7 - 30 mg/dL Final     Creatinine 08/24/2016 1.23  0.66 - 1.25 mg/dL Final     GFR Estimate 08/24/2016 57* >60 mL/min/1.7m2 Final     GFR Estimate If Black 08/24/2016 69  >60 mL/min/1.7m2 Final     Calcium 08/24/2016 8.7  8.5 - 10.1 mg/dL Final   Admission on 08/24/2016, Discharged on 08/24/2016   Component Date Value Ref Range Status     Color Urine 08/24/2016 Yellow   Final     Appearance Urine 08/24/2016 Slightly Cloudy   Final     Glucose Urine 08/24/2016 Negative  NEG mg/dL Final     Bilirubin Urine 08/24/2016 Negative  NEG Final     Ketones Urine 08/24/2016 Negative  NEG mg/dL Final     Specific Gravity Urine 08/24/2016 1.015  1.003 - 1.035 Final     Blood Urine 08/24/2016 Large* NEG Final     pH Urine 08/24/2016 5.5  5.0 - 7.0 pH Final     Protein Albumin Urine 08/24/2016 30* NEG mg/dL Final     Urobilinogen mg/dL 08/24/2016 Normal  0.0 - 2.0 mg/dL Final     Nitrite Urine 08/24/2016 Positive* NEG Final     Leukocyte Esterase Urine 08/24/2016 Large* NEG Final     Source 08/24/2016 Catheterized Urine   Final     RBC Urine 08/24/2016 177* 0 - 2 /HPF Final     WBC Urine 08/24/2016 100* 0 - 2 /HPF Final     WBC  Clumps 08/24/2016 Present* NEG /HPF Final     Specimen Description 08/24/2016 Catheterized Urine   Final     Culture Micro 08/24/2016 >100,000 colonies/mL Klebsiella pneumoniae*  Final     Micro Report Status 08/24/2016 FINAL 08/26/2016   Final     Organism: 08/24/2016 >100,000 colonies/mL Klebsiella pneumoniae   Final     Imaging:[KV1.1]  CT head 5/31/17, reviewed in PACS: head is tilted to left. Asymmetric anterior temporal horns of lateral ventrical, larger on right. Hippocampal formations appear about the same size except in the most anterior sections where the right hippocampus is slightly smaller. Cortical volume appears normal for age.[KV1.3]     Impression:  Mr. Diaz is a 77 year old[KV1.1] left-[KV1.3]handed male who presents with[KV1.1] visual hallucinations since spring 2017, initially in setting of UTI, but persisting since then during and between intercurrent illnesses. On exam he has brisker reflexes on the left and diminished sensation to cold and vibration in the left lower leg. Cognitive testing reveals MMSE 27/30 with notable difficulty coping[KV1.3] ntersectin[KV1.1]g polygons. The head CT from May 2017 does not reveal a lesion or regional atrophy to account for the visual symptoms.  Labs are notable for positive amphetamines on tox screen. He denies recreational drugs. The differential dx for his symptoms includes amphetamine use, Malcom-Bonnet syndrome, and dementia with Lewy bodies. His history of visual illusions and hallucinations and exam features of difficulty drawing intersecting polygons suggest that DLB involving cortical structures without motor signs of Parkinson's disease, is possible. The symptoms persisting in daytime make Malcom-Bonnet syndrome less likely. The well formed hallucinsations are unlikely to be seizures. Further imaging is warranted to workup the hallucinations and asymmetric findings on exam.[KV1.3]     Recommendations:  1.[KV1.1] Brain MRI  2. After MRI  completed, if no lesions discovered that would explain symptoms, and if chronic amphetamine use is ruled out, may start donepezil trial to see if hallucinations improve. Would begin at 5 mg qam and increase to 10 mg a day after 6 weeks.   3. If chronic amphetamine use is ruled out, he should be seen in outpatient follow up with general neurology to assess for interval change. I am seeing new patients in [facility], and would be happy to see him as well. We are currently booked out for 6 months.   4. Outpatient neuropsychological testing to better assess his visual and non-visual cognitive abilities. Would suggest that he see Dr. Kimani Abbott in the [facility].     Thank you for the opportunity to be involved in the care of this interesting patient.[KV1.3]        Revision History        User Key Date/Time User Provider Type Action    > KV1.3 1/27/2018  8:14 PM Chung Beal MD Physician Sign     KV1.5 1/27/2018  1:03 PM Chung Beal MD Physician      KV1.2 1/27/2018 12:24 PM Chung Beal MD Physician      KV1.4 1/27/2018 10:58 AM Chung Beal MD Physician      KV1.1 1/27/2018 10:57 AM Chung Beal MD Physician                      Progress Notes - Physician (Notes for yesterday and today)      Progress Notes by Jose Raul Santamaria MD at 2/5/2018 11:34 AM     Author:  Jose Raul Santamaria MD Service:  Internal Medicine Author Type:  Physician    Filed:  2/5/2018  5:39 PM Date of Service:  2/5/2018 11:34 AM Creation Time:  2/5/2018 11:34 AM    Status:  Signed :  Jose Raul Santamaria MD (Physician)         [facility], [place]   Internal Medicine Daily Note           Interval History/Events     Overnight events reviewed  No new concern  Hallucinations not as threatening as prior. Sleeping better  No fever, chills  No NV. remigio orals.   No LH or dizziness.   No cp or sob.  No focal s/s.        Review of Systems        4 point ROS including Respiratory, CV,  "GI and , other than that noted above is negative      Medications   I have reviewed current medications  in the \"current medication\" section of Epic.  Relevant changes include:     Physical Exam         Vital signs:    Blood pressure 121/59, pulse 94, temperature 96.3  F (35.7  C), resp. rate 16, weight 77.3 kg (170 lb 6.4 oz), SpO2 95 %.  Estimated body mass index is 28.36 kg/(m^2) as calculated from the following:    Height as of 5/31/17: 1.651 m (5' 5\").    Weight as of this encounter: 77.3 kg (170 lb 6.4 oz).        General: NAD  HEENT: No icterus, no pallor. At/nc  Cardiovascular: S1, S2 normal  Respiratory:  Non labored. Mild bl end exp wheeze.  GI/Abdomen: Soft, NT,ND  Neurology: Alert, awake, and oriented to time, place and person.   No tremors or rigidity.   Extremities: Mild pre tibial edema. Skin: multiple skin abrasions, mepilex over open areas LE   Psychiatry: pleasant.      Laboratory and Imaging Studies     I have reviewed  laboratory and imaging studies in the Epic. Pertinent findings are as below:    BMP    Recent Labs  Lab 02/05/18  0527 02/04/18  0626 02/03/18  0542 02/02/18  0622 02/01/18  0536   * 146* 144 145* 143   POTASSIUM  --  3.9 4.1 3.8 4.1   CHLORIDE  --  114* 110* 110* 107   LOU  --  8.1* 8.5 8.4* 8.4*   CO2  --  26 29 29 31   BUN  --  16 17 17 14   CR  --  0.96 1.04 1.08 1.06   GLC  --  96 100* 98 144*     CBC    Recent Labs  Lab 02/04/18  0626 02/03/18  0542 02/02/18  0622 02/01/18  0536   WBC 9.0 8.9 9.2 9.1   RBC 4.16* 4.64 4.46 4.67   HGB 10.9* 11.9* 11.3* 11.9*   HCT 35.8* 40.0 38.4* 39.7*   MCV 86 86 86 85   MCH 26.2* 25.6* 25.3* 25.5*   MCHC 30.4* 29.8* 29.4* 30.0*   RDW 16.4* 16.2* 16.2* 16.1*    339 327 363       MR Brain w/o Contrast 01/29:     Impression:    1. No acute intracranial pathology.  2. Mild cerebral volume loss with mild chronic small vessel ischemic change.  3. Mineralization of the basal ganglia, within expected limits for age.    Impression/Plan "      76 yo M with hx of HTN, CAD, COPD,  Gout, BPH s/P TURP, S/P THOMAS admitted for  worsening recurrent vs chronic hallucinations x ~ 1 year (since May 2017)       # Hallucinations: Unkown etiology . ? lewy body dementia  Drug screen positive for Amphetamine, and Opiates. Patient and family denies any h/o amphetamine or drug use. ? False positive 2/2 bupropion.   Patient reports regular use of Alcohol. CT head on 01/11 at OSH with no acute abnormality. TSH on 01/27 within normal limits. Vitamin B12, and Folic acid normal.   TPA negative.     Psychiatry and Neurology has evaluated the patient.  OT Cognition eval 01/28: Patient MOCA scoring 26/30 (score of 26 or greater is considered normal). Had mild difficulty with drawing time on clock (visuospatial/excutive), able to recall 4/5 words, and with abstraction (stating how watch and ruler are alike).      - MRI Brain : as above.   - 01/30: d/w Neurology: rec: aricept 5 and fu at Neurology clinic in 1 month. Did not start as patient started on rivastigmine patch   - 01/30: Psychiatry fu consult reviewed. Noted psychiatry started patient on rivastigmine 1/30/2018.     1/31/2018: Continue rivastigmine for now. May be titrated as tolerated.   Dc Olanzepine and taper off bupropion as planned earlier,  as no evidence that it was helping the patient symptoms and possible making worse.    per psychiatry,not a candidate for [facility] psychiatry unit at current.     - FU with Psychiatry inpatient and outpatient prn.      - Neuro: Outpatient neuropsychological testing to better assess his visual and non-visual cognitive abilities. Would suggest that he see Dr. Kimani Abbott in the [facility].   However patient daughter Ms Ortiz wants him to fu with Neurology at [facility].     - Continue to monitor using VPM.  - a/w Placement. Sw working with the family.     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 Urine culture without growth, and patient had no urinary symptoms thus Ceftriaxone was discontinued.   - Continue to monitor.    # BPH s/p TURP on 10/30/2017: On Tamsulosin  - Continue   - Scheduled to follow up with urology on 3/9/2018    # Hx of COPD: PTA on Advair Diskus  - Continue LABA+ICS    # Hx of CAD (s/p coronary stent placement in 2003): On Atenolol. Not on Aspirin. Not sure why.   - Continue Atenolol  - Continue Aspirin    # Hx of SANTIAGO: Continue CPAP    # Hx of Hyperlipidemia: PTA Atorvastatin  - Continue    # PTA on Furosemide: No past hx of HF noted. Patient unsure of exact reason  - Cr slightly trending up.   - 02/01: will hold for now.   - encourage oral fluids.   - fu BMP  - I/o as able.       # FEN: Regular diet  # Activity : Up with assist. PT consult  # Prophylaxis:  Ambulate with assist.   # Code status: Full     Disposition Plan   Expected discharge TCU vs Assisted living-- A/w placement.      Entered: Jose Raul Santamaria 02/05/2018, 11:34 AM   Sw on board for placement.             Pt's care was discussed with bedside RN, patient and  during Care Team Rounds.            Jose Raul Santamaria MD   Hospitalist (Internal Medicine)  [facility]  Pager: [phone].[SD1.1]                     Revision History        User Key Date/Time User Provider Type Action    > SD1.1 2/5/2018  5:39 PM Jose Raul Santamaria MD Physician Sign                  Procedure Notes     No notes of this type exist for this encounter.         Progress Notes - Therapies (Notes from 02/03/18 through 02/06/18)      Progress Notes by Sima Zuniga PT at 2/5/2018 11:25 AM     Author:  Sima Zuniga PT Service:  (none) Author Type:  Physical Therapist    Filed:  2/5/2018 11:27 AM Date of Service:  2/5/2018 11:25 AM Creation Time:  2/5/2018 11:25 AM    Status:  Signed :  Sima Zuniga PT (Physical Therapist)         30-Second Sit to  Stand Test:  The test is conducted with a straight back chair, without armrests, with a 17-inch seat height.    Patient Score (reps): 0     The 30 Second Sit to Stand Test is considered a test of fall risk.  Data from [place] [employer]IZ[employer], cosponsored by [place] Dept of Health:  8 or less times = High Fall Risk   9 to 12 times = Moderate Risk  13 or more times = Low Risk    The 30 Second Sit to Stand Test is also considered a test of leg strength and endurance.   Normative Data from Alli et al,. 2001  Reps: Men       Reps: Women         Age  60-64                14-19                       12-17                               65-69                12-18                       11-16                    70-74                12-17                       10-15              75-79                11-17                       10-15                    80-84                10-15                         9-14  85-89                 8-14                          8-13  90-94                 7-12                          4-11    Assessment (rationale for performing, application to patient s function & care plan): Completed 30 second sit to stand to assess pt balance, pt scoring 0 due to inability to complete with use of B UE    Timed Up and Go (TUG): TUG is a test of basic mobility skills. It is used to screen individuals prone to falls.  Gait assistive device used: FWW     Patient score 21.25 seconds  ?13.5 seconds indicate at risk for falls in older adults according to Kathrin-Srinivasa et al 2000.  ?30 seconds - indicates dependency in most ADL and mobility skills according to Postiffanieo & Sage 1991    Minimal Detectable Change for patients with Alzheimer s = 4.09 sec   Minimal Detectable Change for patients with Parkinson s Disease = 3.5 sec   according to Kanu & Jeff Alves 2011    Assessment (rationale for performing, application to patient s function & care plan): Completed TUG to assess patient static and dynamic balance and fall risk;  "pt at high risk for falls scoring 21.25 seconds with use of FWW   Minutes billed as physical performance test: 15[KA1.1]          Revision History        User Key Date/Time User Provider Type Action    > KA1.1 2/5/2018 11:27 AM Sima Zuniga, PT Physical Therapist Sign                                                      INTERAGENCY TRANSFER FORM - LAB / IMAGING / EKG / EMG RESULTS   1/26/2018                       UR 8A: [phone]            Unresulted Labs (24h ago through future)    Start       Ordered    Unscheduled  Potassium  (Potassium Replacement - \"Standard\" - For K levels less than 3.4 mmol/L - UU,UR,UA,RH,SH,PH,WY )  CONDITIONAL (SPECIFY),   Routine     Comments:  Obtain Potassium Level for these conditions:  *IF no potassium result within 24 hours before initiation of order set, draw potassium level with next lab collect.    *2 HOURS AFTER last IV potassium replacement dose and 4 hours after an oral replacement dose.  *Next morning after potassium dose.     Repeat Potassium Replacement if necessary.    01/27/18 1209         Lab Results - 3 Days      Basic metabolic panel [[id number]] (Abnormal)  Resulted: 02/06/18 0548, Result status: Final result    Ordering provider: Jose Raul Santamaria MD  02/06/18 0001 Resulting lab: [facility]    Specimen Information    Type Source Collected On   Blood  02/06/18 0523          Components       Value Reference Range Flag Lab   Sodium 145 133 - 144 mmol/L H 13   Potassium 4.3 3.4 - 5.3 mmol/L  13   Chloride 110 94 - 109 mmol/L H 13   Carbon Dioxide 28 20 - 32 mmol/L  13   Anion Gap 7 3 - 14 mmol/L  13   Glucose 91 70 - 99 mg/dL  13   Urea Nitrogen 13 7 - 30 mg/dL  13   Creatinine 1.03 0.66 - 1.25 mg/dL  13   GFR Estimate 70 >60 mL/min/1.7m2  13   Comment:  Non  GFR Calc   GFR Estimate If Black 84 >60 mL/min/1.7m2  13   Comment:  African American GFR Calc   Calcium 8.0 8.5 - 10.1 mg/dL L 13            Sodium [[id number]] " (Abnormal)  Resulted: 02/05/18 0550, Result status: Final result    Ordering provider: Jose Raul Santamaria MD  02/05/18 0000 Resulting lab: [facility]    Specimen Information    Type Source Collected On   Blood  02/05/18 0527          Components       Value Reference Range Flag Lab   Sodium 146 133 - 144 mmol/L H 13            Basic metabolic panel [[id number]] (Abnormal)  Resulted: 02/04/18 0701, Result status: Final result    Ordering provider: Denilson Edmondson MD  02/04/18 0000 Resulting lab: [facility]    Specimen Information    Type Source Collected On   Blood  02/04/18 0626          Components       Value Reference Range Flag Lab   Sodium 146 133 - 144 mmol/L H 13   Potassium 3.9 3.4 - 5.3 mmol/L  13   Chloride 114 94 - 109 mmol/L H 13   Carbon Dioxide 26 20 - 32 mmol/L  13   Anion Gap 6 3 - 14 mmol/L  13   Glucose 96 70 - 99 mg/dL  13   Urea Nitrogen 16 7 - 30 mg/dL  13   Creatinine 0.96 0.66 - 1.25 mg/dL  13   GFR Estimate 76 >60 mL/min/1.7m2  13   Comment:  Non  GFR Calc   GFR Estimate If Black >90 >60 mL/min/1.7m2  13   Comment:  African American GFR Calc   Calcium 8.1 8.5 - 10.1 mg/dL L 13            CBC with platelets [[id number]] (Abnormal)  Resulted: 02/04/18 0647, Result status: Final result    Ordering provider: Denilson Edmondson MD  02/04/18 0000 Resulting lab: [facility]    Specimen Information    Type Source Collected On   Blood  02/04/18 0626          Components       Value Reference Range Flag Lab   WBC 9.0 4.0 - 11.0 10e9/L  13   RBC Count 4.16 4.4 - 5.9 10e12/L L 13   Hemoglobin 10.9 13.3 - 17.7 g/dL L 13   Hematocrit 35.8 40.0 - 53.0 % L 13   MCV 86 78 - 100 fl  13   MCH 26.2 26.5 - 33.0 pg L 13   MCHC 30.4 31.5 - 36.5 g/dL L 13   RDW 16.4 10.0 - 15.0 % H 13   Platelet Count 282 150 - 450 10e9/L  13            Basic metabolic panel [[id number]] (Abnormal)  Resulted: 02/03/18 0620, Result  status: Final result    Ordering provider: Denilson Edmondson MD  02/03/18 0000 Resulting lab: [facility]    Specimen Information    Type Source Collected On   Blood  02/03/18 0542          Components       Value Reference Range Flag Lab   Sodium 144 133 - 144 mmol/L  13   Potassium 4.1 3.4 - 5.3 mmol/L  13   Chloride 110 94 - 109 mmol/L H 13   Carbon Dioxide 29 20 - 32 mmol/L  13   Anion Gap 5 3 - 14 mmol/L  13   Glucose 100 70 - 99 mg/dL H 13   Urea Nitrogen 17 7 - 30 mg/dL  13   Creatinine 1.04 0.66 - 1.25 mg/dL  13   GFR Estimate 69 >60 mL/min/1.7m2  13   Comment:  Non  GFR Calc   GFR Estimate If Black 83 >60 mL/min/1.7m2  13   Comment:  African American GFR Calc   Calcium 8.5 8.5 - 10.1 mg/dL  13            CBC with platelets [[id number]] (Abnormal)  Resulted: 02/03/18 0608, Result status: Final result    Ordering provider: Denilson Edmondson MD  02/03/18 0000 Resulting lab: [facility]    Specimen Information    Type Source Collected On   Blood  02/03/18 0542          Components       Value Reference Range Flag Lab   WBC 8.9 4.0 - 11.0 10e9/L  13   RBC Count 4.64 4.4 - 5.9 10e12/L  13   Hemoglobin 11.9 13.3 - 17.7 g/dL L 13   Hematocrit 40.0 40.0 - 53.0 %  13   MCV 86 78 - 100 fl  13   MCH 25.6 26.5 - 33.0 pg L 13   MCHC 29.8 31.5 - 36.5 g/dL L 13   RDW 16.2 10.0 - 15.0 % H 13   Platelet Count 339 150 - 450 10e9/L  13            Testing Performed By     Lab - Abbreviation Name Director Address Valid Date Range    13 - Unknown [facility] Unknown [address] [facility] [id number] 01/15/15 0916 - Present            Encounter-Level Documents:     There are no encounter-level documents.      Order-Level Documents:     There are no order-level documents.

## 2018-01-26 NOTE — IP AVS SNAPSHOT
` `     UR 8A: [phone]                 INTERAGENCY TRANSFER FORM - NOTES (H&P, Discharge Summary, Consults, Procedures, Therapies)   2018                    Hospital Admission Date: 2018  EMILIA ALMARAZ   : 1940  Sex: Male        Patient PCP Information     Provider PCP Type    Jerrod Graves MD General         History & Physicals      H&P by Ayo Chaidez MD at 2018  6:21 AM     Author:  Ayo Chaidez MD Service:  Internal Medicine Author Type:  Resident    Filed:  2018  7:32 AM Date of Service:  2018  6:21 AM Creation Time:  2018  6:21 AM    Status:  Attested :  Ayo Chaidez MD (Resident)    Cosigner:  Denilson Edmondson MD at 2018 11:54 AM        Attestation signed by Denilson Edmondson MD at 2018 11:54 AM        Attestation:  Physician Attestation   IDenilson, saw and evaluated Emilia ADEN Emily  I have reviewed and discussed with Dr. Chaidez  I personally reviewed the vital signs, medications, labs and imaging.    76 yo M with hx of HTN, CAD, COPD,  Gout, BPH s/P TURP, S/P THOMAS admitted for  worsening and recurrent vs chronic hallucinations x 1 year     Reports doing well  Intermittently falling asleep  Hx obtained from Son in law  Patient denies any pain or discomfort.     PMH: HTN, CAD, COPD,  Gout, BPH s/P TURP, S/P THOMAS  FH, SH, Allergic hx: Reviewed    Intermittently sleepy  B/L end expiratory wheeze  S1, S2 normal  Soft, Moderately distended abdomen    Labs reviewed      Key management decisions made by me:     Hallucinations: Unkown etiology at this time. Differenetial broad  CT head on  at OSH with no acute abnormality. Recent TSH jerel  Will check Vitamin B12, RPR, Folic acid  OT consult for MOCA test  Will treat for possible UTI with Ceftriaxone. Follow culture  Neurology consult for further imaging/testing  Psychiatry consult.     Rest as outlined.       Denilson Edmondson  Date of Service (when I saw the patient): 18                                History and Physical     Deion Diaz MRN# [id number]   YOB: 1940 Age: 77 year old      Date of Admission: 1/26/2018  Primary care provider: Jerrod Graves            Assessment and Plan:[TL1.1]   78 yo M with worsening and recurrent vs chronic hallucinations x 1 year without prior psychiatric or dementia history.[TL1.2]    #[TL1.1] worsening visual and tactile hallucinations: differential is broad. Nutritional, metabolic, primary psychosis, delirium, dementia with psychosis, neurosyphilis, paraneoplastic syndrome, ingestion. UDS positive for amphetamines, which is not on med list but could be cross reactive or true positive due to ingestion. Has now had poor sleep and disrupted sleep wake cycle. TSH normal in Jan at [facility]     - Neuro consult   - consider RPR   - ct zyprexa QHS, bupropion BID   - currently not a danger to himself or others but may need 1:1[TL1.2]     Chronic medical problems    CAD: atorvastatin  HTN: atenolol, furosemide   Gout: allopurinol  S/p left hip replacement: continue clindamycin suppressive therapy  BPH s/p TURP: ct flomax         Lines and tubes: PIV  FEN: regular   DVT PPx: ambulate with assist. Falls risk and delirium risk  Code status: full  Disposition: admit to medicine for neuro consult            Yue Jc Le   Med Peds PGY4   [id number]                    Chief Complaint:[TL1.1]   Worsening hallucinations[TL1.3]     History is obtained[TL1.1] mostly from[TL1.3] patient's son in law, and chart review[TL1.1]  Patient has limited ability to participate currently[TL1.3]         History of Present Illness:   Deion Diaz is a 77 year old male with a history of[TL1.1] CAD s/p stent 2003, HTN, CKD, COPD, SANTIAGO,[TL1.3] who presents with[TL1.1] persistent (versus recurrent) and worsening auditory and tactile hallucinations over the past year.     Patient's son in law is here currently but lives in [place] so much of this history is from a dc summary.  "Admitted to [facility] 1/11-1/15/18     \"[TL1.3]He was admitted to [facility] on 1/11/2018 after presenting to [facility] ED for the evaluation of visual (sees animals, maggots, snakes, people trying to hurt him) and tactile hallucinations, which are ongoing after a UTI 1 year ago. The day PTA, he called the police 5x due to hallucinations. Follows with Psychiatry and treated with Seroquel until recently.     Patient is S/p left THOMAS 8/7/17 by Dr. Campos Gentile. 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 Continues on suppressive antibiotics, clindamycin for staph infection.  Underwent XPS laser TURP on 10/30/17 by Dr. Pierre. Prior to surgery had an indwelling saldana catheter.       Admitted to [facility] 12/6-12/14/17 for UTI, bronchitis, hallucinations, and neck pain after falls. Head/Neck CT negative for acute pathology, but showed significant degenerative changes of cervical spine. UC 12/6 grew klebsiella and pseudomonas. UC 12/10 negative. Renal US with normal kidneys, no stones. Significantly enlarged prostate and small amount of echogenic debris in the lumen of the bladder. Discharged to [facility] TCU and returned to assisted living on 12/27. Of note, infections exacerbate hallucinations.[TL1.4]\"[TL1.3]    [TL1.4]    Son in law and daughter brought him to [place] ED to be evaluated due to the increasing violence of the hallucinations and concern about his safety.[TL1.3] Wanted him to be admitted to Geriatric Psych unit per previous discussion with PCP.[TL1.2] He[TL1.3] currently[TL1.2] lives at an [facility] and is relatively independent at baseline, but in the past few weeks, he has, for example, held a  knife to his toes to clean the bugs off of them.[TL1.3] He's taken a hammer to his scooter because one of his recurrent hallucinations is a woman who one time shrunk down and hid inside the engine.[TL1.2] He has been " following with outpatient psychiatry with some changes in meds (switch from seroquel to zyprexa) with no effect. Some of the hallucinations preivously were thought to be related to or at least exacerbated by infections, yousif with the above surgeries and complications.[TL1.3] Per family, he did not have problems with confusion or hallucination or forgetfulness before about a year ago.[TL1.2]     Interestingly,[TL1.5] he is aware of his hallucinations and has said that it feels like he's living in two different worlds and is getting pulled more and more into the world with hallucinations. Per son in law, he is able to recognize family members and talk logically and accurately about politics and his past. Unclear history of head traumas/concussions in his younger years. Denies CP, dyspnea, abd pain, runny nose, fever, cough. Still able to eat and drink normally and has not lost weight.[TL1.2]                Past Medical History:     Past Medical History:   Diagnosis Date     Chronic kidney disease      Closed left hip fracture (H)      Emphysema of lung (H)              Past Surgical History:     Past Surgical History:   Procedure Laterality Date     BACK SURGERY      neck, back      CARDIAC SURGERY      stent X1              Social History:     Social History   Substance Use Topics     Smoking status: Former Smoker     Packs/day: 1.50     Years: 40.00     Quit date: 5/31/2000     Smokeless tobacco: Never Used     Alcohol use Yes      Comment: none recently             Family History:   No family history on file.  Unable to obtain due to patient mental status          Allergies:   No Known Allergies          Medications:     Med list reconciled with list from [facility]     Prescriptions Prior to Admission   Medication Sig Dispense Refill Last Dose     furosemide (LASIX) 40 MG tablet Take 1 tablet (40 mg) by mouth daily Hold for SBP<100 7 tablet 0      gabapentin (NEURONTIN) 300 MG capsule Take 1 capsule (300 mg) by mouth At  Bedtime for 7 days 7 capsule 0      acetaminophen (TYLENOL) 325 MG tablet Take 2 tablets (650 mg) by mouth 3 times daily 100 tablet       buPROPion (WELLBUTRIN XL) 150 MG 24 hr tablet Take 1 tablet (150 mg) by mouth daily for 7 days 7 tablet 0      HYDROcodone-acetaminophen (NORCO) 5-325 MG per tablet Take 1 tablet by mouth every 4 hours as needed for moderate to severe pain 21 tablet 0      NONFORMULARY Hydrocortisone 2.5% cream mixed 1:1 with Vanicream apply BID from neck down to affected area   5/31/2017 at am     Ca Carbonate-Mag Hydroxide (ROLAIDS PO) Take by mouth as needed   prn     TAMSULOSIN HCL PO Take 0.4 mg by mouth At Bedtime   5/30/2017 at hs     fluticasone-salmeterol (ADVAIR) 500-50 MCG/DOSE diskus inhaler Inhale 1 puff into the lungs every 12 hours   5/31/2017 at am     albuterol (PROAIR HFA, PROVENTIL HFA, VENTOLIN HFA) 108 (90 BASE) MCG/ACT inhaler Inhale 2 puffs into the lungs every 6 hours as needed    prn     ALLOPURINOL PO Take 100 mg by mouth daily    5/31/2017 at am     ATENOLOL PO Take 50 mg by mouth daily    5/31/2017 at am     ATORVASTATIN CALCIUM PO Take 20 mg by mouth daily         multivitamin, therapeutic with minerals (MULTI-VITAMIN) TABS Take 1 tablet by mouth daily   5/31/2017 at am     NITROGLYCERIN SL Place 0.4 mg under the tongue   prn             Review of Systems:   traci[TL1.1]bl[TL1.3]e to obtain complete 10 point ROS due to patient mental status            Physical Exam:   /60  Pulse 84  Temp 97.3  F (36.3  C) (Oral)  Resp 18  Wt 73.9 kg (163 lb)  SpO2 92%  BMI 27.12 kg/m2    GEN:[TL1.1] sleeping, arouses easily but having difficulty staying awake to talk,[TL1.3] NAD  HEAD/NECK: NCAT. Neck supple. No lymphadenopathy.    ENT: Normal conjunctivae. Oropharynx with no erythema, no exudates.    CV: RRR, no rubs/gallops/murmurs  RESP: breathing comfortably on room air, CTA bilaterally  ABD/GI: soft,[TL1.1] obese,[TL1.3] NTND. No rebound, no guarding. Normal BS  DERM: no  suspicious lesions or rashes[TL1.1] in exposed skin[TL1.3]  EXT: warm, well-perfused. No pitting edema, 2+ pedal pulses bilaterally[TL1.1]. Both shins with diffuse scabs and shallow lacerations[TL1.3]  NEURO:[TL1.1] oriented to self, situation.[TL1.3] CN2-12 intact.[TL1.1] Sensation and strength grossly intact, 4/5 strength but symmetric. MAEE[TL1.3]  PSYCH:[TL1.1] somewhat coherent and appropriate though sleepy[TL1.3]     LABS[TL1.1]  utox + for amphetamines and opiates[TL1.4]   UA with pyuria[TL1.3]     IMAGING[TL1.1]    CT head in May 2017 wnl, no masses or bleeds  Also 18 at [facility], wnl[TL1.3]       Revision History        User Key Date/Time User Provider Type Action    > TL1.2 2018  7:32 AM Ayo Chaidez MD Resident Sign     TL1.5 2018  6:49 AM Ayo Chaidez MD Resident Share     TL1.3 2018  6:47 AM Ayo Chaidez MD Resident Share     TL1.4 2018  6:32 AM Ayo Chaidez MD Resident Share     TL1.1 2018  6:21 AM Ayo Chaidez MD Resident                   Discharge Summaries     No notes of this type exist for this encounter.         Consult Notes      Consults by Sasha Dickens MSW at 2018  3:22 PM     Author:  Sasha Dickens MSW Service:  Social Work Author Type:      Filed:  2018  3:30 PM Date of Service:  2018  3:22 PM Creation Time:  2018  3:21 PM    Status:  Signed :  Sasha Dickens MSW ()         Social Work: Assessment with Discharge Plan    Patient Name:  Deion Diaz  :  1940  Age:  77 year old  MRN:  [id number]  Risk/Complexity Score:  Filed Complexity Screen Score: 6  Completed assessment with:  Pt, adult daughters (Mackenzie and Diana). Writer left voicemail message for Nuha [phone] with pt's verbal consent     Presenting Information   Reason for Referral:  Discharge plan  Date of Intake:  2018  Referral Source:  Physician  Decision Maker:  Pt with support of adult  children  Alternate Decision Maker: per policy, pt's NOK-Adult children Emilia (son), Daughter Mackenzie or Daughter Diana    Living Situation:  House  Previous Functional Status:  Independent  Patient and family understanding of hospitalization:  Seeking diagnosis/plan for pt's frequent falls and hallucination  Cultural/Language/Spiritual Considerations:    Adjustment to Illness:  Frustrated, stressful. Pt wants to DC home w/resumption of home care but medical team supports DC to TCU    Physical Health  Reason for Admission:[MK1.1]    1. Hallucinations of tactile sensation    2. Visual hallucination[MK1.2]      Services Needed/Recommended:  TCU    Mental Health/Chemical Dependency  Diagnosis:  ? Lewy Body  Support/Services in Place:  none  Services Needed/Recommended:  Further Neuro Psych testing    Support System  Significant relationship at present time:  Yes, adult children  Family of origin is available for support:  Yes  Other support available:  Pt identifies having home care involvement  Gaps in support system:  Pt may need higher level of care  Patient is caregiver to:  None     Provider Information   Primary Care Physician:  Jerrod Graves   [phone]   Clinic:  [place] [address] / [place]      :      Financial   Income Source:  Pension, social security  Financial Concerns:  None noted  Insurance:    Payor/Plan Subscriber Name Rel Member # Group #   MEDICARE - MEDICARE EMILIA ALMARAZ  [id number]       ATTN CLAIMS, [address]   COMMERCIAL - [facility] HEA* EMILIA ALMARAZ  [id number]      [address], [facility] [address]       Discharge Plan   Patient and family discharge goal:  Pt verbalizes agreement to referral to [facility], stated preference is to DC home w/resumption of home care. Pt is happy to have involvement of his adult children in DC planning  Provided education on discharge plan:  YES  Patient agreeable to discharge plan:  YES  A list of  "Medicare Certified Facilities was provided to the patient and/or family to encourage patient choice. Patient's choices for facility are:  [facility]-pt has had previous stay there  Will NH provide Skilled rehabilitation or complex medical:  YES  General information regarding anticipated insurance coverage and possible out of pocket cost was discussed. Patient and patient's family are aware patient may incur the cost of transportation to the facility, pending insurance payment: YES  Barriers to discharge:  Assessment process    Discharge Recommendations   Anticipated Disposition:  Facility:  [facility] [phone]  Transportation Needs:  Family:  adult children can provide  Name of Transportation Company and Phone:  Adult children can provide    Additional comments   Writer introduced role/reason for visit. 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 He states he had just woken up from a nap, asked if he should return to his room \"805 (he is in 803) and needed verbal cuing to reorient to place. After being verbally cued, pt looked around and said \"Oh you're right, I am in my room.\" He then shared that he did not get good sleep because the \"couple of ladies came to spend the night. I would be okay with that if they left me alone but they did not.\" Pt provided consent for writer to speak w/ his adult daughters and involve them in DC plan. He also consented to referral to [facility]. Writer left voicemail message for Diana. Writer spoke w/Reesa in Admissions at [facility] and sen assessment information via Epic. Await response. SW con't to follow[MK1.1]     Revision History        User Key Date/Time User Provider Type Action    > MK1.2 1/30/2018  3:30 PM Sasha Dickens MSW  Sign     MK1.1 1/30/2018  3:21 PM Sasha Dickens MSW              Consults by Benjamín Richard MD at 1/30/2018  1:39 PM     Author:  Benjamín Richard MD Service:  " Psychiatry Author Type:  Physician    Filed:  1/30/2018  1:52 PM Date of Service:  1/30/2018  1:39 PM Creation Time:  1/30/2018  1:39 PM    Status:  Signed :  Benjamín Richard MD (Physician)     Consult Orders:    1. Psychiatry IP Consult: follow up consult for ongoing hallucinations, insomnia.; Consultant may enter orders: Yes; Patient to be seen: Routine; Call back #: [id number] [[id number]] ordered by Jose Raul Santamaria MD at 01/30/18 0748                [facility] [place]   Psychiatric Consult  Admission date: 1/26/2018  Deion Diaz  [id number]  01/30/18  Denilson Debo    Time: 39 minutes on encounter, >50% of which was spent in counseling and/or coordination of care consisting of: communication and education with the patient, communication with family and or friends if documented in note, lab/image/study evaluation, support staff communication, and other sources pertinent to excellent patient care.             Assessment & Plan:     Diagnosis:  Lewy body dementia until proven otherwise    Assessment:  Deion is currently experiencing the most likely etiology as Lewy body dementia.  It is somewhat convoluted based on his lack of general normal gait and I cannot find parkinsonian type symptoms.  He did not have any rigidity he did not have any tremor at rest.  Unclear at this point if he has a masklike face though it could be in the initial stages.  Generally this condition starts with visual hallucinations in the older population that they are not particularly concerned about.  Generally individuals with this condition are able to identify them as hallucinations and complain about them.  We discussed the utility of ignoring them and also the use of rivastigmine and similar medications as being the primary medication of choice.  I would not suspect antipsychotic medications to be of benefit and it is not necessary to discontinue bupropion unless he does not have response  from rivastigmine.    Plan:  Recommend starting rivastigmine 4.6 mg patch and titrate up as necessary  Antipsychotic medications likely of low yield  Discontinuation of bupropion probably not necessary  No need for inpatient psychiatry or a one-to-one  Please reconsult as necessary            HPI:   Deion Diaz with a past medical history of hypertension, coronary artery disease, COPD, gout, obstructive sleep apnea, hyperlipidemia, BPH was admitted 1/26/2018 for continued hallucinations of more than a year and recent Staphylococcus infection of his hip.    According records he last had hallucinations in 2016 while on opiate medications that resolved after discontinuation.  He has also had urinary tract infection and other types of infection that lead to delirium and subsequent hallucinations.  His urinary toxicology resulted in a positive amphetamine it is likely related to bupropion.  Neurology evaluation has been negative and recommended donepezil as well as the potential for Lewy body dementia.  This was not trialed yet and he has previously tried quetiapine and olanzapine without success in the outpatient setting.  Psychiatric follow-up and evaluation have been discussing reducing bupropion and titrating up on olanzapine.    Upon meeting with him he continues to have mostly visual hallucinations of 2 females one that he describes as the sister of the other and they have children that run around at times.  They do sexual actions towards him.  He has previously had hallucinations of multiple animals possibly starting about 2 years ago to his recollection.  He does not have any depressive symptoms or previous psychiatric conditions.  He does not have any thoughts of harming himself or others or any hopelessness or helplessness.  He started the bupropion related to smoking cessation and had improvement in mood and has been taking this for years.  He has some recent weight loss but was not purposeful but he found  it helpful and he is just frustrated with his movement and recent infection.  He does not have any paranoia about staff members and is not experiencing any depressive symptoms whatsoever.  He does not seem to be anxious and would like assistance with the hallucinations he is experiencing and is able to identify them as such.           Physical ROS:   The patient endorsed the above issues. The remainder of 10-point review of systems was negative except as noted in HPI.         Current Medications:[JD1.1]       OLANZapine  7.5 mg Oral At Bedtime     buPROPion  150 mg Oral Daily     aspirin EC  81 mg Oral Daily     atenolol (TENORMIN) tablet 50 mg  50 mg Oral Daily     allopurinol (ZYLOPRIM) tablet 100 mg  100 mg Oral Daily     atorvastatin (LIPITOR) tablet 20 mg  20 mg Oral Daily at 8 pm     fluticasone-vilanterol  1 puff Inhalation Daily     furosemide  20 mg Oral Daily     multivitamin, therapeutic with minerals  1 tablet Oral Daily     tamsulosin (FLOMAX) capsule 0.4 mg  0.4 mg Oral At Bedtime     clindamycin  300 mg Oral BID     fluticasone  2 spray Both Nostrils Daily     sodium chloride (PF)  3 mL Intracatheter Q8H[JD1.2]            PTA Medications:[JD1.1]     Prescriptions Prior to Admission   Medication Sig Dispense Refill Last Dose     furosemide (LASIX) 40 MG tablet Take 1 tablet (40 mg) by mouth daily Hold for SBP<100 7 tablet 0      gabapentin (NEURONTIN) 300 MG capsule Take 1 capsule (300 mg) by mouth At Bedtime for 7 days 7 capsule 0      acetaminophen (TYLENOL) 325 MG tablet Take 2 tablets (650 mg) by mouth 3 times daily 100 tablet       buPROPion (WELLBUTRIN XL) 150 MG 24 hr tablet Take 1 tablet (150 mg) by mouth daily for 7 days 7 tablet 0      HYDROcodone-acetaminophen (NORCO) 5-325 MG per tablet Take 1 tablet by mouth every 4 hours as needed for moderate to severe pain 21 tablet 0      NONFORMULARY Hydrocortisone 2.5% cream mixed 1:1 with Vanicream apply BID from neck down to affected area    5/31/2017 at am     Ca Carbonate-Mag Hydroxide (ROLAIDS PO) Take by mouth as needed   prn     TAMSULOSIN HCL PO Take 0.4 mg by mouth At Bedtime   5/30/2017 at hs     fluticasone-salmeterol (ADVAIR) 500-50 MCG/DOSE diskus inhaler Inhale 1 puff into the lungs every 12 hours   5/31/2017 at am     albuterol (PROAIR HFA, PROVENTIL HFA, VENTOLIN HFA) 108 (90 BASE) MCG/ACT inhaler Inhale 2 puffs into the lungs every 6 hours as needed    prn     ALLOPURINOL PO Take 100 mg by mouth daily    5/31/2017 at am     ATENOLOL PO Take 50 mg by mouth daily    5/31/2017 at am     ATORVASTATIN CALCIUM PO Take 20 mg by mouth daily         multivitamin, therapeutic with minerals (MULTI-VITAMIN) TABS Take 1 tablet by mouth daily   5/31/2017 at am     NITROGLYCERIN SL Place 0.4 mg under the tongue   prn[JD1.2]          Allergies:[JD1.1]   No Known Allergies[JD1.2]       Labs:[JD1.1]     Recent Results (from the past 48 hour(s))   CBC with platelets    Collection Time: 01/29/18  7:55 AM   Result Value Ref Range    WBC 8.0 4.0 - 11.0 10e9/L    RBC Count 4.36 (L) 4.4 - 5.9 10e12/L    Hemoglobin 11.3 (L) 13.3 - 17.7 g/dL    Hematocrit 36.9 (L) 40.0 - 53.0 %    MCV 85 78 - 100 fl    MCH 25.9 (L) 26.5 - 33.0 pg    MCHC 30.6 (L) 31.5 - 36.5 g/dL    RDW 16.3 (H) 10.0 - 15.0 %    Platelet Count 344 150 - 450 10e9/L   Basic metabolic panel    Collection Time: 01/29/18  7:55 AM   Result Value Ref Range    Sodium 145 (H) 133 - 144 mmol/L    Potassium 3.8 3.4 - 5.3 mmol/L    Chloride 111 (H) 94 - 109 mmol/L    Carbon Dioxide 27 20 - 32 mmol/L    Anion Gap 7 3 - 14 mmol/L    Glucose 86 70 - 99 mg/dL    Urea Nitrogen 12 7 - 30 mg/dL    Creatinine 0.90 0.66 - 1.25 mg/dL    GFR Estimate 82 >60 mL/min/1.7m2    GFR Estimate If Black >90 >60 mL/min/1.7m2    Calcium 8.6 8.5 - 10.1 mg/dL   Basic metabolic panel    Collection Time: 01/30/18  5:08 AM   Result Value Ref Range    Sodium 144 133 - 144 mmol/L    Potassium 3.7 3.4 - 5.3 mmol/L    Chloride 110 (H) 94 -  109 mmol/L    Carbon Dioxide 31 20 - 32 mmol/L    Anion Gap 3 3 - 14 mmol/L    Glucose 89 70 - 99 mg/dL    Urea Nitrogen 10 7 - 30 mg/dL    Creatinine 0.93 0.66 - 1.25 mg/dL    GFR Estimate 78 >60 mL/min/1.7m2    GFR Estimate If Black >90 >60 mL/min/1.7m2    Calcium 8.5 8.5 - 10.1 mg/dL[JD1.2]          Physical and Psychiatric Examination:[JD1.1]     /65  Pulse 91  Temp 96.1  F (35.6  C) (Oral)  Resp 16  Wt 76.3 kg (168 lb 4.8 oz)  SpO2 92%  BMI 28.01 kg/m2[JD1.2]  Weight is[JD1.1] 168 lbs 4.8 oz  Body mass index is 28.01 kg/(m^2).[JD1.2]                                           Weight over time:[JD1.1]  Vitals:    01/26/18 1716 01/27/18 1700 01/28/18 1100   Weight: 73.9 kg (163 lb) 77 kg (169 lb 12.1 oz) 76.3 kg (168 lb 4.8 oz)[JD1.2]       Mental Status Exam:  Deion is a 77-year-old male wearing a white shirt and is overweight.  His speech is of an appropriate rate and tone and his language is intact.  Behavior is appropriate and he does not have any abnormal movements.  His affect is neutral and he smiles at times.  His mood he describes as all right.  His thought content consists of the above without thoughts of harming himself or others or current delusions.  His thought process is goal without looseness of association.  He does endorse abnormal perceptions.  He is alert and aware of his current location and circumstances.  His attention concentration appears adequate.  His cognition and fund of knowledge appears normal.  His long-term/short-term/remote memory appears intact.  His insight and judgment are both good.    Benjamín Richard  [facility]      The following document has been created with voice recognition software and may contain unintentional word substitutions.[JD1.1]       Revision History        User Key Date/Time User Provider Type Action    > JD1.2 1/30/2018  1:52 PM Benjamín Rcihard MD Physician Sign     JD1.1 1/30/2018  1:39 PM Jose Manuel  Benjamín Russell MD Physician             Consults by Freedom Mcneil MD at 1/29/2018  1:05 PM     Author:  Freedom Mcneil MD Service:  Psychiatry Author Type:  Physician    Filed:  1/29/2018  1:22 PM Date of Service:  1/29/2018  1:05 PM Creation Time:  1/29/2018  1:04 PM    Status:  Signed :  Freedom Mcneil MD (Physician)         [facility], [place]   Psychiatric Consult Follow-up  Hospital Day:[TP1.1] 2[TP1.2]        Interim History:   Psychiatry was asked to follow-up with the patient today. I reviewed the medical record of this admission and met with the patient.    Patient reports that he feels the same since his arrival to the hospital.  He was able to describe in detail his various hallucinations.  Indicates that his hallucinations are visual about 99% of the time.  He occasionally will have tactile and auditory hallucinations.  He claims that he tries to talk to his visual hallucinations but they usually do not act indicates that he is aware that his hallucinations are not indeed not real however he still is obligated to help him in some way.  He has a somewhat illogical comparison to the need to treat all people the same.  During the 2 he indicates that 1 of his hallucinations started biting his toes; at that point he indicates that he believes this is some sort of foreplay and that he will often tell the hallucinations that he is 79 or 24 they should go find someone else.  Otherwise during the interview the patient is oriented. He is a very strong recollection of past events.  He will make corrections to the smallest details when I ask him questions about information I have read in the chart.    Asked patient about his recent vision.  He indicates that when the hallucinations first started he went to see an ophthalmologist discovered he had floaters in his left eye and needed cataract surgery in his right.  Patient also indicates that the hallucinations are  worse at night however they are present all the time.  He states that last neither particularly bad as they continued to bite at his feet and pull at his joints.    He denies any side effects related to his olanzapine 5 mg.  He would be agreeable to increasing this.         Medications:[TP1.1]       buPROPion  150 mg Oral Daily     aspirin EC  81 mg Oral Daily     atenolol (TENORMIN) tablet 50 mg  50 mg Oral Daily     allopurinol (ZYLOPRIM) tablet 100 mg  100 mg Oral Daily     atorvastatin (LIPITOR) tablet 20 mg  20 mg Oral Daily at 8 pm     fluticasone-vilanterol  1 puff Inhalation Daily     furosemide  20 mg Oral Daily     multivitamin, therapeutic with minerals  1 tablet Oral Daily     tamsulosin (FLOMAX) capsule 0.4 mg  0.4 mg Oral At Bedtime     clindamycin  300 mg Oral BID     fluticasone  2 spray Both Nostrils Daily     OLANZapine  5 mg Oral At Bedtime     sodium chloride (PF)  3 mL Intracatheter Q8H[TP1.2]          Allergies:[TP1.1]   No Known Allergies[TP1.2]       Labs:[TP1.1]     Recent Results (from the past 24 hour(s))   CBC with platelets    Collection Time: 01/29/18  7:55 AM   Result Value Ref Range    WBC 8.0 4.0 - 11.0 10e9/L    RBC Count 4.36 (L) 4.4 - 5.9 10e12/L    Hemoglobin 11.3 (L) 13.3 - 17.7 g/dL    Hematocrit 36.9 (L) 40.0 - 53.0 %    MCV 85 78 - 100 fl    MCH 25.9 (L) 26.5 - 33.0 pg    MCHC 30.6 (L) 31.5 - 36.5 g/dL    RDW 16.3 (H) 10.0 - 15.0 %    Platelet Count 344 150 - 450 10e9/L   Basic metabolic panel    Collection Time: 01/29/18  7:55 AM   Result Value Ref Range    Sodium 145 (H) 133 - 144 mmol/L    Potassium 3.8 3.4 - 5.3 mmol/L    Chloride 111 (H) 94 - 109 mmol/L    Carbon Dioxide 27 20 - 32 mmol/L    Anion Gap 7 3 - 14 mmol/L    Glucose 86 70 - 99 mg/dL    Urea Nitrogen 12 7 - 30 mg/dL    Creatinine 0.90 0.66 - 1.25 mg/dL    GFR Estimate 82 >60 mL/min/1.7m2    GFR Estimate If Black >90 >60 mL/min/1.7m2    Calcium 8.6 8.5 - 10.1 mg/dL[TP1.2]          Psychiatric  Examination:[TP1.1]     /51 (BP Location: Left arm)  Pulse 91  Temp 95.8  F (35.4  C) (Oral)  Resp 18  Wt 76.3 kg (168 lb 4.8 oz)  SpO2 94%  BMI 28.01 kg/m2[TP1.2]  Weight is[TP1.1] 168 lbs 4.8 oz  Body mass index is 28.01 kg/(m^2).[TP1.2]    Appearance: awake, alert, adequately groomed and casually dressed  Attitude:  cooperative  Eye Contact:  good  Mood:  good  Affect:  appropriate and in normal range and mood congruent  Speech:  clear, coherent and normal prosody  Language: fluent and intact in English  Psychomotor, Gait, Musculoskeletal:  no evidence of tardive dyskinesia, dystonia, or tics  Throught Process:  some illogical reasoning for responding to his hallucinations  Associations:  no loose associations  Thought Content:  Visual hallucinations to which he has a surprising amount of insight  Insight:  good  Judgement:  need to respond to his hallucinations despite knowing they are not real displays poor judgement  Oriented to:  time, person, and place  Attention Span and Concentration:  intact  Recent and Remote Memory:  intact  Fund of Knowledge:  appropriate         Precautions:[TP1.1]     Behavioral Orders   Procedures     Fall precautions[TP1.2]          Diagnoses:      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 Luis Doyle.  At that time Dr. Doyle had made recommendations to trial olanzapine and tapered bupropion.  At this point in time bupropion is in a taper as recommended by pharmacy.  I do not see any significant change in the patient's behavior.  I agree with Dr. Doyle that the presentation is most consistent with an organic cause to his hallucinations.  As indicated by neurology and MRI will be helpful in ascertaining the exact nature of the possible organic cause.  Differential is broad and further studies will need to be obtained.  Most likely the lab  result was a false positive for amphetamines due to his bupropion usage.    Recommendations:  1.  Primary team may consider increasing olanzapine as tolerated by patient consider 7.5 mg nightly.  Given his fall risk however this will need to be balanced for possible orthostasis.  2.  Continue with bupropion taper is recommended initially by Dr. Doyle.  3.  Continue with neurology recommendations.  4.  At this time there is no indication that this is a primary psychiatric disorder that would indicate need for treatment on the psychiatric unit.    Please contact psychiatry with any further questions related to this case.[TP1.1]          Revision History        User Key Date/Time User Provider Type Action    > TP1.2 1/29/2018  1:22 PM Freedom Mcneil MD Physician Sign     TP1.1 1/29/2018  1:04 PM Freedom Mcneil MD Physician             Consults by Freedom Mcneil MD at 1/29/2018  9:30 AM     Author:  Freedom Mcneil MD Service:  Psychiatry Author Type:  Physician    Filed:  1/29/2018  9:52 AM Date of Service:  1/29/2018  9:30 AM Creation Time:  1/29/2018  9:52 AM    Status:  Signed :  Freedom Mcneil MD (Physician)     Consult Orders:    1. Psychiatry IP Consult: Follow up visit; Consultant may enter orders: Yes; Patient to be seen: Routine; Call back #: [phone] [[id number]] ordered by Denilson Edmondson MD at 01/29/18 0745                Follow-up consult completed. Dictation to follow.[TP1.1]     Revision History        User Key Date/Time User Provider Type Action    > TP1.1 1/29/2018  9:52 AM Freedom Mcneil MD Physician Sign            Consults signed by Luis Doyle MD at 1/28/2018  6:38 AM      Author:  Luis Doyle MD Service:  Psychiatry Author Type:  Physician    Filed:  1/28/2018  6:38 AM Date of Service:  1/27/2018  9:13 PM Creation Time:  1/28/2018  1:20 AM    Status:  Signed :  Luis Doyle MD (Physician)         Consult Date:  01/27/2018      DATE OF SERVICE:  01/27/2018        REASON FOR CONSULTATION: The Medicine Service requested a consult to evaluate this patient for hallucinations.      HISTORY OF PRESENT ILLNESS:  The patient is a 77-year-old gentleman with a history of coronary artery disease, a distant history of alcohol dependence, hypertension, chronic kidney disease, COPD, obstructive sleep apnea who presents with persistent visual hallucinations over the past approximately a year.  The patient was admitted to [facility] on 01/11/2018 for the evaluation of hallucinations.  The day prior to coming in, he had called the police 5 times due to hallucinations.  He is followed by Psychiatry and has been on Seroquel and Zyprexa apparently without much effect on the hallucinations.  The patient's son-in-law and daughter brought him to the emergency room to be evaluated due to the increase in his  hallucinations and concern for his safety.  Initially there were thoughts of wanting to admit him to a geriatric psyche.  He currently lives alone, he is relatively independent.  He has done some things such as hold a  knife to his toes to clean the bugs off them; take a hammer to his scooter because one of his hallucinations is of a woman that has shrunk down inside the engine.  The  patient has had a number of medical issues, surgery and hospitalizations over the past year.      The patient was seen with his daughter.  He went on to describe hallucinations of animals and bugs.  There were descriptions then of people.  He has stated at one time he saw people on stilts.  He had the vision of a young woman nibbling on his toes.  Other hallucinatory experiences were the people that live at his extended care facility.  He went on to describe these but then began to express some delusional material, feeling that there was some type of cult involving the people in the extended care facility, at one point described ceremonies they have where they de-flower a  "young woman.  He stated that \"they like toes\".  He did appear to have difficulty with reality testing around this.  He felt that there certainly was a possibility of there being a cult with these various people from his extended care facility being involved in this.  Other hallucinatory experiences such as bugs or animals, he seemed less convinced that they were real, although by his behavior with a knife in his agitation as described, he certainly seems to have difficulty sorting out these hallucinatory experiences.      The patient denies depression, denies marked anxiety or being upset, except around these hallucinatory experiences.      PAST PSYCHIATRIC HISTORY:  The patient has no previous psychiatric history.      FAMILY PSYCHIATRIC HISTORY:  Negative.      CHEMICAL DEPENDENCY HISTORY:  The patient states that he drinks 1-2 drinks a day but has not drank in 12 days.  Per his daughter, he has a history of heavy alcohol use and she would describe him as an alcoholic, although he has cut down.  He also was a smoker and stopped smoking 7 to 8 years ago but stayed on Wellbutrin for unclear reasons after that time and currently is on 150 b.i.d.      PAST MEDICAL HISTORY:  Significant for chronic kidney disease, closed hip fracture, emphysema, coronary artery disease, obstructive sleep apnea.      SOCIAL HISTORY:  The patient is a former smoker of 1-1/2 packs a day for 40 years, quit in 2000, per report.  Does use alcohol as above.  He currently lives in an assisted care facility.      MEDICATIONS:  On admission were reviewed and include, gabapentin, Lasix, bupropion 150 mg b.i.d., hydrocodone, Advair, Ventolin, atorvastatin, allopurinol.      REVIEW OF SYSTEMS:  A 10-point review of systems was performed.  Denies chest pain, shortness of breath, GI symptoms, fevers, chills, nausea, vomiting or other localizing neurological symptoms.      VITAL SIGNS:  Blood pressure 126/60, pulse 84, temperature 97.3, respirations " 18.      MENTAL STATUS EXAMINATION:     GENERAL APPEARANCE AND BEHAVIOR:  The patient is an elderly gentleman sitting up eating his lunch.  He was generally cooperative with the exam but sometimes seemed to lose his interest in the process and would disengage.   MOOD AND AFFECT:  The patient denies depression.  Affect is mobile.   THOUGHT PROCESSES AND ASSOCIATIONS: Were within normal limits.   THOUGHT CONTENT:  Denies auditory or visual hallucinations at this time.  Denies suicidal ideation.   SPEECH AND LANGUAGE:  Within normal limits.     ATTENTION AND CONCENTRATION:  Appear generally intact.   ORIENTATION:  The patient is alert and oriented x3.     RECENT AND REMOTE MEMORY:  Appear generally intact, although he has had some difficulties with details at times.     FUND OF KNOWLEDGE:  Appears average.   JUDGMENT AND INSIGHT:  Appears adequate but limited in some ways in that he believes there are cults and things of this sort, which sound delusional.     MUSCLE BULK AND TONE:  Appears normal.     ABNORMAL MOVEMENTS: Not noted.      IMAGING:  CT scan from 05/17 was reviewed which was read as normal.        LABORATORY: Was reviewed.  Of note, his urine was positive for opiates and amphetamines.      IMPRESSION:  The patient is a 77-year-old gentleman with no previous psychiatric history who has an approximately 1-year history of visual hallucinations and some underlying delusional beliefs about these hallucinations.  He scored a 27/30 on the Mini-Mental Status Exam per the Neurology Service.  He did appear to be a somewhat vague historian at times to me and to lack attention to the process at times.  It is unclear what the etiology of this is.  Obviously there is concern here about organic hallucinosis and organic psychosis as well as underlying neurocognitive disorder such as Lewy body disease.  Also, the patient has a long history of drinking and coronary artery disease.  It is unclear whether he has any  microvascular disease that may be impacting his cognitive and psychiatric functioning.  Also he has been on Wellbutrin, which is a stimulant type drug and can also cause positive amphetamine screens.  He has been on antipsychotics, which seem to have been ineffective.      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      TREATMENT RECOMMENDATIONS:   1.  Would have the Occupational Therapy service come by and do a MOCA exam to check for frontal lobe functioning.  He does have a good MMSE, but if he does have more of a Lewy body presentation or some other underlying neurocognitive disorder, there may be frontal lobe systems involved.   2.  Agree with MRI as per Neurology.   3.  would consider further trials of atypical antipsychotics. He has been on Seroquel and Zyprexa in the past, although I do not know the dose.  It may be that he was insufficiently dosed. WOuld clarify this.  Consider trials of other agents.  4. Would taper and discontinue Wellbutrin.  Indications are unclear and can exacerbate psychotic symptoms.     5.  On the basis of MOCA, consider a Neuropsychology evaluation.     6.  The above was discussed with the patient's daughter and patient.   7.  Please call or reconsult with any questions, concerns or change in the patient's status.  My number is [phone].         ZULEYKA DOYLE MD             D: 2018   T: 2018   MT: MD      Name:     EMILIA ALMARAZ   MRN:      [id number]        Account:       [id number]   :      1940           Consult Date:  2018      Document: [id number][RW1.1]         Revision History        User Key Date/Time User Provider Type Action    > RW1.1 2018  6:38 AM Zuleyka Doyle MD Physician Sign     [N/A] 2018  1:20 AM Zuleyka Doyle MD Physician Edit            Consults by Zuleyka Doyle MD at 2018  9:13 PM     Author:  Zuleyka Doyle MD  Service:  Psychiatry Author Type:  Physician    Filed:  1/27/2018  9:13 PM Date of Service:  1/27/2018  9:13 PM Creation Time:  1/27/2018  9:13 PM    Status:  Signed :  Luis Doyle MD (Physician)     Consult Orders:    1. Psychiatry IP Consult: Hallucinations; Consultant may enter orders: Yes; Patient to be seen: Routine; Call back #: [phone] [[id number]] ordered by Denilson Edmondson MD at 01/27/18 0814                Initial  Dictated[RW1.1]     Revision History        User Key Date/Time User Provider Type Action    > RW1.1 1/27/2018  9:13 PM Luis Doyle MD Physician Sign            Consults by Chung Beal MD at 1/27/2018 10:59 AM     Author:  Chung Beal MD Service:  Neurology Author Type:  Physician    Filed:  1/27/2018  8:14 PM Date of Service:  1/27/2018 10:59 AM Creation Time:  1/27/2018 10:57 AM    Status:  Signed :  Chung Beal MD (Physician)     Consult Orders:    1. Neurology General Adult IP Consult: Patient to be seen: Routine within 24 hrs; Call back #: [email or web address] [email or web address]; worsening hallucinations eval for dementia with psychosis; Consultant may enter orders: Yes [[id number]] ordered by Ayo Chaidez MD at 01/27/18 0538                Consult requested regarding whether[KV1.1] visual[KV1.2] hallucinations could be secondary to dementia    Chief Complaint:[KV1.1] Daily visual hallucinations[KV1.2]    History of Present Illness:  Mr. Diaz is a 77 year old[KV1.1] left[KV1.2]-handed male presenting for evaluation of[KV1.1] visual hallucinations since Spring 2017[KV1.2]. H[KV1.1]e is a good historian and his daughter Mackenzie is also present to give additional details.    Mr. Diaz was in his usual state of health, having retired 20 yrs ago, and living alone with minimal care until the spring 2017 at which time he developed visual hallucinations in the setting of a UTI. Since that time the hallucinations have continued during and between intercurrent illnesses,  which have included left hip fracture requiring oxycodone and treatment of staph infection,[KV1.2] AND[KV1.3] UTI treated with Bactrim[KV1.2].[KV1.3]     The main hallucination is of a young woman in her 20s, professionally dressed, like an , who visits him day or night. She is sometimes accompanied by children or tall adults. He says the woman wants to be in a relationship with him. Last night she crawled under his bed and reached through the bed to grab him.  He has illusions of squirrels or bears when reading or watching TV.[KV1.2]  In addition he has seen bugs, and had a knife taken away because he was trying to cut his toe to get the bugs out.[KV1.3] The hallucinations become more threatening and violent at night and he has called the police about them several times. They diminish when he goes to common areas with other people around. They are also exacerbated by sleep deprivation and infections. Treatment with Seroquel and more recently Zyprexa has not improved the hallucinations.     Initial workup included seeing an ophthalmologist who diagnosed him with floaters in the left eye and a cataract that needs to be removed in the right eye. Of note, he reportedly does not have macular degeneration and the ophthalmologist does not think he is experiencing Malcom-Bonnet syndrome.     Regarding other cognitive symptoms, he has not experienced any memory loss, visuospatial changes outside of hallucinations, or language changes. He remembers newly learned information such as dances and plays of his grandchildren. He recognizes faces of family members. Executive function is difficult to froy because he spends much of his days watching TV.     His motor function has declined as he recovers from a left hip replacement, but he has had no changes in facial expression, posture, or gait. He has symptoms of restless legs, but no witness dream enactment or limb movement in sleep, although sleeps alone. He has some  reduction in fine motor skills. He no longer drives.[KV1.2] He fell back on his head a few weeks ago with no concussive symptoms or LOC. Head CT at that time was reportedly negative.[KV1.3]     Medical review of systems:[KV1.1] He lost about 40 lbs after hip surgery. Has good appetite.[KV1.2] The comprehensive[KV1.1] 10-point r[KV1.3]eview of systems is otherwise reviewed and negative.[KV1.1]     Patient Active Problem List   Diagnosis     Failure to thrive in adult     Hallucination, drug-induced (H)     Hallucinations     Past Medical History:   Diagnosis Date     Chronic kidney disease      Closed left hip fracture (H)      Emphysema of lung (H)[KV1.4]    CAD  Chronic cough  Retention of urine  Cystitis  Essential hypertension[KV1.2]    Past Surgical History:   Procedure Laterality Date     BACK SURGERY      neck, back      CARDIAC SURGERY      stent X1[KV1.4]      Medications[KV1.2]  Current Facility-Administered Medications   Medication     acetaminophen (TYLENOL) tablet 650 mg     albuterol (PROAIR HFA/PROVENTIL HFA/VENTOLIN HFA) Inhaler 2 puff     atenolol (TENORMIN) tablet 50 mg     allopurinol (ZYLOPRIM) tablet 100 mg     atorvastatin (LIPITOR) tablet 20 mg     buPROPion (WELLBUTRIN SR) 12 hr tablet 150 mg     fluticasone-vilanterol (BREO ELLIPTA) 200-25 MCG/INH oral inhaler 1 puff     furosemide (LASIX) tablet 20 mg     multivitamin, therapeutic with minerals (THERA-VIT-M) tablet 1 tablet     tamsulosin (FLOMAX) capsule 0.4 mg     clindamycin (CLEOCIN) capsule 300 mg     fluticasone (FLONASE) 50 MCG/ACT spray 2 spray     OLANZapine (zyPREXA) tablet 5 mg     naloxone (NARCAN) injection 0.1-0.4 mg     melatonin tablet 1 mg     lidocaine 1 % 1 mL     lidocaine (LMX4) kit     sodium chloride (PF) 0.9% PF flush 3 mL     sodium chloride (PF) 0.9% PF flush 3 mL     Contraindications to both pharmacological and mechanical prophylaxis (must document contraindications for both in this order)     senna-docusate  (SENOKOT-S;PERICOLACE) 8.6-50 MG per tablet 1 tablet    Or     senna-docusate (SENOKOT-S;PERICOLACE) 8.6-50 MG per tablet 2 tablet     polyethylene glycol (MIRALAX/GLYCOLAX) Packet 17 g     cefTRIAXone (ROCEPHIN) 1 g in 10 mL SWFI Premix Syringe     ipratropium - albuterol 0.5 mg/2.5 mg/3 mL (DUONEB) neb solution 3 mL     potassium chloride SA (K-DUR/KLOR-CON M) CR tablet 20-40 mEq     potassium chloride (KLOR-CON) Packet 20-40 mEq     potassium chloride 10 mEq in 100 mL sterile water intermittent infusion (premix)     potassium chloride 10 mEq in 100 mL intermittent infusion with 10 mg lidocaine     potassium chloride 20 mEq in 50 mL intermittent infusion     hypromellose-dextran (ARTIFICAL TEARS) ophthalmic solution 1 drop[KV1.5]          Allergies:[KV1.2] No Known Allergies[KV1.4]    Family History:   Both parents  at a young age, when the patient was a teenager. Father had cancer and committed suicide, mother  in car accident. Is middle child of 10. Three sisters  with Alzheimer's disease diagnosed in older age. Has two daughters.[KV1.2]     Social History     Social History     Marital status:      Spouse name: N/A     Number of children: N/A     Years of education: N/A     Occupational History     Not on file.     Social History Main Topics     Smoking status: Former Smoker     Packs/day: 1.50     Years: 40.00     Quit date: 2000     Smokeless tobacco: Never Used     Alcohol use Yes      Comment: none recently     Drug use: No     Sexual activity: Not on file     Other Topics Concern     Not on file     Social History Narrative[KV1.4]   High school education, worked as , retired 20 years ago. No history of LSD use. Denies recreational drugs.[KV1.2]    General Physical examination:[KV1.1]  BP 91/40 (BP Location: Right arm)  Pulse 74  Temp 97.4  F (36.3  C) (Axillary)  Resp 16  Wt 73.9 kg (163 lb)  SpO2 94%  BMI 27.12 kg/m2[KV1.4]     General: Mr. Diaz is[KV1.1]  mildly overweight,[KV1.2] and is appropriately groomed and dressed.  Chest: Clear to auscultation bilaterally.  Heart: Regular rhythm with no murmurs, rubs, or gallops.  Ext: warm, no edema  Skin: clean[KV1.1], several abrasions on legs with bandages, and dusky appearance of calves.[KV1.3]    Mental status: Mr. Diaz  is awake, alert, and appropriate, with fluent speech, no word finding difficulties and no difficulty in answering questions. He is well oriented to time, place and person. His memory for remote events is intact. His memory for recent events is normal. Attention and concentration are intact. His fund of knowledge is fair. No apraxia is noted.[KV1.1]  On MMSE he scored 27/30 with points deducted for orientation (-1 for floor), recall (-1), and copying intersecting polygons (-1)[KV1.3]  Cranial nerves: Pupils are equal, round and reactive to light bilaterally. Visual fields are full to confrontation with no visual extinction. Extraocular movements are intact[KV1.1] except for slightly impaired upgaze[KV1.3]. Smooth pursuit is intact. Saccades are normal in initiation, velocity and amplitude both vertically and horizontally. Facial sensation is intact to light touch. Facial strength is full bilaterally. Hearing is intact to finger rub bilaterally. The tongue protrudes in the midline with intact strength. There are no tongue fasciculations noted. The soft palate elevates symmetrically. Sternocledomastoid and trapezius muscle strength is full bilaterally.  Motor examination: Bulk is normal throughout. Axial and upper and lower extremity tone is normal. No pronator drift is noted. Strength is 5/5 and symmetric throughout. No fasciculations are noted. There is no tremor or other involuntary movement.  Sensory examination: Sensation is[KV1.1] diminished to cold and vibration in the left lower leg and[KV1.3] intact to proprioception.   Reflexes: Reflexes[KV1.1] are[KV1.3] 2+ at biceps, triceps, and  brachioradialis. Patellar reflexes are 2+[KV1.1] on left and 1+ on right[KV1.3]. Achilles reflexes are[KV1.1] 2+ on left and 1+ on right[KV1.3]. Plantar response is flexor bilaterally.  Coordination: Finger-nose-finger[KV1.1] i[KV1.3]s normal with no dysmetria bilaterally.[KV1.1]  Slightly slowed finger tapping and ope[KV1.3]ariana and closing of fist[KV1.1] bilaterally. Full movements when pantomiming screwing in light bulb above hand.[KV1.3] Foot tapping is not slowed.  Gait: He has[KV1.1] a mildly stooped posture[KV1.3] and steady stance[KV1.1], using walker,[KV1.3] with normal stride length and arm swing.     Labs:  Admission on 01/26/2018   Component Date Value Ref Range Status     Amphetamine Qual Urine 01/27/2018 Positive* NEG^Negative Final     Barbiturates Qual Urine 01/27/2018 Negative  NEG^Negative Final     Benzodiazepine Qual Urine 01/27/2018 Negative  NEG^Negative Final     Cannabinoids Qual Urine 01/27/2018 Negative  NEG^Negative Final     Cocaine Qual Urine 01/27/2018 Negative  NEG^Negative Final     Ethanol Qual Urine 01/27/2018 Negative  NEG^Negative Final     Opiates Qualitative Urine 01/27/2018 Positive* NEG^Negative Final     Sodium 01/27/2018 141  133 - 144 mmol/L Final     Potassium 01/27/2018 3.3* 3.4 - 5.3 mmol/L Final     Chloride 01/27/2018 103  94 - 109 mmol/L Final     Carbon Dioxide 01/27/2018 29  20 - 32 mmol/L Final     Anion Gap 01/27/2018 9  3 - 14 mmol/L Final     Glucose 01/27/2018 100* 70 - 99 mg/dL Final     Urea Nitrogen 01/27/2018 23  7 - 30 mg/dL Final     Creatinine 01/27/2018 1.11  0.66 - 1.25 mg/dL Final     GFR Estimate 01/27/2018 64  >60 mL/min/1.7m2 Final     GFR Estimate If Black 01/27/2018 77  >60 mL/min/1.7m2 Final     Calcium 01/27/2018 9.1  8.5 - 10.1 mg/dL Final     Bilirubin Total 01/27/2018 0.4  0.2 - 1.3 mg/dL Final     Albumin 01/27/2018 2.7* 3.4 - 5.0 g/dL Final     Protein Total 01/27/2018 7.5  6.8 - 8.8 g/dL Final     Alkaline Phosphatase 01/27/2018 171* 40 -  150 U/L Final     ALT 01/27/2018 27  0 - 70 U/L Final     AST 01/27/2018 33  0 - 45 U/L Final     WBC 01/27/2018 13.4* 4.0 - 11.0 10e9/L Final     RBC Count 01/27/2018 4.31* 4.4 - 5.9 10e12/L Final     Hemoglobin 01/27/2018 11.4* 13.3 - 17.7 g/dL Final     Hematocrit 01/27/2018 36.6* 40.0 - 53.0 % Final     MCV 01/27/2018 85  78 - 100 fl Final     MCH 01/27/2018 26.5  26.5 - 33.0 pg Final     MCHC 01/27/2018 31.1* 31.5 - 36.5 g/dL Final     RDW 01/27/2018 16.3* 10.0 - 15.0 % Final     Platelet Count 01/27/2018 332  150 - 450 10e9/L Final     Diff Method 01/27/2018 Automated Method   Final     % Neutrophils 01/27/2018 73.7  % Final     % Lymphocytes 01/27/2018 11.8  % Final     % Monocytes 01/27/2018 11.5  % Final     % Eosinophils 01/27/2018 2.6  % Final     % Basophils 01/27/2018 0.2  % Final     % Immature Granulocytes 01/27/2018 0.2  % Final     Nucleated RBCs 01/27/2018 0  0 /100 Final     Absolute Neutrophil 01/27/2018 9.9* 1.6 - 8.3 10e9/L Final     Absolute Lymphocytes 01/27/2018 1.6  0.8 - 5.3 10e9/L Final     Absolute Monocytes 01/27/2018 1.6* 0.0 - 1.3 10e9/L Final     Absolute Eosinophils 01/27/2018 0.4  0.0 - 0.7 10e9/L Final     Absolute Basophils 01/27/2018 0.0  0.0 - 0.2 10e9/L Final     Abs Immature Granulocytes 01/27/2018 0.0  0 - 0.4 10e9/L Final     Absolute Nucleated RBC 01/27/2018 0.0   Final     TSH 01/27/2018 0.89  0.40 - 4.00 mU/L Final     Color Urine 01/27/2018 Yellow   Final     Appearance Urine 01/27/2018 Slightly Cloudy   Final     Glucose Urine 01/27/2018 Negative  NEG^Negative mg/dL Final     Bilirubin Urine 01/27/2018 Negative  NEG^Negative Final     Ketones Urine 01/27/2018 Negative  NEG^Negative mg/dL Final     Specific Gravity Urine 01/27/2018 1.016  1.003 - 1.035 Final     Blood Urine 01/27/2018 Small* NEG^Negative Final     pH Urine 01/27/2018 5.5  5.0 - 7.0 pH Final     Protein Albumin Urine 01/27/2018 30* NEG^Negative mg/dL Final     Urobilinogen mg/dL 01/27/2018 Normal  0.0 -  2.0 mg/dL Final     Nitrite Urine 01/27/2018 Negative  NEG^Negative Final     Leukocyte Esterase Urine 01/27/2018 Large* NEG^Negative Final     Source 01/27/2018 Midstream Urine   Final     RBC Urine 01/27/2018 12* 0 - 2 /HPF Final     WBC Urine 01/27/2018 >182* 0 - 2 /HPF Final     Bacteria Urine 01/27/2018 Few* NEG^Negative /HPF Final     Squamous Epithelial /HPF Urine 01/27/2018 1  0 - 1 /HPF Final     Specimen Description 01/27/2018 Midstream Urine   Final     Special Requests 01/27/2018 Specimen received in preservative   Preliminary     Culture Micro 01/27/2018 PENDING   Preliminary   Admission on 05/31/2017, Discharged on 06/03/2017   Component Date Value Ref Range Status     WBC 05/31/2017 10.5  4.0 - 11.0 10e9/L Final     RBC Count 05/31/2017 4.75  4.4 - 5.9 10e12/L Final     Hemoglobin 05/31/2017 14.4  13.3 - 17.7 g/dL Final     Hematocrit 05/31/2017 43.2  40.0 - 53.0 % Final     MCV 05/31/2017 91  78 - 100 fl Final     MCH 05/31/2017 30.3  26.5 - 33.0 pg Final     MCHC 05/31/2017 33.3  31.5 - 36.5 g/dL Final     RDW 05/31/2017 13.3  10.0 - 15.0 % Final     Platelet Count 05/31/2017 327  150 - 450 10e9/L Final     Diff Method 05/31/2017 Automated Method   Final     % Neutrophils 05/31/2017 76.2  % Final     % Lymphocytes 05/31/2017 11.1  % Final     % Monocytes 05/31/2017 11.2  % Final     % Eosinophils 05/31/2017 1.0  % Final     % Basophils 05/31/2017 0.2  % Final     % Immature Granulocytes 05/31/2017 0.3  % Final     Nucleated RBCs 05/31/2017 0  0 /100 Final     Absolute Neutrophil 05/31/2017 8.0  1.6 - 8.3 10e9/L Final     Absolute Lymphocytes 05/31/2017 1.2  0.8 - 5.3 10e9/L Final     Absolute Monocytes 05/31/2017 1.2  0.0 - 1.3 10e9/L Final     Absolute Eosinophils 05/31/2017 0.1  0.0 - 0.7 10e9/L Final     Absolute Basophils 05/31/2017 0.0  0.0 - 0.2 10e9/L Final     Abs Immature Granulocytes 05/31/2017 0.0  0 - 0.4 10e9/L Final     Absolute Nucleated RBC 05/31/2017 0.0   Final     Sodium  05/31/2017 137  133 - 144 mmol/L Final     Potassium 05/31/2017 4.0  3.4 - 5.3 mmol/L Final     Chloride 05/31/2017 102  94 - 109 mmol/L Final     Carbon Dioxide 05/31/2017 27  20 - 32 mmol/L Final     Anion Gap 05/31/2017 8  3 - 14 mmol/L Final     Glucose 05/31/2017 86  70 - 99 mg/dL Final     Urea Nitrogen 05/31/2017 21  7 - 30 mg/dL Final     Creatinine 05/31/2017 1.33* 0.66 - 1.25 mg/dL Final     GFR Estimate 05/31/2017 52* >60 mL/min/1.7m2 Final     GFR Estimate If Black 05/31/2017 63  >60 mL/min/1.7m2 Final     Calcium 05/31/2017 9.7  8.5 - 10.1 mg/dL Final     Color Urine 05/31/2017 Yellow   Final     Appearance Urine 05/31/2017 Clear   Final     Glucose Urine 05/31/2017 Negative  NEG mg/dL Final     Bilirubin Urine 05/31/2017 Negative  NEG Final     Ketones Urine 05/31/2017 15* NEG mg/dL Final     Specific Gravity Urine 05/31/2017 1.015  1.003 - 1.035 Final     Blood Urine 05/31/2017 Negative  NEG Final     pH Urine 05/31/2017 6.0  5.0 - 7.0 pH Final     Protein Albumin Urine 05/31/2017 Negative  NEG mg/dL Final     Urobilinogen Urine 05/31/2017 0.2  0.2 - 1.0 EU/dL Final     Nitrite Urine 05/31/2017 Negative  NEG Final     Leukocyte Esterase Urine 05/31/2017 Trace* NEG Final     Source 05/31/2017 Midstream Urine   Final     WBC Urine 05/31/2017 2-5* 0 - 2 /HPF Final     RBC Urine 05/31/2017 O - 2  0 - 2 /HPF Final     Bacteria Urine 05/31/2017 Few* NEG /HPF Final     WBC 05/31/2017 10.8  4.0 - 11.0 10e9/L Final     RBC Count 05/31/2017 4.28* 4.4 - 5.9 10e12/L Final     Hemoglobin 05/31/2017 13.0* 13.3 - 17.7 g/dL Final     Hematocrit 05/31/2017 38.6* 40.0 - 53.0 % Final     MCV 05/31/2017 90  78 - 100 fl Final     MCH 05/31/2017 30.4  26.5 - 33.0 pg Final     MCHC 05/31/2017 33.7  31.5 - 36.5 g/dL Final     RDW 05/31/2017 13.3  10.0 - 15.0 % Final     Platelet Count 05/31/2017 287  150 - 450 10e9/L Final     Sodium 06/01/2017 139  133 - 144 mmol/L Final     Potassium 06/01/2017 3.9  3.4 - 5.3 mmol/L Final      Chloride 06/01/2017 104  94 - 109 mmol/L Final     Carbon Dioxide 06/01/2017 25  20 - 32 mmol/L Final     Anion Gap 06/01/2017 10  3 - 14 mmol/L Final     Glucose 06/01/2017 93  70 - 99 mg/dL Final     Urea Nitrogen 06/01/2017 21  7 - 30 mg/dL Final     Creatinine 06/01/2017 1.32* 0.66 - 1.25 mg/dL Final     GFR Estimate 06/01/2017 52* >60 mL/min/1.7m2 Final     GFR Estimate If Black 06/01/2017 63  >60 mL/min/1.7m2 Final     Calcium 06/01/2017 9.2  8.5 - 10.1 mg/dL Final     Interpretation ECG 06/02/2017 Click View Image link to view waveform and result   Final     Sodium 06/03/2017 142  133 - 144 mmol/L Final     Potassium 06/03/2017 3.5  3.4 - 5.3 mmol/L Final     Chloride 06/03/2017 107  94 - 109 mmol/L Final     Carbon Dioxide 06/03/2017 26  20 - 32 mmol/L Final     Anion Gap 06/03/2017 9  3 - 14 mmol/L Final     Glucose 06/03/2017 195* 70 - 99 mg/dL Final     Urea Nitrogen 06/03/2017 30  7 - 30 mg/dL Final     Creatinine 06/03/2017 1.19  0.66 - 1.25 mg/dL Final     GFR Estimate 06/03/2017 59* >60 mL/min/1.7m2 Final     GFR Estimate If Black 06/03/2017 71  >60 mL/min/1.7m2 Final     Calcium 06/03/2017 9.2  8.5 - 10.1 mg/dL Final     Phosphorus 06/03/2017 3.0  2.5 - 4.5 mg/dL Final   Admission on 05/30/2017, Discharged on 05/30/2017   Component Date Value Ref Range Status     Color Urine 05/30/2017 Yellow   Final     Appearance Urine 05/30/2017 Clear   Final     Glucose Urine 05/30/2017 Negative  NEG mg/dL Final     Bilirubin Urine 05/30/2017 Negative  NEG Final     Ketones Urine 05/30/2017 Trace* NEG mg/dL Final     Specific Gravity Urine 05/30/2017 1.020  1.003 - 1.035 Final     Blood Urine 05/30/2017 Negative  NEG Final     pH Urine 05/30/2017 6.0  5.0 - 7.0 pH Final     Protein Albumin Urine 05/30/2017 Negative  NEG mg/dL Final     Urobilinogen Urine 05/30/2017 0.2  0.2 - 1.0 EU/dL Final     Nitrite Urine 05/30/2017 Negative  NEG Final     Leukocyte Esterase Urine 05/30/2017 Trace* NEG Final     Source  05/30/2017 Midstream Urine   Final     Sodium 05/30/2017 137  133 - 144 mmol/L Final     Potassium 05/30/2017 3.9  3.4 - 5.3 mmol/L Final     Chloride 05/30/2017 102  94 - 109 mmol/L Final     Carbon Dioxide 05/30/2017 25  20 - 32 mmol/L Final     Anion Gap 05/30/2017 10  3 - 14 mmol/L Final     Glucose 05/30/2017 90  70 - 99 mg/dL Final     Urea Nitrogen 05/30/2017 21  7 - 30 mg/dL Final     Creatinine 05/30/2017 1.22  0.66 - 1.25 mg/dL Final     GFR Estimate 05/30/2017 57* >60 mL/min/1.7m2 Final     GFR Estimate If Black 05/30/2017 69  >60 mL/min/1.7m2 Final     Calcium 05/30/2017 9.1  8.5 - 10.1 mg/dL Final     WBC Urine 05/30/2017 O - 2  0 - 2 /HPF Final     RBC Urine 05/30/2017 O - 2  0 - 2 /HPF Final   Admission on 08/24/2016, Discharged on 08/24/2016   Component Date Value Ref Range Status     WBC 08/24/2016 17.1* 4.0 - 11.0 10e9/L Final     RBC Count 08/24/2016 4.67  4.4 - 5.9 10e12/L Final     Hemoglobin 08/24/2016 14.6  13.3 - 17.7 g/dL Final     Hematocrit 08/24/2016 43.0  40.0 - 53.0 % Final     MCV 08/24/2016 92  78 - 100 fl Final     MCH 08/24/2016 31.3  26.5 - 33.0 pg Final     MCHC 08/24/2016 34.0  31.5 - 36.5 g/dL Final     RDW 08/24/2016 13.3  10.0 - 15.0 % Final     Platelet Count 08/24/2016 229  150 - 450 10e9/L Final     Diff Method 08/24/2016 Automated Method   Final     % Neutrophils 08/24/2016 82.3  % Final     % Lymphocytes 08/24/2016 6.1  % Final     % Monocytes 08/24/2016 10.3  % Final     % Eosinophils 08/24/2016 0.5  % Final     % Basophils 08/24/2016 0.2  % Final     % Immature Granulocytes 08/24/2016 0.6  % Final     Nucleated RBCs 08/24/2016 0  0 /100 Final     Absolute Neutrophil 08/24/2016 14.1* 1.6 - 8.3 10e9/L Final     Absolute Lymphocytes 08/24/2016 1.1  0.8 - 5.3 10e9/L Final     Absolute Monocytes 08/24/2016 1.8* 0.0 - 1.3 10e9/L Final     Absolute Eosinophils 08/24/2016 0.1  0.0 - 0.7 10e9/L Final     Absolute Basophils 08/24/2016 0.0  0.0 - 0.2 10e9/L Final     Abs Immature  Granulocytes 08/24/2016 0.1  0 - 0.4 10e9/L Final     Absolute Nucleated RBC 08/24/2016 0.0   Final     Sodium 08/24/2016 139  133 - 144 mmol/L Final     Potassium 08/24/2016 3.7  3.4 - 5.3 mmol/L Final     Chloride 08/24/2016 103  94 - 109 mmol/L Final     Carbon Dioxide 08/24/2016 30  20 - 32 mmol/L Final     Anion Gap 08/24/2016 6  3 - 14 mmol/L Final     Glucose 08/24/2016 113* 70 - 99 mg/dL Final     Urea Nitrogen 08/24/2016 22  7 - 30 mg/dL Final     Creatinine 08/24/2016 1.23  0.66 - 1.25 mg/dL Final     GFR Estimate 08/24/2016 57* >60 mL/min/1.7m2 Final     GFR Estimate If Black 08/24/2016 69  >60 mL/min/1.7m2 Final     Calcium 08/24/2016 8.7  8.5 - 10.1 mg/dL Final   Admission on 08/24/2016, Discharged on 08/24/2016   Component Date Value Ref Range Status     Color Urine 08/24/2016 Yellow   Final     Appearance Urine 08/24/2016 Slightly Cloudy   Final     Glucose Urine 08/24/2016 Negative  NEG mg/dL Final     Bilirubin Urine 08/24/2016 Negative  NEG Final     Ketones Urine 08/24/2016 Negative  NEG mg/dL Final     Specific Gravity Urine 08/24/2016 1.015  1.003 - 1.035 Final     Blood Urine 08/24/2016 Large* NEG Final     pH Urine 08/24/2016 5.5  5.0 - 7.0 pH Final     Protein Albumin Urine 08/24/2016 30* NEG mg/dL Final     Urobilinogen mg/dL 08/24/2016 Normal  0.0 - 2.0 mg/dL Final     Nitrite Urine 08/24/2016 Positive* NEG Final     Leukocyte Esterase Urine 08/24/2016 Large* NEG Final     Source 08/24/2016 Catheterized Urine   Final     RBC Urine 08/24/2016 177* 0 - 2 /HPF Final     WBC Urine 08/24/2016 100* 0 - 2 /HPF Final     WBC Clumps 08/24/2016 Present* NEG /HPF Final     Specimen Description 08/24/2016 Catheterized Urine   Final     Culture Micro 08/24/2016 >100,000 colonies/mL Klebsiella pneumoniae*  Final     Micro Report Status 08/24/2016 FINAL 08/26/2016   Final     Organism: 08/24/2016 >100,000 colonies/mL Klebsiella pneumoniae   Final     Imaging:[KV1.1]  CT head 5/31/17, reviewed in PACS:  head is tilted to left. Asymmetric anterior temporal horns of lateral ventrical, larger on right. Hippocampal formations appear about the same size except in the most anterior sections where the right hippocampus is slightly smaller. Cortical volume appears normal for age.[KV1.3]     Impression:  Mr. Diaz is a 77 year old[KV1.1] left-[KV1.3]handed male who presents with[KV1.1] visual hallucinations since spring 2017, initially in setting of UTI, but persisting since then during and between intercurrent illnesses. On exam he has brisker reflexes on the left and diminished sensation to cold and vibration in the left lower leg. Cognitive testing reveals MMSE 27/30 with notable difficulty coping[KV1.3] ntersectin[KV1.1]g polygons. The head CT from May 2017 does not reveal a lesion or regional atrophy to account for the visual symptoms.  Labs are notable for positive amphetamines on tox screen. He denies recreational drugs. The differential dx for his symptoms includes amphetamine use, Malcom-Bonnet syndrome, and dementia with Lewy bodies. His history of visual illusions and hallucinations and exam features of difficulty drawing intersecting polygons suggest that DLB involving cortical structures without motor signs of Parkinson's disease, is possible. The symptoms persisting in daytime make Malcom-Bonnet syndrome less likely. The well formed hallucinsations are unlikely to be seizures. Further imaging is warranted to workup the hallucinations and asymmetric findings on exam.[KV1.3]     Recommendations:  1.[KV1.1] Brain MRI  2. After MRI completed, if no lesions discovered that would explain symptoms, and if chronic amphetamine use is ruled out, may start donepezil trial to see if hallucinations improve. Would begin at 5 mg qam and increase to 10 mg a day after 6 weeks.   3. If chronic amphetamine use is ruled out, he should be seen in outpatient follow up with general neurology to assess for interval change. I am  seeing new patients in [facility], and would be happy to see him as well. We are currently booked out for 6 months.   4. Outpatient neuropsychological testing to better assess his visual and non-visual cognitive abilities. Would suggest that he see Dr. Kimani Abbott in the [facility].     Thank you for the opportunity to be involved in the care of this interesting patient.[KV1.3]        Revision History        User Key Date/Time User Provider Type Action    > KV1.3 1/27/2018  8:14 PM Chung Beal MD Physician Sign     KV1.5 1/27/2018  1:03 PM Chung Beal MD Physician      KV1.2 1/27/2018 12:24 PM Chung Beal MD Physician      KV1.4 1/27/2018 10:58 AM Chung Beal MD Physician      KV1.1 1/27/2018 10:57 AM Chung Beal MD Physician                      Progress Notes - Physician (Notes from 02/03/18 through 02/06/18)      Progress Notes by Sasha Dickens MSW at 2/6/2018 10:38 AM     Author:  Sasha Dickens MSW Service:  Social Work Author Type:      Filed:  2/6/2018 12:31 PM Date of Service:  2/6/2018 10:38 AM Creation Time:  2/6/2018 10:38 AM    Status:  Signed :  Sasha Dickens MSW ()         Social Work Services Progress Note    Hospital Day: 12  Collaborated with:  [facility] at [facility],[MK1.1] Mesha WAYNE, RNCC[MK1.2]    Data:[MK1.1]  DC plan[MK1.2]    Intervention:[MK1.1]  Writer reviewed pt's chart, discussed care needs for DC plan and explore possibility of pt returning home (pt now stating he does not want TCU).    Per [facility], they do not anticipate having an opening until Thurs/Fri. Mesha will assess pt for going home. He does have Neurology Appointment on 2/7/18 at 9:30 am. Continued hospital stay jeopardizes pt's ability to keep this needed appointment for further work up and follow up.[MK1.2]    Assessment:[MK1.1]  per Mesha pt, his family and [facility] are agreeable to have  pt return to his [facility][MK1.2]    Plan:    Anticipated Disposition:[MK1.1]  [facility], RNCC Mesha following for DC plan[MK1.2]    Barriers to d/c plan:[MK1.1]  None noted-pt to be picked up at 3pm[MK1.2]    Follow Up:[MK1.1]  SW to remain available per request/referral, Proceed to DC[MK1.2]       Revision History        User Key Date/Time User Provider Type Action    > MK1.2 2/6/2018 12:31 PM Sasha Dickens MSW  Sign     MK1.1 2/6/2018 10:38 AM Sasha Dickens MSW              Progress Notes by Sasha Dickens MSW at 2/2/2018  3:23 PM     Author:  Sasha Dickens MSW Service:  Social Work Author Type:      Filed:  2/6/2018  9:58 AM Date of Service:  2/2/2018  3:23 PM Creation Time:  2/2/2018  3:23 PM    Status:  Addendum :  Sasha Dickens MSW ()         Social Work Services Progress Note    Hospital Day: 8    Collaborated with:  [facility] [phone], Admissions [facility] [phone]    Data:  DC plan    Intervention:  Writer left voicemail message for nursing staff at [facility] to have conversation about level of care they can provide pt (pt's daughter thinks pt is close to being at base line for mobility).    Writer sent referral to [facility] and they will assess. Francy in admissions will forward referral information to their [facility] as pt's daughter Mackenzie would like to explore possibility for pt to move there.    Per Francy at [facility], their TCU has limited bed availability but [facility] has studio apartments that may be available.    Assessment:  pt is ready for DC once safe placement is secured    Plan:    Anticipated Disposition:  [facility] vs. [facility]    Barriers to d/c plan:  Bed availability, assessment, placement    Follow Up:  SW cont' to follow, [facility][MK1.1] Mesha[MK1.2] informed of possibility of pt returning to [facility] (maybe Monday 2/5?)[MK1.1]       Revision History        User Key  "Date/Time User Provider Type Action    > MK1.2 2/6/2018  9:58 AM Sasha Dickens MSW  Addend     MK1.1 2/2/2018  3:26 PM Sasha Dickens MSW  Sign            Progress Notes by Jose Raul Santamaria MD at 2/5/2018 11:34 AM     Author:  Jose Raul Santamaria MD Service:  Internal Medicine Author Type:  Physician    Filed:  2/5/2018  5:39 PM Date of Service:  2/5/2018 11:34 AM Creation Time:  2/5/2018 11:34 AM    Status:  Signed :  Jose Raul Santamaria MD (Physician)         [facility], [place]   Internal Medicine Daily Note           Interval History/Events     Overnight events reviewed  No new concern  Hallucinations not as threatening as prior. Sleeping better  No fever, chills  No NV. remigio orals.   No LH or dizziness.   No cp or sob.  No focal s/s.        Review of Systems        4 point ROS including Respiratory, CV, GI and , other than that noted above is negative      Medications   I have reviewed current medications  in the \"current medication\" section of Epic.  Relevant changes include:     Physical Exam         Vital signs:    Blood pressure 121/59, pulse 94, temperature 96.3  F (35.7  C), resp. rate 16, weight 77.3 kg (170 lb 6.4 oz), SpO2 95 %.  Estimated body mass index is 28.36 kg/(m^2) as calculated from the following:    Height as of 5/31/17: 1.651 m (5' 5\").    Weight as of this encounter: 77.3 kg (170 lb 6.4 oz).        General: NAD  HEENT: No icterus, no pallor. At/nc  Cardiovascular: S1, S2 normal  Respiratory:  Non labored. Mild bl end exp wheeze.  GI/Abdomen: Soft, NT,ND  Neurology: Alert, awake, and oriented to time, place and person.   No tremors or rigidity.   Extremities: Mild pre tibial edema. Skin: multiple skin abrasions, mepilex over open areas LE   Psychiatry: pleasant.      Laboratory and Imaging Studies     I have reviewed  laboratory and imaging studies in the Epic. Pertinent findings are as below:    BMP    Recent " Labs  Lab 02/05/18 0527 02/04/18 0626 02/03/18  0542 02/02/18 0622 02/01/18  0536   * 146* 144 145* 143   POTASSIUM  --  3.9 4.1 3.8 4.1   CHLORIDE  --  114* 110* 110* 107   LOU  --  8.1* 8.5 8.4* 8.4*   CO2  --  26 29 29 31   BUN  --  16 17 17 14   CR  --  0.96 1.04 1.08 1.06   GLC  --  96 100* 98 144*     CBC    Recent Labs  Lab 02/04/18 0626 02/03/18 0542 02/02/18 0622 02/01/18  0536   WBC 9.0 8.9 9.2 9.1   RBC 4.16* 4.64 4.46 4.67   HGB 10.9* 11.9* 11.3* 11.9*   HCT 35.8* 40.0 38.4* 39.7*   MCV 86 86 86 85   MCH 26.2* 25.6* 25.3* 25.5*   MCHC 30.4* 29.8* 29.4* 30.0*   RDW 16.4* 16.2* 16.2* 16.1*    339 327 363       MR Brain w/o Contrast 01/29:     Impression:    1. No acute intracranial pathology.  2. Mild cerebral volume loss with mild chronic small vessel ischemic change.  3. Mineralization of the basal ganglia, within expected limits for age.    Impression/Plan      78 yo M with hx of HTN, CAD, COPD,  Gout, BPH s/P TURP, S/P THOMAS admitted for  worsening recurrent vs chronic hallucinations x ~ 1 year (since May 2017)       # Hallucinations: Unkown etiology . ? lewy body dementia  Drug screen positive for Amphetamine, and Opiates. Patient and family denies any h/o amphetamine or drug use. ? False positive 2/2 bupropion.   Patient reports regular use of Alcohol. CT head on 01/11 at OSH with no acute abnormality. TSH on 01/27 within normal limits. Vitamin B12, and Folic acid normal.   TPA negative.     Psychiatry and Neurology has evaluated the patient.  OT Cognition eval 01/28: Patient MOCA scoring 26/30 (score of 26 or greater is considered normal). Had mild difficulty with drawing time on clock (visuospatial/excutive), able to recall 4/5 words, and with abstraction (stating how watch and ruler are alike).      - MRI Brain : as above.   - 01/30: d/w Neurology: rec: aricept 5 and fu at Neurology clinic in 1 month. Did not start as patient started on rivastigmine patch   - 01/30: Psychiatry fu  consult reviewed. Noted psychiatry started patient on rivastigmine 1/30/2018.     1/31/2018: Continue rivastigmine for now. May be titrated as tolerated.   Dc Olanzepine and taper off bupropion as planned earlier,  as no evidence that it was helping the patient symptoms and possible making worse.    per psychiatry,not a candidate for [facility] psychiatry unit at current.     - FU with Psychiatry inpatient and outpatient prn.      - Neuro: Outpatient neuropsychological testing to better assess his visual and non-visual cognitive abilities. Would suggest that he see Dr. Kimani Abbott in the [facility].   However patient daughter Ms Ortiz wants him to fu with Neurology at [facility].     - Continue to monitor using VPM.  - a/w Placement. Sw working with the family.     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 Urine culture without growth, and patient had no urinary symptoms thus Ceftriaxone was discontinued.   - Continue to monitor.    # BPH s/p TURP on 10/30/2017: On Tamsulosin  - Continue   - Scheduled to follow up with urology on 3/9/2018    # Hx of COPD: PTA on Advair Diskus  - Continue LABA+ICS    # Hx of CAD (s/p coronary stent placement in 2003): On Atenolol. Not on Aspirin. Not sure why.   - Continue Atenolol  - Continue Aspirin    # Hx of SANTIAGO: Continue CPAP    # Hx of Hyperlipidemia: PTA Atorvastatin  - Continue    # PTA on Furosemide: No past hx of HF noted. Patient unsure of exact reason  - Cr slightly trending up.   - 02/01: will hold for now.   - encourage oral fluids.   - fu BMP  - I/o as able.       # FEN: Regular diet  # Activity : Up with assist. PT consult  # Prophylaxis:  Ambulate with assist.   # Code status: Full     Disposition Plan   Expected discharge TCU vs Assisted living-- A/w placement.      Entered: Jose Raul  Hina 02/05/2018, 11:34 AM   Sw on board for placement.             Pt's care was discussed with bedside RN, patient and  during Care Team Rounds.            Jose Raul Santamaria MD   Hospitalist (Internal Medicine)  [facility]  Pager: [phone].[SD1.1]                     Revision History        User Key Date/Time User Provider Type Action    > SD1.1 2/5/2018  5:39 PM Jose Raul Santamaria MD Physician Sign            Progress Notes by Tova Carney BSW at 2/5/2018  1:49 PM     Author:  Tova Carney BSW Service:  (none) Author Type:      Filed:  2/5/2018  2:48 PM Date of Service:  2/5/2018  1:49 PM Creation Time:  2/5/2018  1:49 PM    Status:  Addendum :  Tova Carney BSW ()         Writer was asked to talk to patient about finding housing. Pt stated that he was wondering about getting back to his [facility] since they are still charging him money. The patient also stated that he did not want to go to a TCU. Writer stated that she would follow up and either let him know the outcome or the unit  will follow up with him tomorrow. Pt also asked that the SW call his DTR's to touch base. SW will continue to follow and assist as needed.    Writer called and left a voicemail for [facility], and asked for a call back when possible to discuss the pt. SW will continue to follow and assist as needed.[JW1.1]    Addendum 2/5: Writer received a call back from Yuliet quiñones nurse at [facility], she stated that they were handling the pt's hallucinations while he was with them, but the problem being he was starting to scare the other residents and they had to hide sharp objects from him.Writer will update the unit  on this and have her follow up tomorrow. SW will continue to follow and assist as needed.[JW1.2]        TEQUILA Choudhury  Covering [address] A (Sasha Gentile)  [phone]     Revision History        User Key Date/Time User Provider Type Action    > [N/A] 2/5/2018  2:48 PM Angelika  "SANTINO Bernardo  Addend     JW1.2 2/5/2018  2:36 PM Tova Carney BSW  Addend     JW1.1 2/5/2018  1:57 PM Tova Carney BSW  Sign            Progress Notes by Ashley Ace RD at 2/5/2018 11:12 AM     Author:  Ashley Ace RD Service:  Nutrition Author Type:  Registered Dietitian    Filed:  2/5/2018 12:21 PM Date of Service:  2/5/2018 11:12 AM Creation Time:  2/5/2018 11:12 AM    Status:  Signed :  Ashley Ace RD (Registered Dietitian)         CLINICAL NUTRITION SERVICES - ASSESSMENT NOTE     Nutrition Prescription    RECOMMENDATIONS FOR MDs/PROVIDERS TO ORDER:[CO1.1]  None[CO1.2]    Malnutrition Status:[CO1.1]    Patient does not meet two of the above criteria necessary for diagnosing malnutrition[CO1.2]    Recommendations already ordered by Registered Dietitian (RD):[CO1.1]  None[CO1.2]    Future/Additional Recommendations:[CO1.1]  None[CO1.2]     REASON FOR ASSESSMENT  Deion Diaz is a 77 year old male assessed by the dietitian for LOS    NUTRITION HISTORY[CO1.1]  Patient reports he usually eats a good breakfast, skips lunch, and eats dinner. Dinner is different every night depending on what the  prepares in the kitchen at the assisted living facility he stays at. He has been trying not to snack as much an is eating yogurt everyday with something on it (fruit, cereal).[CO1.2]     CURRENT NUTRITION ORDERS  Diet: Regular  Intake/Tolerance:[CO1.1] Patient states his appetite is the same here as at home. He really enjoys the food. Last night after eating pizza he began vomiting, unsure[CO1.2]  Per flowsheets, patient eating % of meals.     LABS  Labs reviewed  Na: 146 (H)    MEDICATIONS  Medications reviewed  Multivitamin with minerals    ANTHROPOMETRICS  Height: 165.1 cm (5' 5\")  Most Recent Weight: 77.3 kg (170 lb 6.4 oz)    IBW: 62 kg  BMI: Overweight BMI 25-29.9  Weight History:[CO1.1] Patient states he usually weighs around 200 lb but has " lost about 30-40 lb over the past year due to making healthier diet choices.[CO1.2] Pt's weight has been stable between 76.3-77.3 kg during admission (x9 days).[CO1.1]  Wt Readings from Last 10 Encounters:   02/05/18 77.3 kg (170 lb 6.4 oz)   05/30/17 90.7 kg (200 lb)   09/17/16 92.1 kg (203 lb)   08/24/16 90.7 kg (200 lb)[CO1.3]     Dosing Weight: 66 kg (adjusted weight)    ASSESSED NUTRITION NEEDS  Estimated Energy Needs: 2806-7884 kcals/day (25 - 30 kcals/kg)  Justification: Maintenance  Estimated Protein Needs: 53-66 grams protein/day (0.8 - 1 grams of pro/kg)  Justification: Maintenance  Estimated Fluid Needs:  (1 mL/kcal)   Justification: Maintenance or Per provider pending fluid status    PHYSICAL FINDINGS  See malnutrition section below.    MALNUTRITION  % Intake:[CO1.1] No decreased intake noted[CO1.2]  % Weight Loss:[CO1.1] None noted - intentional weight loss for health[CO1.2]  Subcutaneous Fat Loss:[CO1.1] None observed[CO1.2]  Muscle Loss:[CO1.1] None observed[CO1.2]  Fluid Accumulation/Edema:[CO1.1] None noted[CO1.2]  Malnutrition Diagnosis:[CO1.1] Patient does not meet two of the above criteria necessary for diagnosing malnutrition[CO1.2]    NUTRITION DIAGNOSIS[CO1.1]  No nutrition diagnosis at this time[CO1.2]       INTERVENTIONS  Implementation[CO1.1]  Nutrition Education: Discussed home nutrition and current appetite/intake. No further nutrition needs at this time.[CO1.2]        Monitoring/Evaluation  Progress toward goals will be monitored and evaluated per protocol.    Ashley Ace RD, LD  Unit Pager: [phone][CO1.1]        Revision History        User Key Date/Time User Provider Type Action    > CO1.2 2/5/2018 12:21 PM Ashley Ace RD Registered Dietitian Sign     CO1.3 2/5/2018 11:13 AM Ashley Ace RD Registered Dietitian      CO1.1 2/5/2018 11:12 AM Ashley Ace RD Registered Dietitian             Progress Notes by Sima Zuniga, PT at 2/5/2018 11:25 AM      Author:  Sima Zuniga PT Service:  (none) Author Type:  Physical Therapist    Filed:  2/5/2018 11:27 AM Date of Service:  2/5/2018 11:25 AM Creation Time:  2/5/2018 11:25 AM    Status:  Signed :  Sima Zuniga PT (Physical Therapist)         30-Second Sit to Stand Test:  The test is conducted with a straight back chair, without armrests, with a 17-inch seat height.    Patient Score (reps): 0     The 30 Second Sit to Stand Test is considered a test of fall risk.  Data from [place] [employer], cosponsored by [place] Dept of Health:  8 or less times = High Fall Risk   9 to 12 times = Moderate Risk  13 or more times = Low Risk    The 30 Second Sit to Stand Test is also considered a test of leg strength and endurance.   Normative Data from Alli et al,. 2001  Reps: Men       Reps: Women         Age  60-64                14-19                       12-17                               65-69                12-18                       11-16                    70-74                12-17                       10-15              75-79                11-17                       10-15                    80-84                10-15                         9-14  85-89                 8-14                          8-13  90-94                 7-12                          4-11    Assessment (rationale for performing, application to patient s function & care plan): Completed 30 second sit to stand to assess pt balance, pt scoring 0 due to inability to complete with use of B UE    Timed Up and Go (TUG): TUG is a test of basic mobility skills. It is used to screen individuals prone to falls.  Gait assistive device used: FWW     Patient score 21.25 seconds  ?13.5 seconds indicate at risk for falls in older adults according to Manuel et al 2000.  ?30 seconds - indicates dependency in most ADL and mobility skills according to Posiadlo & Sage 1991    Minimal Detectable Change for patients with Alzheimer s = 4.09 sec   Minimal  Detectable Change for patients with Parkinson s Disease = 3.5 sec   according to Kanu & Jeff Alves 2011    Assessment (rationale for performing, application to patient s function & care plan): Completed TUG to assess patient static and dynamic balance and fall risk; pt at high risk for falls scoring 21.25 seconds with use of FWW   Minutes billed as physical performance test: 15[KA1.1]          Revision History        User Key Date/Time User Provider Type Action    > KA1.1 2/5/2018 11:27 AM Sima Zuniga, PT Physical Therapist Sign            Progress Notes by Sandy Centeno, RN at 2/4/2018  1:59 PM     Author:  Sandy Centeno RN Service:  Orthopedics Author Type:  Registered Nurse    Filed:  2/4/2018  2:04 PM Date of Service:  2/4/2018  1:59 PM Creation Time:  2/4/2018  1:59 PM    Status:  Signed :  Sandy Centeno RN (Registered Nurse)         Focus: patients status  DB: patient sitting up in chair with bed alarm and video patient monitoring on continuously. Patient extremely delightful in conversation!.   I: talked with patient about use of call system and plan of cares.   E: Patient utilized the call system well throughout the shift. Patient has family members currently visiting at bedside. Status of patient will continue to be monitored.[MC1.1]      Revision History        User Key Date/Time User Provider Type Action    > MC1.1 2/4/2018  2:04 PM Sandy Centeno, RN Registered Nurse Sign            Progress Notes by Jose Raul Santamaria MD at 2/4/2018 11:42 AM     Author:  Jose Raul Santamaria MD Service:  Internal Medicine Author Type:  Physician    Filed:  2/4/2018 11:44 AM Date of Service:  2/4/2018 11:42 AM Creation Time:  2/4/2018 11:42 AM    Status:  Signed :  Jose Raul Santamaria MD (Physician)         [facility]   Internal Medicine Daily Note           Interval History/Events     Overnight events reviewed  No new concern  Hallucinations not as  "threatening as prior. Sleeping better  No fever, chills  No NV. remigio orals.   No LH or dizziness.   No cp or sob.  No focal s/s.        Review of Systems        4 point ROS including Respiratory, CV, GI and , other than that noted above is negative      Medications   I have reviewed current medications  in the \"current medication\" section of Epic.  Relevant changes include:     Physical Exam         Vital signs:    Blood pressure 107/69, pulse 100, temperature 96.4  F (35.8  C), temperature source Oral, resp. rate 16, weight 76.3 kg (168 lb 4.8 oz), SpO2 100 %.  Estimated body mass index is 28.01 kg/(m^2) as calculated from the following:    Height as of 5/31/17: 1.651 m (5' 5\").    Weight as of this encounter: 76.3 kg (168 lb 4.8 oz).        General: NAD  HEENT: No icterus, no pallor. At/nc  Cardiovascular: S1, S2 normal  Respiratory:  Non labored. Mild bl end exp wheeze.  GI/Abdomen: Soft, NT,ND  Neurology: Alert, awake, and oriented to time, place and person.   No tremors or rigidity.   Extremities: Mild pre tibial edema. Skin: multiple skin abrasions, mepilex over open areas LE   Psychiatry: pleasant.      Laboratory and Imaging Studies     I have reviewed  laboratory and imaging studies in the Epic. Pertinent findings are as below:    BMP    Recent Labs  Lab 02/04/18 0626 02/03/18 0542 02/02/18 0622 02/01/18  0536   * 144 145* 143   POTASSIUM 3.9 4.1 3.8 4.1   CHLORIDE 114* 110* 110* 107   LOU 8.1* 8.5 8.4* 8.4*   CO2 26 29 29 31   BUN 16 17 17 14   CR 0.96 1.04 1.08 1.06   GLC 96 100* 98 144*     CBC    Recent Labs  Lab 02/04/18 0626 02/03/18 0542 02/02/18 0622 02/01/18  0536   WBC 9.0 8.9 9.2 9.1   RBC 4.16* 4.64 4.46 4.67   HGB 10.9* 11.9* 11.3* 11.9*   HCT 35.8* 40.0 38.4* 39.7*   MCV 86 86 86 85   MCH 26.2* 25.6* 25.3* 25.5*   MCHC 30.4* 29.8* 29.4* 30.0*   RDW 16.4* 16.2* 16.2* 16.1*    339 327 363       MR Brain w/o Contrast 01/29:     Impression:    1. No acute intracranial " pathology.  2. Mild cerebral volume loss with mild chronic small vessel ischemic change.  3. Mineralization of the basal ganglia, within expected limits for age.    Impression/Plan      78 yo M with hx of HTN, CAD, COPD,  Gout, BPH s/P TURP, S/P THOMAS admitted for  worsening recurrent vs chronic hallucinations x ~ 1 year (since May 2017)       # Hallucinations: Unkown etiology . ? lewy body dementia  Drug screen positive for Amphetamine, and Opiates. Patient and family denies any h/o amphetamine or drug use. ? False positive 2/2 bupropion.   Patient reports regular use of Alcohol. CT head on 01/11 at OSH with no acute abnormality. TSH on 01/27 within normal limits. Vitamin B12, and Folic acid normal.   TPA negative.     Psychiatry and Neurology has evaluated the patient.  OT Cognition eval 01/28: Patient MOCA scoring 26/30 (score of 26 or greater is considered normal). Had mild difficulty with drawing time on clock (visuospatial/excutive), able to recall 4/5 words, and with abstraction (stating how watch and ruler are alike).      - MRI Brain : as above.   - 01/30: d/w Neurology: rec: aricept 5 and fu at Neurology clinic in 1 month. Did not start as patient started on rivastigmine patch   - 01/30: Psychiatry fu consult reviewed. Noted psychiatry started patient on rivastigmine 1/30/2018.     1/31/2018: Continue rivastigmine for now. May be titrated as tolerated.   Dc Olanzepine and taper off bupropion as planned earlier,  as no evidence that it was helping the patient symptoms and possible making worse.    per psychiatry,not a candidate for [facility] psychiatry unit at current.     - FU with Psychiatry inpatient and outpatient prn.      - Neuro: Outpatient neuropsychological testing to better assess his visual and non-visual cognitive abilities. Would suggest that he see Dr. Kimani Abbott in the [facility].   However patient daughter Ms Ortiz wants him to fu with Neurology at [facility].     - Continue to monitor  using VPM.  - a/w Placement. Sw working with the family.     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 Urine culture without growth, and patient had no urinary symptoms thus Ceftriaxone was discontinued.   - Continue to monitor.    # BPH s/p TURP on 10/30/2017: On Tamsulosin  - Continue   - Scheduled to follow up with urology on 3/9/2018    # Hx of COPD: PTA on Advair Diskus  - Continue LABA+ICS    # Hx of CAD (s/p coronary stent placement in 2003): On Atenolol. Not on Aspirin. Not sure why.   - Continue Atenolol  - Continue Aspirin    # Hx of SANTIAGO: Continue CPAP    # Hx of Hyperlipidemia: PTA Atorvastatin  - Continue    # PTA on Furosemide: No past hx of HF noted. Patient unsure of exact reason  - Cr slightly trending up.   - 02/01: will hold for now.   - encourage oral fluids.   - fu BMP  - I/o as able.       # FEN: Regular diet  # Activity : Up with assist. PT consult  # Prophylaxis:  Ambulate with assist.   # Code status: Full     Disposition Plan   Expected discharge TCU vs Assisted living-- A/w placement.      Entered: Jose Raul Santamaria 02/04/2018, 11:42 AM   Sw on board for placement.             Pt's care was discussed with bedside RN, patient and  during Care Team Rounds.            Jose Raul Santamaria MD   Hospitalist (Internal Medicine)  [facility]  Pager: [phone].[SD1.1]                     Revision History        User Key Date/Time User Provider Type Action    > SD1.1 2/4/2018 11:44 AM Jose Raul Santamaria MD Physician Sign            Progress Notes by Jose Raul Santamaria MD at 2/3/2018 10:18 AM     Author:  Jose Raul Santamaria MD Service:  Internal Medicine Author Type:  Physician    Filed:  2/3/2018 10:24 AM Date of Service:  2/3/2018 10:18 AM Creation Time:  2/3/2018 10:18 AM    Status:  Signed :  Jose Raul Santamaria MD (Physician)      "    [facility], [place]   Internal Medicine Daily Note           Interval History/Events     Overnight events reviewed  No new concern  Hallucinations not as threatening as prior. Sleeping better  No fever, chills  No NV. remigio orals.   No LH or dizziness.   No cp or sob.  No focal s/s.        Review of Systems        4 point ROS including Respiratory, CV, GI and , other than that noted above is negative      Medications   I have reviewed current medications  in the \"current medication\" section of Epic.  Relevant changes include:     Physical Exam         Vital signs:[SD1.1]    Blood pressure 129/61, pulse 81, temperature 97  F (36.1  C), temperature source Oral, resp. rate 16, weight 76.3 kg (168 lb 4.8 oz), SpO2 96 %.  Estimated body mass index is 28.01 kg/(m^2) as calculated from the following:    Height as of 5/31/17: 1.651 m (5' 5\").    Weight as of this encounter: 76.3 kg (168 lb 4.8 oz).[SD1.2]        General: NAD  HEENT: No icterus, no pallor. At/nc  Cardiovascular: S1, S2 normal  Respiratory:  Non labored. Mild bl end exp wheeze.  GI/Abdomen: Soft, NT,ND  Neurology: Alert, awake, and oriented to time, place and person.   No tremors or rigidity.   Extremities: Mild pre tibial edema. Skin: multiple skin abrasions, open areas LE  Psychiatry: pleasant.      Laboratory and Imaging Studies     I have reviewed  laboratory and imaging studies in the Epic. Pertinent findings are as below:    BMP[SD1.1]    Recent Labs  Lab 02/03/18  0542 02/02/18 0622 02/01/18  0536 01/31/18  0516    145* 143 144   POTASSIUM 4.1 3.8 4.1 3.9   CHLORIDE 110* 110* 107 110*   LOU 8.5 8.4* 8.4* 8.3*   CO2 29 29 31 30   BUN 17 17 14 14   CR 1.04 1.08 1.06 1.01   * 98 144* 106*[SD1.2]     CBC[SD1.1]    Recent Labs  Lab 02/03/18  0542 02/02/18  0622 02/01/18  0536 01/31/18  0516   WBC 8.9 9.2 9.1 8.3   RBC 4.64 4.46 4.67 4.26*   HGB 11.9* 11.3* 11.9* 11.1*   HCT 40.0 38.4* 39.7* 36.3*   MCV 86 86 85 " 85   MCH 25.6* 25.3* 25.5* 26.1*   MCHC 29.8* 29.4* 30.0* 30.6*   RDW 16.2* 16.2* 16.1* 16.1*    327 363 330[SD1.2]       MR Brain w/o Contrast 01/29:     Impression:    1. No acute intracranial pathology.  2. Mild cerebral volume loss with mild chronic small vessel ischemic change.  3. Mineralization of the basal ganglia, within expected limits for age.    Impression/Plan      76 yo M with hx of HTN, CAD, COPD,  Gout, BPH s/P TURP, S/P THOMAS admitted for  worsening recurrent vs chronic hallucinations x ~ 1 year (since May 2017)       # Hallucinations: Unkown etiology . ? lewy body dementia  Drug screen positive for Amphetamine, and Opiates. Patient and family denies any h/o amphetamine or drug use. ? False positive 2/2 bupropion.   Patient reports regular use of Alcohol. CT head on 01/11 at OSH with no acute abnormality. TSH on 01/27 within normal limits. Vitamin B12, and Folic acid normal.   TPA negative.     Psychiatry and Neurology has evaluated the patient.  OT Cognition eval 01/28: Patient MOCA scoring 26/30 (score of 26 or greater is considered normal). Had mild difficulty with drawing time on clock (visuospatial/excutive), able to recall 4/5 words, and with abstraction (stating how watch and ruler are alike).      - MRI Brain : as above.   - 01/30: d/w Neurology: rec: aricept 5 and fu at Neurology clinic in 1 month. Did not start as patient started on rivastigmine patch   - 01/30: Psychiatry fu consult reviewed. Noted psychiatry started patient on rivastigmine 1/30/2018.     1/31/2018: Continue rivastigmine for now. May be titrated as tolerated.   Dc Olanzepine and taper off bupropion as planned earlier,  as no evidence that it was helping the patient symptoms and possible making worse.    per psychiatry,not a candidate for [facility] psychiatry unit at current.     - FU with Psychiatry inpatient and outpatient prn.      - Neuro: Outpatient neuropsychological testing to better assess his visual and non-visual  cognitive abilities. Would suggest that he see Dr. Kimani Abbott in the [facility].   However patient daughter Ms Ortiz wants him to fu with Neurology at [facility].     - Continue to monitor using VPM.  - a/w Placement. Mai working with the family.     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 Urine culture without growth, and patient had no urinary symptoms thus Ceftriaxone was discontinued.   - Continue to monitor.    # BPH s/p TURP on 10/30/2017: On Tamsulosin  - Continue   - Scheduled to follow up with urology on 3/9/2018    # Hx of COPD: PTA on Advair Diskus  - Continue LABA+ICS    # Hx of CAD (s/p coronary stent placement in 2003): On Atenolol. Not on Aspirin. Not sure why.   - Continue Atenolol  - Continue Aspirin    # Hx of SANTIAGO: Continue CPAP    # Hx of Hyperlipidemia: PTA Atorvastatin  - Continue    # PTA on Furosemide: No past hx of HF noted. Patient unsure of exact reason  - Cr slightly trending up.   - 02/01: will hold for now.   - monitor.       # FEN: Regular diet  # Activity : Up with assist. PT consult  # Prophylaxis:  Ambulate with assist.   # Code status: Full[SD1.1]     Disposition Plan[SD1.2]   Expected discharge TCU vs LTC. A/w placement.      Entered:[SD1.1] Jose Raul Santamaria 02/03/2018[SD1.2],[SD1.1] 10:19 AM[SD1.2]   Mai on board for placement.             Pt's care was discussed with bedside RN, patient and  during Care Team Rounds.            Jose Raul Santamaria MD   Hospitalist (Internal Medicine)  [facility]  Pager: [phone].[SD1.1]                     Revision History        User Key Date/Time User Provider Type Action    > SD1.2 2/3/2018 10:24 AM Jose Raul Santamaria MD Physician Sign     SD1.1 2/3/2018 10:18 AM Jose Raul Santamaria MD Physician                   Procedure Notes     No notes of this type exist for this  encounter.         Progress Notes - Therapies (Notes from 02/03/18 through 02/06/18)      Progress Notes by Sima Zuniga PT at 2/5/2018 11:25 AM     Author:  Sima Zuniga PT Service:  (none) Author Type:  Physical Therapist    Filed:  2/5/2018 11:27 AM Date of Service:  2/5/2018 11:25 AM Creation Time:  2/5/2018 11:25 AM    Status:  Signed :  Sima Zuniga PT (Physical Therapist)         30-Second Sit to Stand Test:  The test is conducted with a straight back chair, without armrests, with a 17-inch seat height.    Patient Score (reps): 0     The 30 Second Sit to Stand Test is considered a test of fall risk.  Data from KHARI MCNEAL, cosponsored by [place] Dept of Health:  8 or less times = High Fall Risk   9 to 12 times = Moderate Risk  13 or more times = Low Risk    The 30 Second Sit to Stand Test is also considered a test of leg strength and endurance.   Normative Data from Alli et al,. 2001  Reps: Men       Reps: Women         Age  60-64                14-19                       12-17                               65-69                12-18                       11-16                    70-74                12-17                       10-15              75-79                11-17                       10-15                    80-84                10-15                         9-14  85-89                 8-14                          8-13  90-94                 7-12                          4-11    Assessment (rationale for performing, application to patient s function & care plan): Completed 30 second sit to stand to assess pt balance, pt scoring 0 due to inability to complete with use of B UE    Timed Up and Go (TUG): TUG is a test of basic mobility skills. It is used to screen individuals prone to falls.  Gait assistive device used: FWW     Patient score 21.25 seconds  ?13.5 seconds indicate at risk for falls in older adults according to Manuel et al 2000.  ?30 seconds - indicates dependency in  most ADL and mobility skills according to Damian & Chu 1991    Minimal Detectable Change for patients with Alzheimer s = 4.09 sec   Minimal Detectable Change for patients with Parkinson s Disease = 3.5 sec   according to Kanu & Jeff Alves 2011    Assessment (rationale for performing, application to patient s function & care plan): Completed TUG to assess patient static and dynamic balance and fall risk; pt at high risk for falls scoring 21.25 seconds with use of FWW   Minutes billed as physical performance test: 15[KA1.1]          Revision History        User Key Date/Time User Provider Type Action    > KA1.1 2/5/2018 11:27 AM Sima Zuniga, PT Physical Therapist Sign

## 2018-01-27 PROBLEM — R44.3 HALLUCINATIONS: Status: ACTIVE | Noted: 2018-01-27

## 2018-01-27 LAB
ALBUMIN SERPL-MCNC: 2.7 G/DL (ref 3.4–5)
ALBUMIN UR-MCNC: 30 MG/DL
ALP SERPL-CCNC: 171 U/L (ref 40–150)
ALT SERPL W P-5'-P-CCNC: 27 U/L (ref 0–70)
AMPHETAMINES UR QL SCN: POSITIVE
ANION GAP SERPL CALCULATED.3IONS-SCNC: 9 MMOL/L (ref 3–14)
APPEARANCE UR: ABNORMAL
AST SERPL W P-5'-P-CCNC: 33 U/L (ref 0–45)
BACTERIA #/AREA URNS HPF: ABNORMAL /HPF
BARBITURATES UR QL: NEGATIVE
BASOPHILS # BLD AUTO: 0 10E9/L (ref 0–0.2)
BASOPHILS NFR BLD AUTO: 0.2 %
BENZODIAZ UR QL: NEGATIVE
BILIRUB SERPL-MCNC: 0.4 MG/DL (ref 0.2–1.3)
BILIRUB UR QL STRIP: NEGATIVE
BUN SERPL-MCNC: 23 MG/DL (ref 7–30)
CALCIUM SERPL-MCNC: 9.1 MG/DL (ref 8.5–10.1)
CANNABINOIDS UR QL SCN: NEGATIVE
CHLORIDE SERPL-SCNC: 103 MMOL/L (ref 94–109)
CO2 SERPL-SCNC: 29 MMOL/L (ref 20–32)
COCAINE UR QL: NEGATIVE
COLOR UR AUTO: YELLOW
CREAT SERPL-MCNC: 1.11 MG/DL (ref 0.66–1.25)
DIFFERENTIAL METHOD BLD: ABNORMAL
EOSINOPHIL # BLD AUTO: 0.4 10E9/L (ref 0–0.7)
EOSINOPHIL NFR BLD AUTO: 2.6 %
ERYTHROCYTE [DISTWIDTH] IN BLOOD BY AUTOMATED COUNT: 16.3 % (ref 10–15)
ETHANOL UR QL SCN: NEGATIVE
FOLATE SERPL-MCNC: 34.5 NG/ML
GFR SERPL CREATININE-BSD FRML MDRD: 64 ML/MIN/1.7M2
GLUCOSE SERPL-MCNC: 100 MG/DL (ref 70–99)
GLUCOSE UR STRIP-MCNC: NEGATIVE MG/DL
HCT VFR BLD AUTO: 36.6 % (ref 40–53)
HGB BLD-MCNC: 11.4 G/DL (ref 13.3–17.7)
HGB UR QL STRIP: ABNORMAL
IMM GRANULOCYTES # BLD: 0 10E9/L (ref 0–0.4)
IMM GRANULOCYTES NFR BLD: 0.2 %
KETONES UR STRIP-MCNC: NEGATIVE MG/DL
LEUKOCYTE ESTERASE UR QL STRIP: ABNORMAL
LYMPHOCYTES # BLD AUTO: 1.6 10E9/L (ref 0.8–5.3)
LYMPHOCYTES NFR BLD AUTO: 11.8 %
MCH RBC QN AUTO: 26.5 PG (ref 26.5–33)
MCHC RBC AUTO-ENTMCNC: 31.1 G/DL (ref 31.5–36.5)
MCV RBC AUTO: 85 FL (ref 78–100)
MONOCYTES # BLD AUTO: 1.6 10E9/L (ref 0–1.3)
MONOCYTES NFR BLD AUTO: 11.5 %
NEUTROPHILS # BLD AUTO: 9.9 10E9/L (ref 1.6–8.3)
NEUTROPHILS NFR BLD AUTO: 73.7 %
NITRATE UR QL: NEGATIVE
NRBC # BLD AUTO: 0 10*3/UL
NRBC BLD AUTO-RTO: 0 /100
OPIATES UR QL SCN: POSITIVE
PH UR STRIP: 5.5 PH (ref 5–7)
PLATELET # BLD AUTO: 332 10E9/L (ref 150–450)
POTASSIUM SERPL-SCNC: 3.3 MMOL/L (ref 3.4–5.3)
POTASSIUM SERPL-SCNC: 3.8 MMOL/L (ref 3.4–5.3)
PROT SERPL-MCNC: 7.5 G/DL (ref 6.8–8.8)
RBC # BLD AUTO: 4.31 10E12/L (ref 4.4–5.9)
RBC #/AREA URNS AUTO: 12 /HPF (ref 0–2)
SODIUM SERPL-SCNC: 141 MMOL/L (ref 133–144)
SOURCE: ABNORMAL
SP GR UR STRIP: 1.02 (ref 1–1.03)
SQUAMOUS #/AREA URNS AUTO: 1 /HPF (ref 0–1)
T PALLIDUM IGG+IGM SER QL: NEGATIVE
TSH SERPL DL<=0.005 MIU/L-ACNC: 0.89 MU/L (ref 0.4–4)
UROBILINOGEN UR STRIP-MCNC: NORMAL MG/DL (ref 0–2)
VIT B12 SERPL-MCNC: 511 PG/ML (ref 193–986)
WBC # BLD AUTO: 13.4 10E9/L (ref 4–11)
WBC #/AREA URNS AUTO: >182 /HPF (ref 0–2)

## 2018-01-27 PROCEDURE — 99222 1ST HOSP IP/OBS MODERATE 55: CPT | Mod: AI | Performed by: INTERNAL MEDICINE

## 2018-01-27 PROCEDURE — A7035 POS AIRWAY PRESS HEADGEAR: HCPCS

## 2018-01-27 PROCEDURE — 36415 COLL VENOUS BLD VENIPUNCTURE: CPT | Performed by: INTERNAL MEDICINE

## 2018-01-27 PROCEDURE — 84132 ASSAY OF SERUM POTASSIUM: CPT | Performed by: INTERNAL MEDICINE

## 2018-01-27 PROCEDURE — A9270 NON-COVERED ITEM OR SERVICE: HCPCS | Mod: GY | Performed by: INTERNAL MEDICINE

## 2018-01-27 PROCEDURE — 85025 COMPLETE CBC W/AUTO DIFF WBC: CPT | Performed by: PSYCHIATRY & NEUROLOGY

## 2018-01-27 PROCEDURE — 82607 VITAMIN B-12: CPT | Performed by: PSYCHIATRY & NEUROLOGY

## 2018-01-27 PROCEDURE — 25000128 H RX IP 250 OP 636: Performed by: INTERNAL MEDICINE

## 2018-01-27 PROCEDURE — 81001 URINALYSIS AUTO W/SCOPE: CPT | Performed by: FAMILY MEDICINE

## 2018-01-27 PROCEDURE — 87086 URINE CULTURE/COLONY COUNT: CPT | Performed by: EMERGENCY MEDICINE

## 2018-01-27 PROCEDURE — 82746 ASSAY OF FOLIC ACID SERUM: CPT | Performed by: PSYCHIATRY & NEUROLOGY

## 2018-01-27 PROCEDURE — 25000132 ZZH RX MED GY IP 250 OP 250 PS 637: Mod: GY | Performed by: INTERNAL MEDICINE

## 2018-01-27 PROCEDURE — 80307 DRUG TEST PRSMV CHEM ANLYZR: CPT | Performed by: FAMILY MEDICINE

## 2018-01-27 PROCEDURE — 80053 COMPREHEN METABOLIC PANEL: CPT | Performed by: PSYCHIATRY & NEUROLOGY

## 2018-01-27 PROCEDURE — 27210995 ZZH RX 272: Performed by: INTERNAL MEDICINE

## 2018-01-27 PROCEDURE — 40000275 ZZH STATISTIC RCP TIME EA 10 MIN

## 2018-01-27 PROCEDURE — 84443 ASSAY THYROID STIM HORMONE: CPT | Performed by: PSYCHIATRY & NEUROLOGY

## 2018-01-27 PROCEDURE — 40000983 ZZH STATISTIC HFNC ADULT NON-CPAP

## 2018-01-27 PROCEDURE — 80320 DRUG SCREEN QUANTALCOHOLS: CPT | Performed by: FAMILY MEDICINE

## 2018-01-27 PROCEDURE — 86780 TREPONEMA PALLIDUM: CPT | Performed by: PSYCHIATRY & NEUROLOGY

## 2018-01-27 PROCEDURE — 99221 1ST HOSP IP/OBS SF/LOW 40: CPT

## 2018-01-27 PROCEDURE — 12000001 ZZH R&B MED SURG/OB UMMC

## 2018-01-27 RX ORDER — LIDOCAINE 40 MG/G
CREAM TOPICAL
Status: DISCONTINUED | OUTPATIENT
Start: 2018-01-27 | End: 2018-02-06 | Stop reason: HOSPADM

## 2018-01-27 RX ORDER — AMOXICILLIN 250 MG
2 CAPSULE ORAL 2 TIMES DAILY PRN
Status: DISCONTINUED | OUTPATIENT
Start: 2018-01-27 | End: 2018-02-06 | Stop reason: HOSPADM

## 2018-01-27 RX ORDER — ALBUTEROL SULFATE 90 UG/1
2 AEROSOL, METERED RESPIRATORY (INHALATION) EVERY 4 HOURS PRN
Status: DISCONTINUED | OUTPATIENT
Start: 2018-01-27 | End: 2018-02-06 | Stop reason: HOSPADM

## 2018-01-27 RX ORDER — POTASSIUM CHLORIDE 750 MG/1
20-40 TABLET, EXTENDED RELEASE ORAL
Status: DISCONTINUED | OUTPATIENT
Start: 2018-01-27 | End: 2018-02-06 | Stop reason: HOSPADM

## 2018-01-27 RX ORDER — POTASSIUM CL/LIDO/0.9 % NACL 10MEQ/0.1L
10 INTRAVENOUS SOLUTION, PIGGYBACK (ML) INTRAVENOUS
Status: DISCONTINUED | OUTPATIENT
Start: 2018-01-27 | End: 2018-02-06 | Stop reason: HOSPADM

## 2018-01-27 RX ORDER — AMOXICILLIN 250 MG
1 CAPSULE ORAL 2 TIMES DAILY PRN
Status: DISCONTINUED | OUTPATIENT
Start: 2018-01-27 | End: 2018-02-06 | Stop reason: HOSPADM

## 2018-01-27 RX ORDER — ACETAMINOPHEN 325 MG/1
650 TABLET ORAL EVERY 8 HOURS PRN
Status: DISCONTINUED | OUTPATIENT
Start: 2018-01-27 | End: 2018-02-02

## 2018-01-27 RX ORDER — POTASSIUM CHLORIDE 7.45 MG/ML
10 INJECTION INTRAVENOUS
Status: DISCONTINUED | OUTPATIENT
Start: 2018-01-27 | End: 2018-02-06 | Stop reason: HOSPADM

## 2018-01-27 RX ORDER — ATENOLOL 50 MG/1
50 TABLET ORAL DAILY
Status: DISCONTINUED | OUTPATIENT
Start: 2018-01-27 | End: 2018-02-06 | Stop reason: HOSPADM

## 2018-01-27 RX ORDER — CLINDAMYCIN HCL 300 MG
300 CAPSULE ORAL 2 TIMES DAILY
Status: DISCONTINUED | OUTPATIENT
Start: 2018-01-27 | End: 2018-02-06 | Stop reason: HOSPADM

## 2018-01-27 RX ORDER — MULTIPLE VITAMINS W/ MINERALS TAB 9MG-400MCG
1 TAB ORAL DAILY
Status: DISCONTINUED | OUTPATIENT
Start: 2018-01-27 | End: 2018-02-06 | Stop reason: HOSPADM

## 2018-01-27 RX ORDER — POTASSIUM CHLORIDE 29.8 MG/ML
20 INJECTION INTRAVENOUS
Status: DISCONTINUED | OUTPATIENT
Start: 2018-01-27 | End: 2018-02-06 | Stop reason: HOSPADM

## 2018-01-27 RX ORDER — ALLOPURINOL 100 MG/1
100 TABLET ORAL DAILY
Status: DISCONTINUED | OUTPATIENT
Start: 2018-01-27 | End: 2018-02-06 | Stop reason: HOSPADM

## 2018-01-27 RX ORDER — ATORVASTATIN CALCIUM 20 MG/1
20 TABLET, FILM COATED ORAL
Status: DISCONTINUED | OUTPATIENT
Start: 2018-01-27 | End: 2018-02-06 | Stop reason: HOSPADM

## 2018-01-27 RX ORDER — FUROSEMIDE 20 MG
20 TABLET ORAL DAILY
Status: DISCONTINUED | OUTPATIENT
Start: 2018-01-27 | End: 2018-02-01

## 2018-01-27 RX ORDER — POLYETHYLENE GLYCOL 3350 17 G/17G
17 POWDER, FOR SOLUTION ORAL DAILY PRN
Status: DISCONTINUED | OUTPATIENT
Start: 2018-01-27 | End: 2018-02-06 | Stop reason: HOSPADM

## 2018-01-27 RX ORDER — IPRATROPIUM BROMIDE AND ALBUTEROL SULFATE 2.5; .5 MG/3ML; MG/3ML
3 SOLUTION RESPIRATORY (INHALATION) EVERY 4 HOURS PRN
Status: DISCONTINUED | OUTPATIENT
Start: 2018-01-27 | End: 2018-02-06 | Stop reason: HOSPADM

## 2018-01-27 RX ORDER — TAMSULOSIN HYDROCHLORIDE 0.4 MG/1
0.4 CAPSULE ORAL AT BEDTIME
Status: DISCONTINUED | OUTPATIENT
Start: 2018-01-27 | End: 2018-02-06 | Stop reason: HOSPADM

## 2018-01-27 RX ORDER — NALOXONE HYDROCHLORIDE 0.4 MG/ML
.1-.4 INJECTION, SOLUTION INTRAMUSCULAR; INTRAVENOUS; SUBCUTANEOUS
Status: DISCONTINUED | OUTPATIENT
Start: 2018-01-27 | End: 2018-02-06 | Stop reason: HOSPADM

## 2018-01-27 RX ORDER — OLANZAPINE 5 MG/1
5 TABLET ORAL AT BEDTIME
Status: DISCONTINUED | OUTPATIENT
Start: 2018-01-27 | End: 2018-01-29

## 2018-01-27 RX ORDER — BUPROPION HYDROCHLORIDE 150 MG/1
150 TABLET, EXTENDED RELEASE ORAL 2 TIMES DAILY
Status: DISCONTINUED | OUTPATIENT
Start: 2018-01-27 | End: 2018-01-28

## 2018-01-27 RX ORDER — POTASSIUM CHLORIDE 1.5 G/1.58G
20-40 POWDER, FOR SOLUTION ORAL
Status: DISCONTINUED | OUTPATIENT
Start: 2018-01-27 | End: 2018-02-06 | Stop reason: HOSPADM

## 2018-01-27 RX ORDER — FLUTICASONE PROPIONATE 50 MCG
2 SPRAY, SUSPENSION (ML) NASAL DAILY
Status: DISCONTINUED | OUTPATIENT
Start: 2018-01-27 | End: 2018-02-06 | Stop reason: HOSPADM

## 2018-01-27 RX ADMIN — CLINDAMYCIN HYDROCHLORIDE 300 MG: 300 CAPSULE ORAL at 08:42

## 2018-01-27 RX ADMIN — FLUTICASONE FUROATE AND VILANTEROL TRIFENATATE 1 PUFF: 200; 25 POWDER RESPIRATORY (INHALATION) at 08:47

## 2018-01-27 RX ADMIN — MULTIPLE VITAMINS W/ MINERALS TAB 1 TABLET: TAB at 08:42

## 2018-01-27 RX ADMIN — BUPROPION HYDROCHLORIDE 150 MG: 150 TABLET, FILM COATED, EXTENDED RELEASE ORAL at 08:42

## 2018-01-27 RX ADMIN — TAMSULOSIN HYDROCHLORIDE 0.4 MG: 0.4 CAPSULE ORAL at 21:59

## 2018-01-27 RX ADMIN — ACETAMINOPHEN 650 MG: 325 TABLET, FILM COATED ORAL at 21:59

## 2018-01-27 RX ADMIN — ALLOPURINOL 100 MG: 100 TABLET ORAL at 08:42

## 2018-01-27 RX ADMIN — FLUTICASONE PROPIONATE 2 SPRAY: 50 SPRAY, METERED NASAL at 08:47

## 2018-01-27 RX ADMIN — OLANZAPINE 5 MG: 5 TABLET, FILM COATED ORAL at 21:59

## 2018-01-27 RX ADMIN — BUPROPION HYDROCHLORIDE 150 MG: 150 TABLET, FILM COATED, EXTENDED RELEASE ORAL at 19:59

## 2018-01-27 RX ADMIN — ATORVASTATIN CALCIUM 20 MG: 20 TABLET, FILM COATED ORAL at 19:59

## 2018-01-27 RX ADMIN — POTASSIUM CHLORIDE 40 MEQ: 1.5 POWDER, FOR SOLUTION ORAL at 12:32

## 2018-01-27 RX ADMIN — POTASSIUM CHLORIDE 20 MEQ: 1.5 POWDER, FOR SOLUTION ORAL at 14:21

## 2018-01-27 RX ADMIN — ACETAMINOPHEN 650 MG: 325 TABLET, FILM COATED ORAL at 12:34

## 2018-01-27 RX ADMIN — CLINDAMYCIN HYDROCHLORIDE 300 MG: 300 CAPSULE ORAL at 19:59

## 2018-01-27 RX ADMIN — CEFTRIAXONE SODIUM 1 G: 10 INJECTION, POWDER, FOR SOLUTION INTRAVENOUS at 09:33

## 2018-01-27 NOTE — ED NOTES
[facility] [place]   ED Nurse to Floor Handoff     Deion Diaz is a 77 year old male who speaks English and lives with others,  in an assisted living  They arrived in the ED by car from home    ED Chief Complaint: Hallucinations (for over 6 months and increasing in frequency, resides at assisted living, has been changed from seroquel to zyprexa, not sleeping at night, leaving apartment from hallucinations, reacting to hallucinations sometimes in high risk ways, terrified)    ED Dx;   Final diagnoses:   Hallucinations of tactile sensation   Visual hallucination         Needed?: No    Allergies: No Known Allergies.  Past Medical Hx:   Past Medical History:   Diagnosis Date     Chronic kidney disease      Closed left hip fracture (H)      Emphysema of lung (H)       Baseline Mental status: cognitively impaired  Current Mental Status changes: at basesline    Infection: No  Sepsis suspected: No  Isolation type: No active isolations     Activity level - Baseline/Home:  Stand with Assist  Activity Level - Current:   Stand with Assist    Bariatric equipment needed?: No    In the ED these meds were given: Medications - No data to display    Drips running?  No    Home pump or pre-existing LDA's present? No    Labs results:   Labs Ordered and Resulted from Time of ED Arrival Up to the Time of Departure from the ED   DRUG ABUSE SCREEN 6 CHEM DEP URINE ([facility]) - Abnormal; Notable for the following:        Result Value    Amphetamine Qual Urine Positive (*)     Opiates Qualitative Urine Positive (*)     All other components within normal limits   COMPREHENSIVE METABOLIC PANEL - Abnormal; Notable for the following:     Potassium 3.3 (*)     Glucose 100 (*)     Albumin 2.7 (*)     Alkaline Phosphatase 171 (*)     All other components within normal limits   CBC WITH PLATELETS DIFFERENTIAL - Abnormal; Notable for the following:     WBC 13.4 (*)     RBC Count 4.31 (*)     Hemoglobin 11.4 (*)      Hematocrit 36.6 (*)     MCHC 31.1 (*)     RDW 16.3 (*)     Absolute Neutrophil 9.9 (*)     Absolute Monocytes 1.6 (*)     All other components within normal limits   UA MACROSCOPIC WITH REFLEX TO MICRO AND CULTURE - Abnormal; Notable for the following:     Blood Urine Small (*)     Protein Albumin Urine 30 (*)     Leukocyte Esterase Urine Large (*)     RBC Urine 12 (*)     WBC Urine >182 (*)     Bacteria Urine Few (*)     All other components within normal limits   TSH WITH FREE T4 REFLEX   URINE CULTURE AEROBIC BACTERIAL       Imaging Studies: No results found for this or any previous visit (from the past 24 hour(s)).    Recent vital signs:   /48  Pulse 86  Temp 96.1  F (35.6  C) (Oral)  Resp 16  Wt 73.9 kg (163 lb)  SpO2 95%  BMI 27.12 kg/m2    Cardiac Rhythm: Normal Sinus  Pt needs tele? No  Skin/wound Issues: None    Code Status: Full Code    Pain control: pt had none    Nausea control: pt had none    Abnormal labs/tests/findings requiring intervention:     Family present during ED course? Yes   Family Comments/Social Situation comments:     Tasks needing completion: None    Berry Kim, RN  [facility]--   [phone]  [phone] [facility]

## 2018-01-27 NOTE — PLAN OF CARE
Problem: Patient Care Overview  Goal: Individualization & Mutuality  Outcome: No Change  [id number] Shift Summary: VSS. Pt reported headache this shift with relief from tylenol. Pt also reporting dry eyes with relief from artificial tears. Given meds as scheduled. Lasix and atenolol held this AM due to soft BP of 91/40. Pt sleeping most of this shift as he did not sleep overnight in ED. Orthostatic BPs need to be obtained. Pt has dressings on bilat shins from wounds prior to admission (pt reports running into sink at home with his scooter). CMS intact. 2+ pedal pulses. Denies cp, sob, calf pain, nausea. Tolerating regular diet. Pt was alert, oriented, and appropriate this shift without reports of delusions. Bed alarm when patient alone in room - tripped once when patient rolled over and once when patient sat up during rounds. Continuing to assess patient behavior as 1:1 sitter may be needed - no evidence of need this shift. Son-in-law was present [id number]. Daughter present midday to present. If rounding occurs when family is not present, daughter Nuha to be called. Pt up with SBA and walker. Voiding adequately. Last BM today. Able to make needs known. Call light within reach. Continue to monitor.

## 2018-01-27 NOTE — H&P
"History and Physical     Deion Diaz MRN# [id number]   YOB: 1940 Age: 77 year old      Date of Admission: 1/26/2018  Primary care provider: Jerrod Graves            Assessment and Plan:   78 yo M with worsening and recurrent vs chronic hallucinations x 1 year without prior psychiatric or dementia history.    # worsening visual and tactile hallucinations: differential is broad. Nutritional, metabolic, primary psychosis, delirium, dementia with psychosis, neurosyphilis, paraneoplastic syndrome, ingestion. UDS positive for amphetamines, which is not on med list but could be cross reactive or true positive due to ingestion. Has now had poor sleep and disrupted sleep wake cycle. TSH normal in Jan at [facility]     - Neuro consult   - consider RPR   - ct zyprexa QHS, bupropion BID   - currently not a danger to himself or others but may need 1:1     Chronic medical problems    CAD: atorvastatin  HTN: atenolol, furosemide   Gout: allopurinol  S/p left hip replacement: continue clindamycin suppressive therapy  BPH s/p TURP: ct flomax         Lines and tubes: PIV  FEN: regular   DVT PPx: ambulate with assist. Falls risk and delirium risk  Code status: full  Disposition: admit to medicine for neuro consult            Yue Jc Le   Med Peds PGY4   [id number]                    Chief Complaint:   Worsening hallucinations     History is obtained mostly from patient's son in law, and chart review  Patient has limited ability to participate currently         History of Present Illness:   Deion Diaz is a 77 year old male with a history of CAD s/p stent 2003, HTN, CKD, COPD, SANTIAGO, who presents with persistent (versus recurrent) and worsening auditory and tactile hallucinations over the past year.     Patient's son in law is here currently but lives in [place] so much of this history is from a dc summary. Admitted to [facility] 1/11-1/15/18     \"He was admitted to [facility] on 1/11/2018 after " "presenting to [facility] ED for the evaluation of visual (sees animals, maggots, snakes, people trying to hurt him) and tactile hallucinations, which are ongoing after a UTI 1 year ago. The day PTA, he called the police 5x due to hallucinations. Follows with Psychiatry and treated with Seroquel until recently.     Patient is S/p left THOMAS 8/7/17 by Dr. Campos Gentile. 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 Continues on suppressive antibiotics, clindamycin for staph infection.  Underwent XPS laser TURP on 10/30/17 by Dr. Pierre. Prior to surgery had an indwelling saldana catheter.       [facility] for UTI, bronchitis, hallucinations, and neck pain after falls. Head/Neck CT negative for acute pathology, but showed significant degenerative changes of cervical spine. UC 12/6 grew klebsiella and pseudomonas. UC 12/10 negative. Renal US with normal kidneys, no stones. Significantly enlarged prostate and small amount of echogenic debris in the lumen of the bladder. Discharged to [facility] TCU and returned to assisted living on 12/27. Of note, infections exacerbate hallucinations.\"        Son in law and daughter brought him to [place] ED to be evaluated due to the increasing violence of the hallucinations and concern about his safety. Wanted him to be admitted to Geriatric Psych unit per previous discussion with PCP. He currently lives at an [facility] and is relatively independent at baseline, but in the past few weeks, he has, for example, held a  knife to his toes to clean the bugs off of them. He's taken a hammer to his scooter because one of his recurrent hallucinations is a woman who one time shrunk down and hid inside the engine. He has been following with outpatient psychiatry with some changes in meds (switch from seroquel to zyprexa) with no effect. Some of the hallucinations preivously were thought to be related to " or at least exacerbated by infections, yousif with the above surgeries and complications. Per family, he did not have problems with confusion or hallucination or forgetfulness before about a year ago.     Interestingly, he is aware of his hallucinations and has said that it feels like he's living in two different worlds and is getting pulled more and more into the world with hallucinations. Per son in law, he is able to recognize family members and talk logically and accurately about politics and his past. Unclear history of head traumas/concussions in his younger years. Denies CP, dyspnea, abd pain, runny nose, fever, cough. Still able to eat and drink normally and has not lost weight.                Past Medical History:     Past Medical History:   Diagnosis Date     Chronic kidney disease      Closed left hip fracture (H)      Emphysema of lung (H)              Past Surgical History:     Past Surgical History:   Procedure Laterality Date     BACK SURGERY      neck, back      CARDIAC SURGERY      stent X1              Social History:     Social History   Substance Use Topics     Smoking status: Former Smoker     Packs/day: 1.50     Years: 40.00     Quit date: 5/31/2000     Smokeless tobacco: Never Used     Alcohol use Yes      Comment: none recently             Family History:   No family history on file.  Unable to obtain due to patient mental status          Allergies:   No Known Allergies          Medications:     Med list reconciled with list from [facility]     Prescriptions Prior to Admission   Medication Sig Dispense Refill Last Dose     furosemide (LASIX) 40 MG tablet Take 1 tablet (40 mg) by mouth daily Hold for SBP<100 7 tablet 0      gabapentin (NEURONTIN) 300 MG capsule Take 1 capsule (300 mg) by mouth At Bedtime for 7 days 7 capsule 0      acetaminophen (TYLENOL) 325 MG tablet Take 2 tablets (650 mg) by mouth 3 times daily 100 tablet       buPROPion (WELLBUTRIN XL) 150 MG 24 hr tablet Take 1 tablet (150 mg) by  mouth daily for 7 days 7 tablet 0      HYDROcodone-acetaminophen (NORCO) 5-325 MG per tablet Take 1 tablet by mouth every 4 hours as needed for moderate to severe pain 21 tablet 0      NONFORMULARY Hydrocortisone 2.5% cream mixed 1:1 with Vanicream apply BID from neck down to affected area   5/31/2017 at am     Ca Carbonate-Mag Hydroxide (ROLAIDS PO) Take by mouth as needed   prn     TAMSULOSIN HCL PO Take 0.4 mg by mouth At Bedtime   5/30/2017 at hs     fluticasone-salmeterol (ADVAIR) 500-50 MCG/DOSE diskus inhaler Inhale 1 puff into the lungs every 12 hours   5/31/2017 at am     albuterol (PROAIR HFA, PROVENTIL HFA, VENTOLIN HFA) 108 (90 BASE) MCG/ACT inhaler Inhale 2 puffs into the lungs every 6 hours as needed    prn     ALLOPURINOL PO Take 100 mg by mouth daily    5/31/2017 at am     ATENOLOL PO Take 50 mg by mouth daily    5/31/2017 at am     ATORVASTATIN CALCIUM PO Take 20 mg by mouth daily         multivitamin, therapeutic with minerals (MULTI-VITAMIN) TABS Take 1 tablet by mouth daily   5/31/2017 at am     NITROGLYCERIN SL Place 0.4 mg under the tongue   prn             Review of Systems:   unable to obtain complete 10 point ROS due to patient mental status            Physical Exam:   /60  Pulse 84  Temp 97.3  F (36.3  C) (Oral)  Resp 18  Wt 73.9 kg (163 lb)  SpO2 92%  BMI 27.12 kg/m2    GEN: sleeping, arouses easily but having difficulty staying awake to talk, NAD  HEAD/NECK: NCAT. Neck supple. No lymphadenopathy.    ENT: Normal conjunctivae. Oropharynx with no erythema, no exudates.    CV: RRR, no rubs/gallops/murmurs  RESP: breathing comfortably on room air, CTA bilaterally  ABD/GI: soft, obese, NTND. No rebound, no guarding. Normal BS  DERM: no suspicious lesions or rashes in exposed skin  EXT: warm, well-perfused. No pitting edema, 2+ pedal pulses bilaterally. Both shins with diffuse scabs and shallow lacerations  NEURO: oriented to self, situation. CN2-12 intact. Sensation and strength  grossly intact, 4/5 strength but symmetric. MAEE  PSYCH: somewhat coherent and appropriate though sleepy     LABS  utox + for amphetamines and opiates   UA with pyuria     IMAGING    CT head in May 2017 wnl, no masses or bleeds  Also 1/11/18 at [facility], wnl

## 2018-01-27 NOTE — PLAN OF CARE
Problem: Infection, Risk/Actual (Adult)  Goal: Identify Related Risk Factors and Signs and Symptoms  Related risk factors and signs and symptoms are identified upon initiation of Human Response Clinical Practice Guideline (CPG).   Patient transferred from ED at 5:45 AM in W/C accompanied by son-in-law Berry. Patient transferred Ax1 to bed. Pt is A&O to self and place, unable to explain why he came to ED or time/day. Pt denies pain. VS stable & on RA. Pt has multiple areas of redness and scabs on bilateral LE/UE. Per family, pt ambulates Ax1 with walker. Bed alarm set. Patient & family oriented to room and call light. MD aware of arrival and placing orders. Will continue to monitor.

## 2018-01-27 NOTE — CONSULTS
Consult requested regarding whether visual hallucinations could be secondary to dementia    Chief Complaint: Daily visual hallucinations    History of Present Illness:  Mr. Diaz is a 77 year old left-handed male presenting for evaluation of visual hallucinations since Spring 2017. He is a good historian and his daughter Mackenzie is also present to give additional details.    Mr. Diaz was in his usual state of health, having retired 20 yrs ago, and living alone with minimal care until the spring 2017 at which time he developed visual hallucinations in the setting of a UTI. Since that time the hallucinations have continued during and between intercurrent illnesses, which have included left hip fracture requiring oxycodone and treatment of staph infection, AND UTI treated with Bactrim.     The main hallucination is of a young woman in her 20s, professionally dressed, like an , who visits him day or night. She is sometimes accompanied by children or tall adults. He says the woman wants to be in a relationship with him. Last night she crawled under his bed and reached through the bed to grab him.  He has illusions of squirrels or bears when reading or watching TV.  In addition he has seen bugs, and had a knife taken away because he was trying to cut his toe to get the bugs out. The hallucinations become more threatening and violent at night and he has called the police about them several times. They diminish when he goes to common areas with other people around. They are also exacerbated by sleep deprivation and infections. Treatment with Seroquel and more recently Zyprexa has not improved the hallucinations.     Initial workup included seeing an ophthalmologist who diagnosed him with floaters in the left eye and a cataract that needs to be removed in the right eye. Of note, he reportedly does not have macular degeneration and the ophthalmologist does not think he is experiencing Malcom-Bonnet syndrome.      Regarding other cognitive symptoms, he has not experienced any memory loss, visuospatial changes outside of hallucinations, or language changes. He remembers newly learned information such as dances and plays of his grandchildren. He recognizes faces of family members. Executive function is difficult to froy because he spends much of his days watching TV.     His motor function has declined as he recovers from a left hip replacement, but he has had no changes in facial expression, posture, or gait. He has symptoms of restless legs, but no witness dream enactment or limb movement in sleep, although sleeps alone. He has some reduction in fine motor skills. He no longer drives. He fell back on his head a few weeks ago with no concussive symptoms or LOC. Head CT at that time was reportedly negative.     Medical review of systems: He lost about 40 lbs after hip surgery. Has good appetite. The comprehensive 10-point review of systems is otherwise reviewed and negative.     Patient Active Problem List   Diagnosis     Failure to thrive in adult     Hallucination, drug-induced (H)     Hallucinations     Past Medical History:   Diagnosis Date     Chronic kidney disease      Closed left hip fracture (H)      Emphysema of lung (H)    CAD  Chronic cough  Retention of urine  Cystitis  Essential hypertension    Past Surgical History:   Procedure Laterality Date     BACK SURGERY      neck, back      CARDIAC SURGERY      stent X1      Medications  Current Facility-Administered Medications   Medication     acetaminophen (TYLENOL) tablet 650 mg     albuterol (PROAIR HFA/PROVENTIL HFA/VENTOLIN HFA) Inhaler 2 puff     atenolol (TENORMIN) tablet 50 mg     allopurinol (ZYLOPRIM) tablet 100 mg     atorvastatin (LIPITOR) tablet 20 mg     buPROPion (WELLBUTRIN SR) 12 hr tablet 150 mg     fluticasone-vilanterol (BREO ELLIPTA) 200-25 MCG/INH oral inhaler 1 puff     furosemide (LASIX) tablet 20 mg     multivitamin, therapeutic with minerals  (THERA-VIT-M) tablet 1 tablet     tamsulosin (FLOMAX) capsule 0.4 mg     clindamycin (CLEOCIN) capsule 300 mg     fluticasone (FLONASE) 50 MCG/ACT spray 2 spray     OLANZapine (zyPREXA) tablet 5 mg     naloxone (NARCAN) injection 0.1-0.4 mg     melatonin tablet 1 mg     lidocaine 1 % 1 mL     lidocaine (LMX4) kit     sodium chloride (PF) 0.9% PF flush 3 mL     sodium chloride (PF) 0.9% PF flush 3 mL     Contraindications to both pharmacological and mechanical prophylaxis (must document contraindications for both in this order)     senna-docusate (SENOKOT-S;PERICOLACE) 8.6-50 MG per tablet 1 tablet    Or     senna-docusate (SENOKOT-S;PERICOLACE) 8.6-50 MG per tablet 2 tablet     polyethylene glycol (MIRALAX/GLYCOLAX) Packet 17 g     cefTRIAXone (ROCEPHIN) 1 g in 10 mL SWFI Premix Syringe     ipratropium - albuterol 0.5 mg/2.5 mg/3 mL (DUONEB) neb solution 3 mL     potassium chloride SA (K-DUR/KLOR-CON M) CR tablet 20-40 mEq     potassium chloride (KLOR-CON) Packet 20-40 mEq     potassium chloride 10 mEq in 100 mL sterile water intermittent infusion (premix)     potassium chloride 10 mEq in 100 mL intermittent infusion with 10 mg lidocaine     potassium chloride 20 mEq in 50 mL intermittent infusion     hypromellose-dextran (ARTIFICAL TEARS) ophthalmic solution 1 drop          Allergies: No Known Allergies    Family History:   Both parents  at a young age, when the patient was a teenager. Father had cancer and committed suicide, mother  in car accident. Is middle child of 10. Three sisters  with Alzheimer's disease diagnosed in older age. Has two daughters.     Social History     Social History     Marital status:      Spouse name: N/A     Number of children: N/A     Years of education: N/A     Occupational History     Not on file.     Social History Main Topics     Smoking status: Former Smoker     Packs/day: 1.50     Years: 40.00     Quit date: 2000     Smokeless tobacco: Never Used      Alcohol use Yes      Comment: none recently     Drug use: No     Sexual activity: Not on file     Other Topics Concern     Not on file     Social History Narrative   High school education, worked as , retired 20 years ago. No history of LSD use. Denies recreational drugs.    General Physical examination:  BP 91/40 (BP Location: Right arm)  Pulse 74  Temp 97.4  F (36.3  C) (Axillary)  Resp 16  Wt 73.9 kg (163 lb)  SpO2 94%  BMI 27.12 kg/m2     General: Mr. Diaz is mildly overweight, and is appropriately groomed and dressed.  Chest: Clear to auscultation bilaterally.  Heart: Regular rhythm with no murmurs, rubs, or gallops.  Ext: warm, no edema  Skin: clean, several abrasions on legs with bandages, and dusky appearance of calves.    Mental status: Mr. Diaz  is awake, alert, and appropriate, with fluent speech, no word finding difficulties and no difficulty in answering questions. He is well oriented to time, place and person. His memory for remote events is intact. His memory for recent events is normal. Attention and concentration are intact. His fund of knowledge is fair. No apraxia is noted.  On MMSE he scored 27/30 with points deducted for orientation (-1 for floor), recall (-1), and copying intersecting polygons (-1)  Cranial nerves: Pupils are equal, round and reactive to light bilaterally. Visual fields are full to confrontation with no visual extinction. Extraocular movements are intact except for slightly impaired upgaze. Smooth pursuit is intact. Saccades are normal in initiation, velocity and amplitude both vertically and horizontally. Facial sensation is intact to light touch. Facial strength is full bilaterally. Hearing is intact to finger rub bilaterally. The tongue protrudes in the midline with intact strength. There are no tongue fasciculations noted. The soft palate elevates symmetrically. Sternocledomastoid and trapezius muscle strength is full bilaterally.  Motor  examination: Bulk is normal throughout. Axial and upper and lower extremity tone is normal. No pronator drift is noted. Strength is 5/5 and symmetric throughout. No fasciculations are noted. There is no tremor or other involuntary movement.  Sensory examination: Sensation is diminished to cold and vibration in the left lower leg and intact to proprioception.   Reflexes: Reflexes are 2+ at biceps, triceps, and brachioradialis. Patellar reflexes are 2+ on left and 1+ on right. Achilles reflexes are 2+ on left and 1+ on right. Plantar response is flexor bilaterally.  Coordination: Finger-nose-finger is normal with no dysmetria bilaterally.  Slightly slowed finger tapping and opening and closing of fist bilaterally. Full movements when pantomiming screwing in light bulb above hand. Foot tapping is not slowed.  Gait: He has a mildly stooped posture and steady stance, using walker, with normal stride length and arm swing.     Labs:  Admission on 01/26/2018   Component Date Value Ref Range Status     Amphetamine Qual Urine 01/27/2018 Positive* NEG^Negative Final     Barbiturates Qual Urine 01/27/2018 Negative  NEG^Negative Final     Benzodiazepine Qual Urine 01/27/2018 Negative  NEG^Negative Final     Cannabinoids Qual Urine 01/27/2018 Negative  NEG^Negative Final     Cocaine Qual Urine 01/27/2018 Negative  NEG^Negative Final     Ethanol Qual Urine 01/27/2018 Negative  NEG^Negative Final     Opiates Qualitative Urine 01/27/2018 Positive* NEG^Negative Final     Sodium 01/27/2018 141  133 - 144 mmol/L Final     Potassium 01/27/2018 3.3* 3.4 - 5.3 mmol/L Final     Chloride 01/27/2018 103  94 - 109 mmol/L Final     Carbon Dioxide 01/27/2018 29  20 - 32 mmol/L Final     Anion Gap 01/27/2018 9  3 - 14 mmol/L Final     Glucose 01/27/2018 100* 70 - 99 mg/dL Final     Urea Nitrogen 01/27/2018 23  7 - 30 mg/dL Final     Creatinine 01/27/2018 1.11  0.66 - 1.25 mg/dL Final     GFR Estimate 01/27/2018 64  >60 mL/min/1.7m2 Final     GFR  Estimate If Black 01/27/2018 77  >60 mL/min/1.7m2 Final     Calcium 01/27/2018 9.1  8.5 - 10.1 mg/dL Final     Bilirubin Total 01/27/2018 0.4  0.2 - 1.3 mg/dL Final     Albumin 01/27/2018 2.7* 3.4 - 5.0 g/dL Final     Protein Total 01/27/2018 7.5  6.8 - 8.8 g/dL Final     Alkaline Phosphatase 01/27/2018 171* 40 - 150 U/L Final     ALT 01/27/2018 27  0 - 70 U/L Final     AST 01/27/2018 33  0 - 45 U/L Final     WBC 01/27/2018 13.4* 4.0 - 11.0 10e9/L Final     RBC Count 01/27/2018 4.31* 4.4 - 5.9 10e12/L Final     Hemoglobin 01/27/2018 11.4* 13.3 - 17.7 g/dL Final     Hematocrit 01/27/2018 36.6* 40.0 - 53.0 % Final     MCV 01/27/2018 85  78 - 100 fl Final     MCH 01/27/2018 26.5  26.5 - 33.0 pg Final     MCHC 01/27/2018 31.1* 31.5 - 36.5 g/dL Final     RDW 01/27/2018 16.3* 10.0 - 15.0 % Final     Platelet Count 01/27/2018 332  150 - 450 10e9/L Final     Diff Method 01/27/2018 Automated Method   Final     % Neutrophils 01/27/2018 73.7  % Final     % Lymphocytes 01/27/2018 11.8  % Final     % Monocytes 01/27/2018 11.5  % Final     % Eosinophils 01/27/2018 2.6  % Final     % Basophils 01/27/2018 0.2  % Final     % Immature Granulocytes 01/27/2018 0.2  % Final     Nucleated RBCs 01/27/2018 0  0 /100 Final     Absolute Neutrophil 01/27/2018 9.9* 1.6 - 8.3 10e9/L Final     Absolute Lymphocytes 01/27/2018 1.6  0.8 - 5.3 10e9/L Final     Absolute Monocytes 01/27/2018 1.6* 0.0 - 1.3 10e9/L Final     Absolute Eosinophils 01/27/2018 0.4  0.0 - 0.7 10e9/L Final     Absolute Basophils 01/27/2018 0.0  0.0 - 0.2 10e9/L Final     Abs Immature Granulocytes 01/27/2018 0.0  0 - 0.4 10e9/L Final     Absolute Nucleated RBC 01/27/2018 0.0   Final     TSH 01/27/2018 0.89  0.40 - 4.00 mU/L Final     Color Urine 01/27/2018 Yellow   Final     Appearance Urine 01/27/2018 Slightly Cloudy   Final     Glucose Urine 01/27/2018 Negative  NEG^Negative mg/dL Final     Bilirubin Urine 01/27/2018 Negative  NEG^Negative Final     Ketones Urine 01/27/2018  Negative  NEG^Negative mg/dL Final     Specific Gravity Urine 01/27/2018 1.016  1.003 - 1.035 Final     Blood Urine 01/27/2018 Small* NEG^Negative Final     pH Urine 01/27/2018 5.5  5.0 - 7.0 pH Final     Protein Albumin Urine 01/27/2018 30* NEG^Negative mg/dL Final     Urobilinogen mg/dL 01/27/2018 Normal  0.0 - 2.0 mg/dL Final     Nitrite Urine 01/27/2018 Negative  NEG^Negative Final     Leukocyte Esterase Urine 01/27/2018 Large* NEG^Negative Final     Source 01/27/2018 Midstream Urine   Final     RBC Urine 01/27/2018 12* 0 - 2 /HPF Final     WBC Urine 01/27/2018 >182* 0 - 2 /HPF Final     Bacteria Urine 01/27/2018 Few* NEG^Negative /HPF Final     Squamous Epithelial /HPF Urine 01/27/2018 1  0 - 1 /HPF Final     Specimen Description 01/27/2018 Midstream Urine   Final     Special Requests 01/27/2018 Specimen received in preservative   Preliminary     Culture Micro 01/27/2018 PENDING   Preliminary   Admission on 05/31/2017, Discharged on 06/03/2017   Component Date Value Ref Range Status     WBC 05/31/2017 10.5  4.0 - 11.0 10e9/L Final     RBC Count 05/31/2017 4.75  4.4 - 5.9 10e12/L Final     Hemoglobin 05/31/2017 14.4  13.3 - 17.7 g/dL Final     Hematocrit 05/31/2017 43.2  40.0 - 53.0 % Final     MCV 05/31/2017 91  78 - 100 fl Final     MCH 05/31/2017 30.3  26.5 - 33.0 pg Final     MCHC 05/31/2017 33.3  31.5 - 36.5 g/dL Final     RDW 05/31/2017 13.3  10.0 - 15.0 % Final     Platelet Count 05/31/2017 327  150 - 450 10e9/L Final     Diff Method 05/31/2017 Automated Method   Final     % Neutrophils 05/31/2017 76.2  % Final     % Lymphocytes 05/31/2017 11.1  % Final     % Monocytes 05/31/2017 11.2  % Final     % Eosinophils 05/31/2017 1.0  % Final     % Basophils 05/31/2017 0.2  % Final     % Immature Granulocytes 05/31/2017 0.3  % Final     Nucleated RBCs 05/31/2017 0  0 /100 Final     Absolute Neutrophil 05/31/2017 8.0  1.6 - 8.3 10e9/L Final     Absolute Lymphocytes 05/31/2017 1.2  0.8 - 5.3 10e9/L Final      Absolute Monocytes 05/31/2017 1.2  0.0 - 1.3 10e9/L Final     Absolute Eosinophils 05/31/2017 0.1  0.0 - 0.7 10e9/L Final     Absolute Basophils 05/31/2017 0.0  0.0 - 0.2 10e9/L Final     Abs Immature Granulocytes 05/31/2017 0.0  0 - 0.4 10e9/L Final     Absolute Nucleated RBC 05/31/2017 0.0   Final     Sodium 05/31/2017 137  133 - 144 mmol/L Final     Potassium 05/31/2017 4.0  3.4 - 5.3 mmol/L Final     Chloride 05/31/2017 102  94 - 109 mmol/L Final     Carbon Dioxide 05/31/2017 27  20 - 32 mmol/L Final     Anion Gap 05/31/2017 8  3 - 14 mmol/L Final     Glucose 05/31/2017 86  70 - 99 mg/dL Final     Urea Nitrogen 05/31/2017 21  7 - 30 mg/dL Final     Creatinine 05/31/2017 1.33* 0.66 - 1.25 mg/dL Final     GFR Estimate 05/31/2017 52* >60 mL/min/1.7m2 Final     GFR Estimate If Black 05/31/2017 63  >60 mL/min/1.7m2 Final     Calcium 05/31/2017 9.7  8.5 - 10.1 mg/dL Final     Color Urine 05/31/2017 Yellow   Final     Appearance Urine 05/31/2017 Clear   Final     Glucose Urine 05/31/2017 Negative  NEG mg/dL Final     Bilirubin Urine 05/31/2017 Negative  NEG Final     Ketones Urine 05/31/2017 15* NEG mg/dL Final     Specific Gravity Urine 05/31/2017 1.015  1.003 - 1.035 Final     Blood Urine 05/31/2017 Negative  NEG Final     pH Urine 05/31/2017 6.0  5.0 - 7.0 pH Final     Protein Albumin Urine 05/31/2017 Negative  NEG mg/dL Final     Urobilinogen Urine 05/31/2017 0.2  0.2 - 1.0 EU/dL Final     Nitrite Urine 05/31/2017 Negative  NEG Final     Leukocyte Esterase Urine 05/31/2017 Trace* NEG Final     Source 05/31/2017 Midstream Urine   Final     WBC Urine 05/31/2017 2-5* 0 - 2 /HPF Final     RBC Urine 05/31/2017 O - 2  0 - 2 /HPF Final     Bacteria Urine 05/31/2017 Few* NEG /HPF Final     WBC 05/31/2017 10.8  4.0 - 11.0 10e9/L Final     RBC Count 05/31/2017 4.28* 4.4 - 5.9 10e12/L Final     Hemoglobin 05/31/2017 13.0* 13.3 - 17.7 g/dL Final     Hematocrit 05/31/2017 38.6* 40.0 - 53.0 % Final     MCV 05/31/2017 90  78 -  100 fl Final     MCH 05/31/2017 30.4  26.5 - 33.0 pg Final     MCHC 05/31/2017 33.7  31.5 - 36.5 g/dL Final     RDW 05/31/2017 13.3  10.0 - 15.0 % Final     Platelet Count 05/31/2017 287  150 - 450 10e9/L Final     Sodium 06/01/2017 139  133 - 144 mmol/L Final     Potassium 06/01/2017 3.9  3.4 - 5.3 mmol/L Final     Chloride 06/01/2017 104  94 - 109 mmol/L Final     Carbon Dioxide 06/01/2017 25  20 - 32 mmol/L Final     Anion Gap 06/01/2017 10  3 - 14 mmol/L Final     Glucose 06/01/2017 93  70 - 99 mg/dL Final     Urea Nitrogen 06/01/2017 21  7 - 30 mg/dL Final     Creatinine 06/01/2017 1.32* 0.66 - 1.25 mg/dL Final     GFR Estimate 06/01/2017 52* >60 mL/min/1.7m2 Final     GFR Estimate If Black 06/01/2017 63  >60 mL/min/1.7m2 Final     Calcium 06/01/2017 9.2  8.5 - 10.1 mg/dL Final     Interpretation ECG 06/02/2017 Click View Image link to view waveform and result   Final     Sodium 06/03/2017 142  133 - 144 mmol/L Final     Potassium 06/03/2017 3.5  3.4 - 5.3 mmol/L Final     Chloride 06/03/2017 107  94 - 109 mmol/L Final     Carbon Dioxide 06/03/2017 26  20 - 32 mmol/L Final     Anion Gap 06/03/2017 9  3 - 14 mmol/L Final     Glucose 06/03/2017 195* 70 - 99 mg/dL Final     Urea Nitrogen 06/03/2017 30  7 - 30 mg/dL Final     Creatinine 06/03/2017 1.19  0.66 - 1.25 mg/dL Final     GFR Estimate 06/03/2017 59* >60 mL/min/1.7m2 Final     GFR Estimate If Black 06/03/2017 71  >60 mL/min/1.7m2 Final     Calcium 06/03/2017 9.2  8.5 - 10.1 mg/dL Final     Phosphorus 06/03/2017 3.0  2.5 - 4.5 mg/dL Final   Admission on 05/30/2017, Discharged on 05/30/2017   Component Date Value Ref Range Status     Color Urine 05/30/2017 Yellow   Final     Appearance Urine 05/30/2017 Clear   Final     Glucose Urine 05/30/2017 Negative  NEG mg/dL Final     Bilirubin Urine 05/30/2017 Negative  NEG Final     Ketones Urine 05/30/2017 Trace* NEG mg/dL Final     Specific Gravity Urine 05/30/2017 1.020  1.003 - 1.035 Final     Blood Urine  05/30/2017 Negative  NEG Final     pH Urine 05/30/2017 6.0  5.0 - 7.0 pH Final     Protein Albumin Urine 05/30/2017 Negative  NEG mg/dL Final     Urobilinogen Urine 05/30/2017 0.2  0.2 - 1.0 EU/dL Final     Nitrite Urine 05/30/2017 Negative  NEG Final     Leukocyte Esterase Urine 05/30/2017 Trace* NEG Final     Source 05/30/2017 Midstream Urine   Final     Sodium 05/30/2017 137  133 - 144 mmol/L Final     Potassium 05/30/2017 3.9  3.4 - 5.3 mmol/L Final     Chloride 05/30/2017 102  94 - 109 mmol/L Final     Carbon Dioxide 05/30/2017 25  20 - 32 mmol/L Final     Anion Gap 05/30/2017 10  3 - 14 mmol/L Final     Glucose 05/30/2017 90  70 - 99 mg/dL Final     Urea Nitrogen 05/30/2017 21  7 - 30 mg/dL Final     Creatinine 05/30/2017 1.22  0.66 - 1.25 mg/dL Final     GFR Estimate 05/30/2017 57* >60 mL/min/1.7m2 Final     GFR Estimate If Black 05/30/2017 69  >60 mL/min/1.7m2 Final     Calcium 05/30/2017 9.1  8.5 - 10.1 mg/dL Final     WBC Urine 05/30/2017 O - 2  0 - 2 /HPF Final     RBC Urine 05/30/2017 O - 2  0 - 2 /HPF Final   Admission on 08/24/2016, Discharged on 08/24/2016   Component Date Value Ref Range Status     WBC 08/24/2016 17.1* 4.0 - 11.0 10e9/L Final     RBC Count 08/24/2016 4.67  4.4 - 5.9 10e12/L Final     Hemoglobin 08/24/2016 14.6  13.3 - 17.7 g/dL Final     Hematocrit 08/24/2016 43.0  40.0 - 53.0 % Final     MCV 08/24/2016 92  78 - 100 fl Final     MCH 08/24/2016 31.3  26.5 - 33.0 pg Final     MCHC 08/24/2016 34.0  31.5 - 36.5 g/dL Final     RDW 08/24/2016 13.3  10.0 - 15.0 % Final     Platelet Count 08/24/2016 229  150 - 450 10e9/L Final     Diff Method 08/24/2016 Automated Method   Final     % Neutrophils 08/24/2016 82.3  % Final     % Lymphocytes 08/24/2016 6.1  % Final     % Monocytes 08/24/2016 10.3  % Final     % Eosinophils 08/24/2016 0.5  % Final     % Basophils 08/24/2016 0.2  % Final     % Immature Granulocytes 08/24/2016 0.6  % Final     Nucleated RBCs 08/24/2016 0  0 /100 Final     Absolute  Neutrophil 08/24/2016 14.1* 1.6 - 8.3 10e9/L Final     Absolute Lymphocytes 08/24/2016 1.1  0.8 - 5.3 10e9/L Final     Absolute Monocytes 08/24/2016 1.8* 0.0 - 1.3 10e9/L Final     Absolute Eosinophils 08/24/2016 0.1  0.0 - 0.7 10e9/L Final     Absolute Basophils 08/24/2016 0.0  0.0 - 0.2 10e9/L Final     Abs Immature Granulocytes 08/24/2016 0.1  0 - 0.4 10e9/L Final     Absolute Nucleated RBC 08/24/2016 0.0   Final     Sodium 08/24/2016 139  133 - 144 mmol/L Final     Potassium 08/24/2016 3.7  3.4 - 5.3 mmol/L Final     Chloride 08/24/2016 103  94 - 109 mmol/L Final     Carbon Dioxide 08/24/2016 30  20 - 32 mmol/L Final     Anion Gap 08/24/2016 6  3 - 14 mmol/L Final     Glucose 08/24/2016 113* 70 - 99 mg/dL Final     Urea Nitrogen 08/24/2016 22  7 - 30 mg/dL Final     Creatinine 08/24/2016 1.23  0.66 - 1.25 mg/dL Final     GFR Estimate 08/24/2016 57* >60 mL/min/1.7m2 Final     GFR Estimate If Black 08/24/2016 69  >60 mL/min/1.7m2 Final     Calcium 08/24/2016 8.7  8.5 - 10.1 mg/dL Final   Admission on 08/24/2016, Discharged on 08/24/2016   Component Date Value Ref Range Status     Color Urine 08/24/2016 Yellow   Final     Appearance Urine 08/24/2016 Slightly Cloudy   Final     Glucose Urine 08/24/2016 Negative  NEG mg/dL Final     Bilirubin Urine 08/24/2016 Negative  NEG Final     Ketones Urine 08/24/2016 Negative  NEG mg/dL Final     Specific Gravity Urine 08/24/2016 1.015  1.003 - 1.035 Final     Blood Urine 08/24/2016 Large* NEG Final     pH Urine 08/24/2016 5.5  5.0 - 7.0 pH Final     Protein Albumin Urine 08/24/2016 30* NEG mg/dL Final     Urobilinogen mg/dL 08/24/2016 Normal  0.0 - 2.0 mg/dL Final     Nitrite Urine 08/24/2016 Positive* NEG Final     Leukocyte Esterase Urine 08/24/2016 Large* NEG Final     Source 08/24/2016 Catheterized Urine   Final     RBC Urine 08/24/2016 177* 0 - 2 /HPF Final     WBC Urine 08/24/2016 100* 0 - 2 /HPF Final     WBC Clumps 08/24/2016 Present* NEG /HPF Final     Specimen  Description 08/24/2016 Catheterized Urine   Final     Culture Micro 08/24/2016 >100,000 colonies/mL Klebsiella pneumoniae*  Final     Micro Report Status 08/24/2016 FINAL 08/26/2016   Final     Organism: 08/24/2016 >100,000 colonies/mL Klebsiella pneumoniae   Final     Imaging:  CT head 5/31/17, reviewed in PACS: head is tilted to left. Asymmetric anterior temporal horns of lateral ventrical, larger on right. Hippocampal formations appear about the same size except in the most anterior sections where the right hippocampus is slightly smaller. Cortical volume appears normal for age.     Impression:  Mr. Diaz is a 77 year old left-handed male who presents with visual hallucinations since spring 2017, initially in setting of UTI, but persisting since then during and between intercurrent illnesses. On exam he has brisker reflexes on the left and diminished sensation to cold and vibration in the left lower leg. Cognitive testing reveals MMSE 27/30 with notable difficulty coping ntersecting polygons. The head CT from May 2017 does not reveal a lesion or regional atrophy to account for the visual symptoms.  Labs are notable for positive amphetamines on tox screen. He denies recreational drugs. The differential dx for his symptoms includes amphetamine use, Malcom-Bonnet syndrome, and dementia with Lewy bodies. His history of visual illusions and hallucinations and exam features of difficulty drawing intersecting polygons suggest that DLB involving cortical structures without motor signs of Parkinson's disease, is possible. The symptoms persisting in daytime make Malcom-Bonnet syndrome less likely. The well formed hallucinsations are unlikely to be seizures. Further imaging is warranted to workup the hallucinations and asymmetric findings on exam.     Recommendations:  1. Brain MRI  2. After MRI completed, if no lesions discovered that would explain symptoms, and if chronic amphetamine use is ruled out, may start  donepezil trial to see if hallucinations improve. Would begin at 5 mg qam and increase to 10 mg a day after 6 weeks.   3. If chronic amphetamine use is ruled out, he should be seen in outpatient follow up with general neurology to assess for interval change. [facility] am [facility] patients in [facility], and would be happy to see him as well. We are currently booked out for 6 months.   4. Outpatient neuropsychological testing to better assess his visual and non-visual cognitive abilities. Would suggest that he see Dr. Kimani Abbott in the [facility].     Thank you for the opportunity to be involved in the care of this interesting patient.

## 2018-01-27 NOTE — ED PROVIDER NOTES
Per Intake's note from today in EPIC  Melecio, Mee HARTMAN Received call from Dr. Haile's office at [facility] regarding 78 yo male with hallucinations  B. Pt is experiencing visual hallucinations of bugs.  He lives in an assisted living facility and staff had to take a knife away from him because he was reportedly trying to cut his toe to get the bugs out.  OP psychiatrist has increased zyprexa recently with no effect.  Pt's daughter is concerned for pt's safety and will be bringing him to ED.  He was reportedly hospitalized at [facility] earlier this month.    A. Pt is being transported to [facility]. Mental health evaluation        Patient has a post-hospital follow-up visit from earlier this week (1/22/18) as noted in [facility]  The patient is here for posthospitalization follow up. He was admitted on 1/11/2018 and discharged on 1/15/2018. Primary diagnosis was acute cystitis with hematuria. Not the patient is status post transurethral resection of the prostate: 10/30/2017.  When he was seen here for posthospitalization follow up on 1/5/2018 she denied urinary symptoms. the daughter asked for UA just in case but since there were not urinary signs or symptoms as suggested that we hold off on doing a urinalysis just in case.  At that time the hallucinations were also improving. He did not have any constitutional symptoms.  During this current hospitalization he was seen by infectious diseases and urology.  Infectious diseases basically had the same approach that we had when she was seen on 1/5/2018 in the clinic that since he did not have urinary symptoms he could be observed off antibiotics. The urine culture has so far not grown any bacteria. It was felt by infectious disease specialist that the pyuria was most likely due to the recent transurethral resection of the prostate.  The patient is on chronic clindamycin: He is status post left total hip arthroplasty, 8/7/2017. This procedure was  complicated by prosthetic infection for which the patient required wound VAC in 6 weeks of clindamycin.      Patient was seen at [facility] in May 2017 for hallucinations . This was their impression according to [facility] notes  The patient is a 77-year-old male with a complicated medical history including chronic obstructive pulmonary disease, chronic kidney disease, coronary artery disease  status post PCI, severe left hip osteoarthritis, hypertension, BPH, obstructive sleep apnea, chronic lower extremity edema who presents to the Emergency Department with hallucinations.      1.  Hallucinations:  Suspect secondary to medication use.  Concurrent initiation of Bactrim and upgrading from Vicodin to oxycodone.  Bactrim already discontinued.  Continue with hallucinations.  The patient also failure to thrive.  Will bring in under observation overnight.  Hold these medications.  Have Psychiatry evaluate and see if there is any proactive medications to help with hallucinations.     History     Chief Complaint   Patient presents with     Hallucinations     for over 6 months and increasing in frequency, resides at assisted living, has been changed from seroquel to zyprexa, not sleeping at night, leaving apartment from hallucinations, reacting to hallucinations sometimes in high risk ways, terrified     The history is provided by the patient, medical records and a relative.     Deion Diaz is a 77 year old male who is referred here by his psychiatrist from [facility]  (Dr Haile) who felt patient would benefit from stabilization on the geropsych unit. Patient began having visual and tactile hallucinations in May of 2017. He was seen at [facility] and admitted to medical Obs. It was felt his hallucinations were drug induced, and at that time due to initiation of Bactrim to manage a UTI and being on narcotics. Patient was independent. He has since been placed in assisted living as he is having difficulty managing on his own.  His hallucinations persisted. He ended up getting hospitalized at [facility] in December 2017. He had complex medical problems, namely a UTI and again hallucinations were thought to be medically related. Seroquel was not helping and he was switched to Zyprexa. His psychiatrist directed him to [facility] 2 weeks ago as they could consult with psychiatry there. Patient again was being treated for acute cystitis and hallucinations. There were many specialties consulted. Patient also is supposed to have follow-up with neurology but will not be able to see one until February. In the meantime, his primary has seen him and again checked his labs early this week. Results of which showed WBC 13.2 with elevated neutrophils.  He had >100 WBCs in his urine and large leukocyte esterase prior to his hospitalization. He continues to be anemic.  He has had a head CT which did not show any abnormalities. He has not had a head MRI.    Patient is not presenting with SI. There is no ongoing psychosis, but persistent visual and tactile hallucinations. Family is worried as he got frustrated and tried to cut his toe to let the bugs out.    I have reviewed the Medications, Allergies, Past Medical and Surgical History, and Social History in the Epic system.    Review of Systems   Constitutional: Positive for activity change and appetite change. Negative for fever.   HENT: Negative.    Eyes: Negative.    Respiratory: Negative for cough, choking, wheezing and stridor.    Genitourinary: Positive for dysuria and frequency.   Musculoskeletal: Positive for arthralgias and gait problem.   Psychiatric/Behavioral: Positive for decreased concentration, hallucinations and sleep disturbance. Negative for suicidal ideas. The patient is nervous/anxious.        Physical Exam   BP: 111/48  Pulse: 86  Temp: 96.1  F (35.6  C)  Resp: 16  Weight: 73.9 kg (163 lb)  SpO2: 95 %      Physical Exam   Constitutional: He is oriented to person, place, and time. He appears  well-developed and well-nourished.   HENT:   Head: Normocephalic.   Eyes: Pupils are equal, round, and reactive to light.   Neck: Normal range of motion.   Cardiovascular: Normal rate and regular rhythm.    Pulmonary/Chest: Effort normal.   Abdominal: Soft.   Musculoskeletal: Normal range of motion.   Neurological: He is alert and oriented to person, place, and time.   Skin: Skin is warm.   Psychiatric: His speech is normal and behavior is normal. Judgment normal. His mood appears anxious. He is not agitated, not aggressive, not hyperactive and not combative. Thought content is not paranoid and not delusional. Cognition and memory are normal. He expresses no homicidal and no suicidal ideation.   Nursing note and vitals reviewed.      ED Course     ED Course     Procedures    Labs Ordered and Resulted from Time of ED Arrival Up to the Time of Departure from the ED - No data to display         Assessments & Plan (with Medical Decision Making)   Patient with persistent visual and tactile hallucinations. Patient needed medical clearance and I cannot rule out a medical reason for his hallucinations. I discussed the wide etiology from infection (UTI, other underlying infection?), to med side effects (antibiotics, allopurinol, lasix, tamsulosin), to delirium from his complex medical problems. Family and patient initially refused as they felt he has had a lot of work-up and he will be positive for a UTI and elevated WBC. I felt he will not be accepted to a psychiatric unit given his presentation appears more consistent with a medical etiology. They agreed to have further work-up. A CBC/CMP, UA with reflex micro and culture was started. Patient may need a chest x-ray and have his arthroplasty looked at. He ultimately may need a head MRI and EGG to determine any structural or electrical abnormalities to his brain. He would be best served on a medical unit with psychiatric consultation if needed. He was sent back to the ED for  further work-up.    I have reviewed the nursing notes.    I have reviewed the findings, diagnosis, plan and need for follow up with the patient.    New Prescriptions    No medications on file       Final diagnoses:   Hallucinations of tactile sensation   Visual hallucination       1/26/2018   [facility], [employer], EMERGENCY DEPARTMENT     Chris Diaz MD  01/27/18 0001

## 2018-01-28 ENCOUNTER — APPOINTMENT (OUTPATIENT)
Dept: OCCUPATIONAL THERAPY | Facility: CLINIC | Age: 78
DRG: 884 | End: 2018-01-28
Payer: MEDICARE

## 2018-01-28 LAB
ANION GAP SERPL CALCULATED.3IONS-SCNC: 7 MMOL/L (ref 3–14)
BACTERIA SPEC CULT: NORMAL
BASOPHILS # BLD AUTO: 0 10E9/L (ref 0–0.2)
BASOPHILS NFR BLD AUTO: 0.1 %
BUN SERPL-MCNC: 17 MG/DL (ref 7–30)
CALCIUM SERPL-MCNC: 8.5 MG/DL (ref 8.5–10.1)
CHLORIDE SERPL-SCNC: 114 MMOL/L (ref 94–109)
CO2 SERPL-SCNC: 25 MMOL/L (ref 20–32)
CREAT SERPL-MCNC: 0.87 MG/DL (ref 0.66–1.25)
DIFFERENTIAL METHOD BLD: ABNORMAL
EOSINOPHIL # BLD AUTO: 0.5 10E9/L (ref 0–0.7)
EOSINOPHIL NFR BLD AUTO: 6 %
ERYTHROCYTE [DISTWIDTH] IN BLOOD BY AUTOMATED COUNT: 16.3 % (ref 10–15)
GFR SERPL CREATININE-BSD FRML MDRD: 85 ML/MIN/1.7M2
GLUCOSE SERPL-MCNC: 89 MG/DL (ref 70–99)
HCT VFR BLD AUTO: 34.3 % (ref 40–53)
HGB BLD-MCNC: 10.3 G/DL (ref 13.3–17.7)
IMM GRANULOCYTES # BLD: 0 10E9/L (ref 0–0.4)
IMM GRANULOCYTES NFR BLD: 0.5 %
LYMPHOCYTES # BLD AUTO: 1.5 10E9/L (ref 0.8–5.3)
LYMPHOCYTES NFR BLD AUTO: 18.7 %
Lab: NORMAL
MCH RBC QN AUTO: 25.5 PG (ref 26.5–33)
MCHC RBC AUTO-ENTMCNC: 30 G/DL (ref 31.5–36.5)
MCV RBC AUTO: 85 FL (ref 78–100)
MONOCYTES # BLD AUTO: 0.9 10E9/L (ref 0–1.3)
MONOCYTES NFR BLD AUTO: 11.2 %
NEUTROPHILS # BLD AUTO: 5.1 10E9/L (ref 1.6–8.3)
NEUTROPHILS NFR BLD AUTO: 63.5 %
NRBC # BLD AUTO: 0 10*3/UL
NRBC BLD AUTO-RTO: 0 /100
PLATELET # BLD AUTO: 304 10E9/L (ref 150–450)
POTASSIUM SERPL-SCNC: 3.8 MMOL/L (ref 3.4–5.3)
RBC # BLD AUTO: 4.04 10E12/L (ref 4.4–5.9)
SODIUM SERPL-SCNC: 146 MMOL/L (ref 133–144)
SPECIMEN SOURCE: NORMAL
WBC # BLD AUTO: 8 10E9/L (ref 4–11)

## 2018-01-28 PROCEDURE — 84425 ASSAY OF VITAMIN B-1: CPT | Performed by: INTERNAL MEDICINE

## 2018-01-28 PROCEDURE — 25000128 H RX IP 250 OP 636: Performed by: INTERNAL MEDICINE

## 2018-01-28 PROCEDURE — 97535 SELF CARE MNGMENT TRAINING: CPT | Mod: GO

## 2018-01-28 PROCEDURE — 80048 BASIC METABOLIC PNL TOTAL CA: CPT | Performed by: INTERNAL MEDICINE

## 2018-01-28 PROCEDURE — 25000132 ZZH RX MED GY IP 250 OP 250 PS 637: Mod: GY | Performed by: INTERNAL MEDICINE

## 2018-01-28 PROCEDURE — 99207 ZZC CDG-MDM COMPONENT: MEETS MODERATE - UP CODED: CPT | Performed by: INTERNAL MEDICINE

## 2018-01-28 PROCEDURE — A9270 NON-COVERED ITEM OR SERVICE: HCPCS | Mod: GY | Performed by: INTERNAL MEDICINE

## 2018-01-28 PROCEDURE — 99233 SBSQ HOSP IP/OBS HIGH 50: CPT | Performed by: INTERNAL MEDICINE

## 2018-01-28 PROCEDURE — 12000001 ZZH R&B MED SURG/OB UMMC

## 2018-01-28 PROCEDURE — 36415 COLL VENOUS BLD VENIPUNCTURE: CPT | Performed by: INTERNAL MEDICINE

## 2018-01-28 PROCEDURE — 97530 THERAPEUTIC ACTIVITIES: CPT | Mod: GO

## 2018-01-28 PROCEDURE — 97165 OT EVAL LOW COMPLEX 30 MIN: CPT | Mod: GO

## 2018-01-28 PROCEDURE — 27210995 ZZH RX 272: Performed by: INTERNAL MEDICINE

## 2018-01-28 PROCEDURE — 85025 COMPLETE CBC W/AUTO DIFF WBC: CPT | Performed by: INTERNAL MEDICINE

## 2018-01-28 PROCEDURE — 40000133 ZZH STATISTIC OT WARD VISIT

## 2018-01-28 RX ORDER — ASPIRIN 81 MG/1
81 TABLET ORAL DAILY
Status: DISCONTINUED | OUTPATIENT
Start: 2018-01-28 | End: 2018-02-06 | Stop reason: HOSPADM

## 2018-01-28 RX ORDER — BUPROPION HYDROCHLORIDE 150 MG/1
150 TABLET, EXTENDED RELEASE ORAL DAILY
Status: COMPLETED | OUTPATIENT
Start: 2018-01-29 | End: 2018-02-02

## 2018-01-28 RX ADMIN — ATORVASTATIN CALCIUM 20 MG: 20 TABLET, FILM COATED ORAL at 19:46

## 2018-01-28 RX ADMIN — OLANZAPINE 5 MG: 5 TABLET, FILM COATED ORAL at 21:17

## 2018-01-28 RX ADMIN — FLUTICASONE PROPIONATE 2 SPRAY: 50 SPRAY, METERED NASAL at 08:42

## 2018-01-28 RX ADMIN — ALLOPURINOL 100 MG: 100 TABLET ORAL at 08:41

## 2018-01-28 RX ADMIN — MULTIPLE VITAMINS W/ MINERALS TAB 1 TABLET: TAB at 08:42

## 2018-01-28 RX ADMIN — TAMSULOSIN HYDROCHLORIDE 0.4 MG: 0.4 CAPSULE ORAL at 21:17

## 2018-01-28 RX ADMIN — CLINDAMYCIN HYDROCHLORIDE 300 MG: 300 CAPSULE ORAL at 19:46

## 2018-01-28 RX ADMIN — ASPIRIN 81 MG: 81 TABLET, COATED ORAL at 12:47

## 2018-01-28 RX ADMIN — BUPROPION HYDROCHLORIDE 150 MG: 150 TABLET, FILM COATED, EXTENDED RELEASE ORAL at 08:42

## 2018-01-28 RX ADMIN — CEFTRIAXONE SODIUM 1 G: 10 INJECTION, POWDER, FOR SOLUTION INTRAVENOUS at 09:23

## 2018-01-28 RX ADMIN — CLINDAMYCIN HYDROCHLORIDE 300 MG: 300 CAPSULE ORAL at 08:42

## 2018-01-28 RX ADMIN — FLUTICASONE FUROATE AND VILANTEROL TRIFENATATE 1 PUFF: 200; 25 POWDER RESPIRATORY (INHALATION) at 08:42

## 2018-01-28 NOTE — PROGRESS NOTES
RT was asked to see the pt for consult of CPAP. Pt uses home CPAP but was unable to have it brought in for him tonight. I brought up a hospital set up for him, sized the mask, and set up the parameters. Pt did not remember what pressure he was on at home so we went through various common pressure levels until he stated that he thought it felt  the closest to his home settings (+10). We will continue to follow for any other needs.

## 2018-01-28 NOTE — PLAN OF CARE
Problem: Patient Care Overview  Goal: Plan of Care/Patient Progress Review  Outcome: No Change  VS:     VSS   Output:     Voids in bathroom. BM 1/27 and passing gas   Activity:     CPAP on at night, pt requested it off at 0400   Skin: Scabs and redness on shins.   Pain:     Denies   CMS:     No numbness and tingling   Dressing:     Mepliex dressing on shins CDI   Diet:     Regular diet, tolerated well, no NV   LDA:     PIV in R arm SL   Equipment:     Walker, Bed alarm, CPAP machine   Plan:     Continue plan of care   Additional Info:     No hallucinations reported over night. Pt was pleasant throughout shift

## 2018-01-28 NOTE — PLAN OF CARE
Problem: Patient Care Overview  Goal: Plan of Care/Patient Progress Review  Discharge Planner OT   Patient plan for discharge: patient did not state, daughter would like patient to go to [facility] psych  Current status: Patient completed MOCA scoring 26/30 (score of 26 or greater is considered normal). Had mild difficulty with drawing time on clock (visuospatial/excutive), able to recall 4/5 words, and with abstraction (stating how watch and ruler are alike). Therapist went over results with patient and his daughter. Patient ambulated using 4ww (per his request, this is what he uses at [facility]). Patient ambulated with CGA-SBA, CGA at times as patient reported some pain in hip-in which he had replaced in Aug. Patient fatigues easily due to COPD, which is baseline for patient. Was having home PT come in several times per week. Per daughter, patient wears wraps on bilateral LE's for edema, she would like wraps back on as they are here and she has noticed some increased swelling. Relayed to PT and RN.   Barriers to return to prior living situation: hallucinations  Recommendations for discharge: dc per medical team, no skilled OT needs.   Rationale for recommendations: patient appears at baseline with mobility and ADLs (daughter in agreement). Daughter is concerned about continued hallucinations and would like patient to go to [facility] psych. She reports they have been in and out of ER's for past several months due to severity of hallucinations.     Occupational Therapy Discharge Summary    Reason for therapy discharge:    All goals and outcomes met, no further needs identified.    Progress towards therapy goal(s). See goals on Care Plan in [facility] electronic health record for goal details.  Goals met    Therapy recommendation(s):    No further therapy is recommended.           Entered by: KELI AMATO 01/28/2018 12:28 PM

## 2018-01-28 NOTE — CONSULTS
Consult Date:  01/27/2018      DATE OF SERVICE: 01/27/2018        REASON FOR CONSULTATION: The Medicine Service requested a consult to evaluate this patient for hallucinations.      HISTORY OF PRESENT ILLNESS:  The patient is a 77-year-old gentleman with a history of coronary artery disease, a distant history of alcohol dependence, hypertension, chronic kidney disease, COPD, obstructive sleep apnea who presents with persistent visual hallucinations over the past approximately a year.  The patient was admitted to [facility] on 01/11/2018 for the evaluation of hallucinations.  The day prior to coming in, he had called the police 5 times due to hallucinations.  He is followed by Psychiatry and has been on Seroquel and Zyprexa apparently without much effect on the hallucinations.  The patient's son-in-law and daughter brought him to the emergency room to be evaluated due to the increase in his  hallucinations and concern for his safety.  Initially there were thoughts of wanting to admit him to a geriatric psyche.  He currently lives alone, he is relatively independent.  He has done some things such as hold a  knife to his toes to clean the bugs off them; take a hammer to his scooter because one of his hallucinations is of a woman that has shrunk down inside the engine.  The  patient has had a number of medical issues, surgery and hospitalizations over the past year.      The patient was seen with his daughter.  He went on to describe hallucinations of animals and bugs.  There were descriptions then of people.  He has stated at one time he saw people on stilts.  He had the vision of a young woman nibbling on his toes.  Other hallucinatory experiences were the people that live at his extended care facility.  He went on to describe these but then began to express some delusional material, feeling that there was some type of cult involving the people in the extended care facility, at one point  "described ceremonies they have where they de-flower a young woman.  He stated that \"they like toes\".  He did appear to have difficulty with reality testing around this.  He felt that there certainly was a possibility of there being a cult with these various people from his extended care facility being involved in this.  Other hallucinatory experiences such as bugs or animals, he seemed less convinced that they were real, although by his behavior with a knife in his agitation as described, he certainly seems to have difficulty sorting out these hallucinatory experiences.      The patient denies depression, denies marked anxiety or being upset, except around these hallucinatory experiences.      PAST PSYCHIATRIC HISTORY:  The patient has no previous psychiatric history.      FAMILY PSYCHIATRIC HISTORY:  Negative.      CHEMICAL DEPENDENCY HISTORY:  The patient states that he drinks 1-2 drinks a day but has not drank in 12 days.  Per his daughter, he has a history of heavy alcohol use and she would describe him as an alcoholic, although he has cut down.  He also was a smoker and stopped smoking 7 to 8 years ago but stayed on Wellbutrin for unclear reasons after that time and currently is on 150 b.i.d.      PAST MEDICAL HISTORY:  Significant for chronic kidney disease, closed hip fracture, emphysema, coronary artery disease, obstructive sleep apnea.      SOCIAL HISTORY:  The patient is a former smoker of 1-1/2 packs a day for 40 years, quit in 2000, per report.  Does use alcohol as above.  He currently lives in an assisted care facility.      MEDICATIONS:  On admission were reviewed and include, gabapentin, Lasix, bupropion 150 mg b.i.d., hydrocodone, Advair, Ventolin, atorvastatin, allopurinol.      REVIEW OF SYSTEMS:  A 10-point review of systems was performed.  Denies chest pain, shortness of breath, GI symptoms, fevers, chills, nausea, vomiting or other localizing neurological symptoms.      VITAL SIGNS:  Blood " pressure 126/60, pulse 84, temperature 97.3, respirations 18.      MENTAL STATUS EXAMINATION:     GENERAL APPEARANCE AND BEHAVIOR:  The patient is an elderly gentleman sitting up eating his lunch.  He was generally cooperative with the exam but sometimes seemed to lose his interest in the process and would disengage.   MOOD AND AFFECT:  The patient denies depression.  Affect is mobile.   THOUGHT PROCESSES AND ASSOCIATIONS: Were within normal limits.   THOUGHT CONTENT:  Denies auditory or visual hallucinations at this time.  Denies suicidal ideation.   SPEECH AND LANGUAGE:  Within normal limits.     ATTENTION AND CONCENTRATION:  Appear generally intact.   ORIENTATION:  The patient is alert and oriented x3.     RECENT AND REMOTE MEMORY:  Appear generally intact, although he has had some difficulties with details at times.     FUND OF KNOWLEDGE:  Appears average.   JUDGMENT AND INSIGHT:  Appears adequate but limited in some ways in that he believes there are cults and things of this sort, which sound delusional.     MUSCLE BULK AND TONE:  Appears normal.     ABNORMAL MOVEMENTS: Not noted.      IMAGING:  CT scan from 05/17 was reviewed which was read as normal.        LABORATORY: Was reviewed.  Of note, his urine was positive for opiates and amphetamines.      IMPRESSION:  The patient is a 77-year-old gentleman with no previous psychiatric history who has an approximately 1-year history of visual hallucinations and some underlying delusional beliefs about these hallucinations.  He scored a 27/30 on the Mini-Mental Status Exam per the Neurology Service.  He did appear to be a somewhat vague historian at times to me and to lack attention to the process at times.  It is unclear what the etiology of this is.  Obviously there is concern here about organic hallucinosis and organic psychosis as well as underlying neurocognitive disorder such as Lewy body disease.  Also, the patient has a long history of drinking and coronary  artery disease.  It is unclear whether he has any microvascular disease that may be impacting his cognitive and psychiatric functioning.  Also he has been on Wellbutrin, which is a stimulant type drug and can also cause positive amphetamine screens.  He has been on antipsychotics, which seem to have been ineffective.      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      TREATMENT RECOMMENDATIONS:   1.  Would have the Occupational Therapy service come by and do a MOCA exam to check for frontal lobe functioning.  He does have a good MMSE, but if he does have more of a Lewy body presentation or some other underlying neurocognitive disorder, there may be frontal lobe systems involved.   2.  Agree with MRI as per Neurology.   3.  would consider further trials of atypical antipsychotics. He has been on Seroquel and Zyprexa in the past, although I do not know the dose.  It may be that he was insufficiently dosed. WOuld clarify this.  Consider trials of other agents.  4. Would taper and discontinue Wellbutrin.  Indications are unclear and can exacerbate psychotic symptoms.     5.  On the basis of MOCA, consider a Neuropsychology evaluation.     6.  The above was discussed with the patient's daughter and patient.   7.  Please call or reconsult with any questions, concerns or change in the patient's status.  My number is [phone].         ZULEYKA MAX MD             D: 2018   T: 2018   MT: MD      Name:     EMILIA ALMARAZ   MRN:      [id number]        Account:       [id number]   :      1940           Consult Date:  2018      Document: [id number]

## 2018-01-28 NOTE — PROGRESS NOTES
01/28/18 1100   Quick Adds   Type of Visit Initial Occupational Therapy Evaluation   Living Environment   Lives With alone   Living Arrangements assisted living   Home Accessibility no concerns   Transportation Available family or friend will provide   Self-Care   Dominant Hand left   Usual Activity Tolerance fair   Current Activity Tolerance fair   Regular Exercise (has home PT several times/week)   Equipment Currently Used at Home walker, rolling   Activity/Exercise/Self-Care Comment Patient had hip replaced in Aug, has home PT several times/week. Deconditioned, has COPD.   Functional Level Prior   Ambulation 1-->assistive equipment   Transferring 0-->independent   Toileting 0-->independent   Bathing 0-->independent   Dressing 0-->independent   Eating 0-->independent   Communication 0-->understands/communicates without difficulty   Swallowing 0-->swallows foods/liquids without difficulty   Cognition (hallucinations)   Fall history within last six months yes   Number of times patient has fallen within last six months 3   Prior Functional Level Comment PTA patient was walking short distances in apartment using 4ww. limited endurance due to COPD. IND with ADLs. Had assist with med management, meals, cleaning, laundry.    General Information   Onset of Illness/Injury or Date of Surgery - Date 01/27/18   Referring Physician Dr. Edmondson   Patient/Family Goals Statement Family would like patient to go to [facility] psych   Additional Occupational Profile Info/Pertinent History of Current Problem 78 yo M with hx of HTN, CAD, COPD,  Gout, BPH s/P TURP, S/P THOMAS admitted for  worsening recurrent vs chronic hallucinations x 1 year (since May 2017). Had L THOMAS in Aug.    Precautions/Limitations fall precautions   Weight-Bearing Status - LLE weight-bearing as tolerated   Weight-Bearing Status - RLE weight-bearing as tolerated   General Observations patient alert, oriented, sitting up in chair. daughter present.    Cognitive Status  Examination   Orientation orientation to person, place and time   Level of Consciousness alert   Able to Follow Commands WNL/WFL   Personal Safety (Cognitive) WNL/WFL   Memory intact   Cognitive Comment Alert and oriented. Completed MOCA with score of 26 which is normal. No hallucinations during session.    Visual Perception   Visual Perception Wears glasses   Visual Perception Comments Has floaters in L eye, needs cataract removed in R eye.    Sensory Examination   Sensory Quick Adds No deficits were identified   Pain Assessment   Patient Currently in Pain Yes, see Vital Sign flowsheet  (L hip)   Range of Motion (ROM)   ROM Comment B UE's WFL   Strength   Strength Comments UE strength WFL   Muscle Tone Assessment   Muscle Tone Quick Adds No deficits were identified   Coordination   Upper Extremity Coordination No deficits were identified   Transfer Skill: Sit to Stand   Level of [place]: Sit/Stand stand-by assist   Physical Assist/Nonphysical Assist: Sit/Stand set-up required   Transfer Skill: Sit to Stand weight-bearing as tolerated   Assistive Device for Transfer: Sit/Stand rolling walker   Balance   Balance Comments Patient ambulated using 4ww with CGA-SBA, CGA provided at times due to patient c/o pain in hip.    Activities of Daily Living Analysis   Impairments Contributing to Impaired Activities of Daily Living cognition impaired;strength decreased;pain  (hallucinations)   General Therapy Interventions   Planned Therapy Interventions cognition   Clinical Impression   Criteria for Skilled Therapeutic Interventions Met yes, treatment indicated   OT Diagnosis cognitive eval and education on results/recs   Influenced by the following impairments hallucinations   Therapy Frequency (one time eval and treat)   Predicted Duration of Therapy Intervention (days/wks) 1 day   Anticipated Discharge Disposition Other (see comments)  (per medical team, possibly [facility])   Risks and Benefits of Treatment have been  "explained. Yes   Patient, Family & other staff in agreement with plan of care Yes   [facility] AM-PAC TM \"6 Clicks\"   2016, Trustees of [facility], under license to [employer].  All rights reserved.   6 Clicks Short Forms Daily Activity Inpatient Short Form   [facility] AM-PAC  \"6 Clicks\" Daily Activity Inpatient Short Form   1. Putting on and taking off regular lower body clothing? 4 - None   2. Bathing (including washing, rinsing, drying)? 4 - None   3. Toileting, which includes using toilet, bedpan or urinal? 4 - None   4. Putting on and taking off regular upper body clothing? 4 - None   5. Taking care of personal grooming such as brushing teeth? 4 - None   6. Eating meals? 4 - None   Daily Activity Raw Score (Score out of 24.Lower scores equate to lower levels of function) 24   Total Evaluation Time   Total Evaluation Time (Minutes) 8     "

## 2018-01-28 NOTE — PLAN OF CARE
Problem: Patient Care Overview  Goal: Plan of Care/Patient Progress Review  Outcome: No Change    VS: Soft BP ,otherwise stable   O2: RA   Output: Adequate,unmeasured   Last BM: 1/28/18   Activity: Up to the BR using walker   Skin: scabs   Pain: denies   CMS: intact   Dressing: intact   Diet: regular   LDA: IV   Equipment: walker   Plan: MRI tomorrow   Additional Info: See pt with Dr. Edmondson this morning . A&O x 4.Pt still having visual hallucinations . Lasix not given due to soft BP,Dr. Edmondson aware. Voided 3 x unmeasured. Tolerated breakfast. Will continue monitor.

## 2018-01-28 NOTE — PLAN OF CARE
Problem: Infection, Risk/Actual (Adult)  Goal: Identify Related Risk Factors and Signs and Symptoms  Related risk factors and signs and symptoms are identified upon initiation of Human Response Clinical Practice Guideline (CPG).   Outcome: Improving    VS:     VSS   Output:     Voids using bathroom. BM today 1/27 and passing gas   Activity:     Up in chair, walk to bathroom, SBA with walker   Skin: Scabs and redness on shins.   Pain:     No pain expressed this shift   CMS:     No numbness and tingling   Dressing:     Mepliex dressing on shins   Diet:     Regular diet, tolerated well, no NV   LDA:     IV SL and patent   Equipment:     Walker, Bed alarm   Plan:     Continue plan of care   Additional Info:     Pt started having hallucinations at 2000. Young female and her 3 year old child.  Seemed happy they were there.

## 2018-01-28 NOTE — PROGRESS NOTES
"[facility], [place]   Internal Medicine Daily Note           Interval History/Events     Overnight events reviewed  Patient reports hallucinations now.   There is lady under the bed with kids.  He does not know her name, and does not know where she is from. He reports he has never seen the lady's face. She is of .   She bites her toes, and grabs his private parts. This is the same lady that has been bothering him.  He denied any nausea, vomiting, chest pain, abdominal pain  He reports having shortness of breath at baseline due to COPD         Review of Systems        4 point ROS including Respiratory, CV, GI and , other than that noted above is negative      Medications   I have reviewed current medications  in the \"current medication\" section of Epic.  Relevant changes include:     Physical Exam   General:       Vital signs:    Blood pressure 99/47, pulse 78, temperature 95.6  F (35.3  C), temperature source Axillary, resp. rate 16, weight 77 kg (169 lb 12.1 oz), SpO2 90 %.  Estimated body mass index is 28.25 kg/(m^2) as calculated from the following:    Height as of 5/31/17: 1.651 m (5' 5\").    Weight as of this encounter: 77 kg (169 lb 12.1 oz).      Intake/Output Summary (Last 24 hours) at 01/28/18 0949  Last data filed at 01/27/18 2100   Gross per 24 hour   Intake              800 ml   Output                0 ml   Net              800 ml      HEENT: No icterus, no pallor  Cardiovascular: S1, S2 normal  Respiratory:  B/L mild end expiratory wheeze.   GI/Abdomen: Soft, NT,BS+  Neurology: Alert, awake, and oriented to time, place and person. No tremors.   Extremities: Mild pre tibial edema.        Laboratory and Imaging Studies     I have reviewed  laboratory and imaging studies in the Epic. Pertinent findings are as below:    BMP  Recent Labs  Lab 01/28/18  0750 01/27/18  1827 01/27/18  0035   *  --  141   POTASSIUM 3.8 3.8 3.3*   CHLORIDE 114*  --  103   LOU 8.5  -- "  9.1   CO2 25  --  29   BUN 17  --  23   CR 0.87  --  1.11   GLC 89  --  100*     CBC  Recent Labs  Lab 01/28/18  0750 01/27/18  0035   WBC 8.0 13.4*   RBC 4.04* 4.31*   HGB 10.3* 11.4*   HCT 34.3* 36.6*   MCV 85 85   MCH 25.5* 26.5   MCHC 30.0* 31.1*   RDW 16.3* 16.3*    332     INRNo lab results found in last 7 days.  LFTs  Recent Labs  Lab 01/27/18  0035   ALKPHOS 171*   AST 33   ALT 27   BILITOTAL 0.4   PROTTOTAL 7.5   ALBUMIN 2.7*      PANCNo lab results found in last 7 days.        Impression/Plan        76 yo M with hx of HTN, CAD, COPD,  Gout, BPH s/P TURP, S/P THOMAS admitted for  worsening recurrent vs chronic hallucinations x 1 year (since May 2017)       # Hallucinations: Unkown etiology at this time. Differential broad  Drug scree positive for Amphetamine, and Opiates. Patient, and daughter not aware how he could have Amphetamine in urine. Patient strongly denies any use.   Patient reports regular use of Alcohol. CT head on 01/11 at OSH with no acute abnormality. TSH on 01/27 within normal limits. Vitamin B12, and Folic acid normal.   Treponema Pallidum Antibody negative. He was initiated on Ceftriaxone for possible UTI.   Psychiatry and Neurology has evaluated the patient  - MRI Brain pending. Discussed with MRI tech. Non emergent so will be done tomorrow (Monday)  - Continue Olanzapine  - Taper and discontinue Bupropion. Discussed with pharmacy. Reduce to 150 mg daily for 5 days, then DC.   - Consider further trials of atypical antipsychotics  - OT consult for MOCA   -  After MRI completed, if no lesions discovered that would explain symptoms, and if chronic amphetamine use is ruled out, may start donepezil trial to see if hallucinations improve. Would begin at 5 mg qam and increase to 10 mg a day after 6 weeks  - Outpatient neuropsychological testing to better assess his visual and non-visual cognitive abilities. Would suggest that he see Dr. 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 Urine culture without growth, and patient had no urinary symptoms  - DC Ceftriaxone  - Continue to monitor    # BPH s/p TURP on 10/30/2017: On Tamsulosin  - Continue   - Scheduled to follow up with urology on 3/9/2018    # Hx of COPD: PTA on Advair Diskus  - Continue LABA+ICS    # Hx of CAD (s/p coronary stent placement in 2003): On Atenolol. Not on Aspirin. Not sure why.   - Continue Atenolol  - Continue Aspirin    # Hx of SANTIAGO: Continue CPAP    # Hx of Hyperlipidemia: PTA Atorvastatin  - Continue    # PTA on Furosemide: No past hx of HF noted. Patient unsure of exact reason  - Will continue for now    # FEN: Regular diet  # Activity : Up with assist. PT consult  # Prophylaxis:  Ambulate with assist. Consider   # Social Issues:   # Code status: Full     Disposition Plan   Expected discharge in 2-3 days to prior living arrangement vs TCU vs LTC once diagnosis completed, and safety evaluation done.     Entered: Denilson Edmondson 01/28/2018, 10:21 AM          Pt's care was discussed with bedside RN, patient and  during Care Team Rounds.               Denilson Edmondson MD  Hospitalist ( Internal medicine)  Pager: [phone]

## 2018-01-29 ENCOUNTER — APPOINTMENT (OUTPATIENT)
Dept: MRI IMAGING | Facility: CLINIC | Age: 78
DRG: 884 | End: 2018-01-29
Attending: INTERNAL MEDICINE
Payer: MEDICARE

## 2018-01-29 ENCOUNTER — APPOINTMENT (OUTPATIENT)
Dept: PHYSICAL THERAPY | Facility: CLINIC | Age: 78
DRG: 884 | End: 2018-01-29
Attending: INTERNAL MEDICINE
Payer: MEDICARE

## 2018-01-29 LAB
ANION GAP SERPL CALCULATED.3IONS-SCNC: 7 MMOL/L (ref 3–14)
BUN SERPL-MCNC: 12 MG/DL (ref 7–30)
CALCIUM SERPL-MCNC: 8.6 MG/DL (ref 8.5–10.1)
CHLORIDE SERPL-SCNC: 111 MMOL/L (ref 94–109)
CO2 SERPL-SCNC: 27 MMOL/L (ref 20–32)
CREAT SERPL-MCNC: 0.9 MG/DL (ref 0.66–1.25)
ERYTHROCYTE [DISTWIDTH] IN BLOOD BY AUTOMATED COUNT: 16.3 % (ref 10–15)
GFR SERPL CREATININE-BSD FRML MDRD: 82 ML/MIN/1.7M2
GLUCOSE SERPL-MCNC: 86 MG/DL (ref 70–99)
HCT VFR BLD AUTO: 36.9 % (ref 40–53)
HGB BLD-MCNC: 11.3 G/DL (ref 13.3–17.7)
MCH RBC QN AUTO: 25.9 PG (ref 26.5–33)
MCHC RBC AUTO-ENTMCNC: 30.6 G/DL (ref 31.5–36.5)
MCV RBC AUTO: 85 FL (ref 78–100)
PLATELET # BLD AUTO: 344 10E9/L (ref 150–450)
POTASSIUM SERPL-SCNC: 3.8 MMOL/L (ref 3.4–5.3)
RBC # BLD AUTO: 4.36 10E12/L (ref 4.4–5.9)
SODIUM SERPL-SCNC: 145 MMOL/L (ref 133–144)
WBC # BLD AUTO: 8 10E9/L (ref 4–11)

## 2018-01-29 PROCEDURE — 97530 THERAPEUTIC ACTIVITIES: CPT | Mod: GP

## 2018-01-29 PROCEDURE — 99233 SBSQ HOSP IP/OBS HIGH 50: CPT | Performed by: INTERNAL MEDICINE

## 2018-01-29 PROCEDURE — A9270 NON-COVERED ITEM OR SERVICE: HCPCS | Mod: GY | Performed by: INTERNAL MEDICINE

## 2018-01-29 PROCEDURE — 36415 COLL VENOUS BLD VENIPUNCTURE: CPT | Performed by: INTERNAL MEDICINE

## 2018-01-29 PROCEDURE — 70551 MRI BRAIN STEM W/O DYE: CPT

## 2018-01-29 PROCEDURE — 97162 PT EVAL MOD COMPLEX 30 MIN: CPT | Mod: GP

## 2018-01-29 PROCEDURE — 80048 BASIC METABOLIC PNL TOTAL CA: CPT | Performed by: INTERNAL MEDICINE

## 2018-01-29 PROCEDURE — 85027 COMPLETE CBC AUTOMATED: CPT | Performed by: INTERNAL MEDICINE

## 2018-01-29 PROCEDURE — 40000193 ZZH STATISTIC PT WARD VISIT

## 2018-01-29 PROCEDURE — 25000132 ZZH RX MED GY IP 250 OP 250 PS 637: Mod: GY | Performed by: INTERNAL MEDICINE

## 2018-01-29 PROCEDURE — 99207 ZZC CDG-MDM COMPONENT: MEETS MODERATE - UP CODED: CPT | Performed by: INTERNAL MEDICINE

## 2018-01-29 PROCEDURE — 97110 THERAPEUTIC EXERCISES: CPT | Mod: GP

## 2018-01-29 PROCEDURE — 99233 SBSQ HOSP IP/OBS HIGH 50: CPT | Performed by: PSYCHIATRY & NEUROLOGY

## 2018-01-29 PROCEDURE — 97116 GAIT TRAINING THERAPY: CPT | Mod: GP

## 2018-01-29 PROCEDURE — 12000001 ZZH R&B MED SURG/OB UMMC

## 2018-01-29 RX ORDER — OLANZAPINE 7.5 MG/1
7.5 TABLET, FILM COATED ORAL AT BEDTIME
Status: DISCONTINUED | OUTPATIENT
Start: 2018-01-29 | End: 2018-01-30

## 2018-01-29 RX ADMIN — ALLOPURINOL 100 MG: 100 TABLET ORAL at 08:08

## 2018-01-29 RX ADMIN — ATENOLOL 50 MG: 50 TABLET ORAL at 08:05

## 2018-01-29 RX ADMIN — ALBUTEROL SULFATE 2 PUFF: 90 AEROSOL, METERED RESPIRATORY (INHALATION) at 20:31

## 2018-01-29 RX ADMIN — TAMSULOSIN HYDROCHLORIDE 0.4 MG: 0.4 CAPSULE ORAL at 23:03

## 2018-01-29 RX ADMIN — ATORVASTATIN CALCIUM 20 MG: 20 TABLET, FILM COATED ORAL at 20:27

## 2018-01-29 RX ADMIN — FLUTICASONE FUROATE AND VILANTEROL TRIFENATATE 1 PUFF: 200; 25 POWDER RESPIRATORY (INHALATION) at 08:09

## 2018-01-29 RX ADMIN — BUPROPION HYDROCHLORIDE 150 MG: 150 TABLET, FILM COATED, EXTENDED RELEASE ORAL at 08:08

## 2018-01-29 RX ADMIN — FLUTICASONE PROPIONATE 2 SPRAY: 50 SPRAY, METERED NASAL at 08:09

## 2018-01-29 RX ADMIN — CLINDAMYCIN HYDROCHLORIDE 300 MG: 300 CAPSULE ORAL at 20:27

## 2018-01-29 RX ADMIN — OLANZAPINE 7.5 MG: 7.5 TABLET, FILM COATED ORAL at 23:03

## 2018-01-29 RX ADMIN — FUROSEMIDE 20 MG: 20 TABLET ORAL at 08:05

## 2018-01-29 RX ADMIN — ASPIRIN 81 MG: 81 TABLET, COATED ORAL at 08:08

## 2018-01-29 RX ADMIN — MULTIPLE VITAMINS W/ MINERALS TAB 1 TABLET: TAB at 08:05

## 2018-01-29 RX ADMIN — CLINDAMYCIN HYDROCHLORIDE 300 MG: 300 CAPSULE ORAL at 08:08

## 2018-01-29 NOTE — PLAN OF CARE
Problem: Sensory/Perceptual Alteration (Adult)  Goal: Identify Related Risk Factors and Signs and Symptoms  Related risk factors and signs and symptoms are identified upon initiation of Human Response Clinical Practice Guideline (CPG).   Outcome: No Change  VS: VSS   O2: Sats >90 on RA   Output: Urine output adequate   Last BM: 1/28   Activity: Up with SBA and walker   Skin: Scabs on legs   Pain: Denies any pain   CMS: Denies any numbness or tingling   Dressing: CDI   Diet: Regular diet, well tolerated. No N/V   LDA: PIV right arm, SL. Bruising around site   Equipment: Walker, bed alarm, CPAP   Plan: Continue to monitor    Additional Info: Pt continues to have auditory, visual, and tactile hallucinations. Pt states he does not feel threatened by the people he sees or physically scared but mentally. Pt has not sleep since yesterday and was awake throughout the night due tactile hallucinations.  Pt is aware that he is having hallucinations but sometimes cannot distinguish between reality and hallucinations. Bed alarm was set off multiple times this morning and VPM was initiated.

## 2018-01-29 NOTE — PLAN OF CARE
"Problem: Infection, Risk/Actual (Adult)  Goal: Identify Related Risk Factors and Signs and Symptoms  Related risk factors and signs and symptoms are identified upon initiation of Human Response Clinical Practice Guideline (CPG).   Outcome: No Change    VS: VSS   O2: Sats >90 on RA   Output: Urine output adequate   Last BM: 1/28   Activity: Up with SBA and walker   Skin: Scabs on legs   Pain: Denies any pain   CMS: Denies any numbness or tingling   Dressing: CDI   Diet: Regular diet, well tolerated. No N/V   LDA: PIV right arm, SL. Bruising around site   Equipment: Walker, bed alarm, CPAP   Plan: Continue to monitor    Additional Info: Pt continues to have auditory, visual, and tactile hallucinations. Pt states he does not feel threatened by the people he sees or physically scared but mentally. Pt appears exhausted and states he is tired but is \"waiting for the lady to leave my room so I can go to bed\". Pt is aware that he is having hallucinations but sometimes cannot distinguish between reality and hallucinations. Pt called staff into room to \"scare away the lady\" so he could go to bed.             "

## 2018-01-29 NOTE — PLAN OF CARE
Problem: Patient Care Overview  Goal: Plan of Care/Patient Progress Review  Discharge Planner PT   Patient plan for discharge: Pt prefers to go home with con't HHPT and PCA 2x/week but open to TCU  Current status: Pt demonstrates poor balance in standing and walking. Pt requires BUE support at all times when up moving around. Pt amb 80 ftx2 with FWW and CGA, requiring seated rest break after 80 ft. Pt transfers sit<>stand with SBA and FWW. Pt reports to feel very fatigued and weak over the past few weeks  Barriers to return to prior living situation: high risk for falls, hallucinations, deconditioned  Recommendations for discharge: TCU  Rationale for recommendations: Pt would benefit from TCU stay to improve balance and strength with functional mobility as pt is at a high risk for falls.       Entered by: Laura Donaldson 01/29/2018 11:08 AM

## 2018-01-29 NOTE — PROGRESS NOTES
" 01/29/18 1042   Quick Adds   Type of Visit Initial PT Evaluation   Living Environment   Lives With alone   Living Arrangements assisted living   Home Accessibility no concerns   Number of Stairs to Enter Home 0   Number of Stairs Within Home 0   Transportation Available family or friend will provide   Living Environment Comment Pt lives at [facility] gets 1 meal a day. lives in apartment IND with PCA coming in 2x/week to do cleaning/laundry   Self-Care   Dominant Hand left   Usual Activity Tolerance fair   Current Activity Tolerance fair   Regular Exercise yes   Activity/Exercise Type other (see comments)  (physical therapy)   Exercise Amount/Frequency 3-5 times/wk   Equipment Currently Used at Home walker, rolling   Activity/Exercise/Self-Care Comment Pt has PT/OT come to home 2-3x/week. Pt reports to be IND  with bathing and dresssing and makes brekfast/lunch for self everyday.   Functional Level Prior   Ambulation 1-->assistive equipment   Transferring 1-->assistive equipment   Toileting 0-->independent   Bathing 3-->assistive equipment and person   Dressing 0-->independent   Eating 0-->independent   Communication 0-->understands/communicates without difficulty   Swallowing 0-->swallows foods/liquids without difficulty   Cognition 0 - no cognition issues reported   Fall history within last six months yes   Number of times patient has fallen within last six months 3   Which of the above functional risks had a recent onset or change? ambulation;transferring   Prior Functional Level Comment Pt reports 2-3 recent falls at home \"i feel like my equilibrium is off\" pt hit head on last fall and reports to still have some headaches. Pt uses FWW at all times when amb in/out of apartment. Pt reports uses motorized scooter for longer distance d/t fatugue and SOB   General Information   Onset of Illness/Injury or Date of Surgery - Date 01/26/18   Referring Physician Denilson Edmondson MD   Pertinent History of Current Problem " (include personal factors and/or comorbidities that impact the POC) 78 yo M with hx of HTN, CAD, COPD,  Gout, BPH s/P TURP, S/P THOMAS admitted for  worsening recurrent vs chronic hallucinations x 1 year (since May 2017). Had L THOMAS in Aug.    Precautions/Limitations fall precautions   Weight-Bearing Status - LUE full weight-bearing   Weight-Bearing Status - RUE full weight-bearing   Weight-Bearing Status - LLE full weight-bearing   Weight-Bearing Status - RLE full weight-bearing   General Info Comments Pt reports L hip (same hip as THOMAS) pain for the past 2 weeks   Cognitive Status Examination   Orientation orientation to person, place and time   Level of Consciousness alert   Follows Commands and Answers Questions 100% of the time   Personal Safety and Judgment at risk behaviors demonstrated   Memory intact   Cognitive Comment Pt has active hallucinations but very aware that they are hallucinations   Pain Assessment   Patient Currently in Pain Yes, see Vital Sign flowsheet  (L hip pain)   Integumentary/Edema   Integumentary/Edema no deficits were identifed   Posture    Posture Forward head position;Protracted shoulders;Kyphosis   Posture Comments very flexed forward posture with standing   Range of Motion (ROM)   ROM Comment not formally measured. WFL   Strength   Strength Comments not formally tested. Pt demonstrates 4-/5 strength in BLEs with mobility and exericses   Bed Mobility   Bed Mobility Comments SBA   Transfer Skills   Transfer Comments Pt transfers sit<>stand with SBA /FWW   Gait   Gait Comments Pt amb 80 ftx2 with CGA and FWW- slow, poor stability   Balance   Balance Comments normal sitting balance. Pt demonstartes poor static standing/ dynamic balance. Requies BUE support in order to stand safely   Sensory Examination   Sensory Perception no deficits were identified   General Therapy Interventions   Planned Therapy Interventions balance training;bed mobility training;gait training;strengthening;progressive  "activity/exercise;home program guidelines;risk factor education;transfer training;neuromuscular re-education   Clinical Impression   Criteria for Skilled Therapeutic Intervention yes, treatment indicated   PT Diagnosis Impaired functional mobility   Influenced by the following impairments impaired balance, BLE weakness, L hip pain, deconditioned   Functional limitations due to impairments transfers, gait   Clinical Presentation Evolving/Changing   Clinical Presentation Rationale Per pts PMHx and current medical and functional status   Clinical Decision Making (Complexity) Moderate complexity   Therapy Frequency` 5 times/week   Predicted Duration of Therapy Intervention (days/wks) 1 week   Anticipated Discharge Disposition Transitional Care Facility;Home with Assist;Home with Home Therapy   Risk & Benefits of therapy have been explained Yes   Patient, Family & other staff in agreement with plan of care Yes   [facility] TM \"6 Clicks\"   2016, Trustees of [facility], under license to [employer].  All rights reserved.   6 Clicks Short Forms Basic Mobility Inpatient Short Form   [facility]  \"6 Clicks\" V.2 Basic Mobility Inpatient Short Form   1. Turning from your back to your side while in a flat bed without using bedrails? 4 - None   2. Moving from lying on your back to sitting on the side of a flat bed without using bedrails? 4 - None   3. Moving to and from a bed to a chair (including a wheelchair)? 3 - A Little   4. Standing up from a chair using your arms (e.g., wheelchair, or bedside chair)? 3 - A Little   5. To walk in hospital room? 3 - A Little   6. Climbing 3-5 steps with a railing? 2 - A Lot   Basic Mobility Raw Score (Score out of 24.Lower scores equate to lower levels of function) 19   Total Evaluation Time   Total Evaluation Time (Minutes) 15     "

## 2018-01-29 NOTE — CONSULTS
[facility], [place], [place]   Psychiatric Consult Follow-up  Hospital Day: 2        Interim History:   Psychiatry was asked to follow-up with the patient today. I reviewed the medical record of this admission and met with the patient.    Patient reports that he feels the same since his arrival to the hospital.  He was able to describe in detail his various hallucinations.  Indicates that his hallucinations are visual about 99% of the time.  He occasionally will have tactile and auditory hallucinations.  He claims that he tries to talk to his visual hallucinations but they usually do not act indicates that he is aware that his hallucinations are not indeed not real however he still is obligated to help him in some way.  He has a somewhat illogical comparison to the need to treat all people the same.  During the 2 he indicates that 1 of his hallucinations started biting his toes; at that point he indicates that he believes this is some sort of foreplay and that he will often tell the hallucinations that he is 79 or 24 they should go find someone else.  Otherwise during the interview the patient is oriented. He is a very strong recollection of past events.  He will make corrections to the smallest details when I ask him questions about information I have read in the chart.    Asked patient about his recent vision.  He indicates that when the hallucinations first started he went to see an ophthalmologist discovered he had floaters in his left eye and needed cataract surgery in his right.  Patient also indicates that the hallucinations are worse at night however they are present all the time.  He states that last neither particularly bad as they continued to bite at his feet and pull at his joints.    He denies any side effects related to his olanzapine 5 mg.  He would be agreeable to increasing this.         Medications:       buPROPion  150 mg Oral Daily     aspirin EC  81 mg Oral Daily      atenolol (TENORMIN) tablet 50 mg  50 mg Oral Daily     allopurinol (ZYLOPRIM) tablet 100 mg  100 mg Oral Daily     atorvastatin (LIPITOR) tablet 20 mg  20 mg Oral Daily at 8 pm     fluticasone-vilanterol  1 puff Inhalation Daily     furosemide  20 mg Oral Daily     multivitamin, therapeutic with minerals  1 tablet Oral Daily     tamsulosin (FLOMAX) capsule 0.4 mg  0.4 mg Oral At Bedtime     clindamycin  300 mg Oral BID     fluticasone  2 spray Both Nostrils Daily     OLANZapine  5 mg Oral At Bedtime     sodium chloride (PF)  3 mL Intracatheter Q8H          Allergies:   No Known Allergies       Labs:     Recent Results (from the past 24 hour(s))   CBC with platelets    Collection Time: 01/29/18  7:55 AM   Result Value Ref Range    WBC 8.0 4.0 - 11.0 10e9/L    RBC Count 4.36 (L) 4.4 - 5.9 10e12/L    Hemoglobin 11.3 (L) 13.3 - 17.7 g/dL    Hematocrit 36.9 (L) 40.0 - 53.0 %    MCV 85 78 - 100 fl    MCH 25.9 (L) 26.5 - 33.0 pg    MCHC 30.6 (L) 31.5 - 36.5 g/dL    RDW 16.3 (H) 10.0 - 15.0 %    Platelet Count 344 150 - 450 10e9/L   Basic metabolic panel    Collection Time: 01/29/18  7:55 AM   Result Value Ref Range    Sodium 145 (H) 133 - 144 mmol/L    Potassium 3.8 3.4 - 5.3 mmol/L    Chloride 111 (H) 94 - 109 mmol/L    Carbon Dioxide 27 20 - 32 mmol/L    Anion Gap 7 3 - 14 mmol/L    Glucose 86 70 - 99 mg/dL    Urea Nitrogen 12 7 - 30 mg/dL    Creatinine 0.90 0.66 - 1.25 mg/dL    GFR Estimate 82 >60 mL/min/1.7m2    GFR Estimate If Black >90 >60 mL/min/1.7m2    Calcium 8.6 8.5 - 10.1 mg/dL          Psychiatric Examination:     /51 (BP Location: Left arm)  Pulse 91  Temp 95.8  F (35.4  C) (Oral)  Resp 18  Wt 76.3 kg (168 lb 4.8 oz)  SpO2 94%  BMI 28.01 kg/m2  Weight is 168 lbs 4.8 oz  Body mass index is 28.01 kg/(m^2).    Appearance: awake, alert, adequately groomed and casually dressed  Attitude:  cooperative  Eye Contact:  good  Mood:  good  Affect:  appropriate and in normal range and mood congruent  Speech:   clear, coherent and normal prosody  Language: fluent and intact in English  Psychomotor, Gait, Musculoskeletal:  no evidence of tardive dyskinesia, dystonia, or tics  Throught Process:  some illogical reasoning for responding to his hallucinations  Associations:  no loose associations  Thought Content:  Visual hallucinations to which he has a surprising amount of insight  Insight:  good  Judgement:  need to respond to his hallucinations despite knowing they are not real displays poor judgement  Oriented to:  time, person, and place  Attention Span and Concentration:  intact  Recent and Remote Memory:  intact  Fund of Knowledge:  appropriate         Precautions:     Behavioral Orders   Procedures     Fall precautions          Diagnoses:      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 Luis Doyle.  At that time Dr. Doyle had made recommendations to trial olanzapine and tapered bupropion.  At this point in time bupropion is in a taper as recommended by pharmacy.  I do not see any significant change in the patient's behavior.  I agree with Dr. Doyle that the presentation is most consistent with an organic cause to his hallucinations.  As indicated by neurology and MRI will be helpful in ascertaining the exact nature of the possible organic cause.  Differential is broad and further studies will need to be obtained.  Most likely the lab result was a false positive for amphetamines due to his bupropion usage.    Recommendations:  1.  Primary team may consider increasing olanzapine as tolerated by patient consider 7.5 mg nightly.  Given his fall risk however this will need to be balanced for possible orthostasis.  2.  Continue with bupropion taper is recommended initially by Dr. Doyle.  3.  Continue with neurology recommendations.  4.  At this time there is no indication that this is a primary  psychiatric disorder that would indicate need for treatment on the psychiatric unit.    Please contact psychiatry with any further questions related to this case.

## 2018-01-29 NOTE — PLAN OF CARE
Problem: Patient Care Overview  Goal: Plan of Care/Patient Progress Review  VS: Pt A&Ox 4. VSS on room air. Incentive spirometer used while awake. CPAP off, patient refused.    Output: Voids spontaneously without difficulty in the toilet. Pt LBM 1/28 and is passing gas   Activity: Pt up with standby assist and walker.    Skin: Intact. Scabs.    Pain: Denies.   CMS: CMS and Neuro's are intact. Patient denies numbness and tingling in all extremities. PCD's off BLE due to hallucinations of a woman grabbing at his legs.    Dressing: None.    Diet:  Is on regular diet. Denies nausea. Tolerating well.     LDA:   None.   Sleep: Awake majority of the night due to man standing in door way and woman grabbing at his legs and sitting on his back. Patient sat in chair in hallway with back of chair to the wall, he felt safer this way.     Plan: Continue to monitor pt and provide reassurance. MRI today 1/29.

## 2018-01-29 NOTE — PROGRESS NOTES
"[facility], [place]   Internal Medicine Daily Note           Interval History/Events     Overnight events reviewed  Patient reports hallucinations. On questions details unchanged from yesterday  No fever, chills, nausea, vomiting, lightheadedness or dizziness.        Review of Systems        4 point ROS including Respiratory, CV, GI and , other than that noted above is negative      Medications   I have reviewed current medications  in the \"current medication\" section of Epic.  Relevant changes include:     Physical Exam   General:       Vital signs:    Blood pressure 128/51, pulse 91, temperature 95.8  F (35.4  C), temperature source Oral, resp. rate 18, weight 76.3 kg (168 lb 4.8 oz), SpO2 94 %.  Estimated body mass index is 28.01 kg/(m^2) as calculated from the following:    Height as of 5/31/17: 1.651 m (5' 5\").    Weight as of this encounter: 76.3 kg (168 lb 4.8 oz).      Intake/Output Summary (Last 24 hours) at 01/28/18 0949  Last data filed at 01/27/18 2100   Gross per 24 hour   Intake              800 ml   Output                0 ml   Net              800 ml      HEENT: No icterus, no pallor  Cardiovascular: S1, S2 normal  Respiratory:  B/L mild end expiratory wheeze.   GI/Abdomen: Soft, NT,BS+  Neurology: Alert, awake, and oriented to time, place and person. No tremors.   Extremities: Mild pre tibial edema.        Laboratory and Imaging Studies     I have reviewed  laboratory and imaging studies in the Epic. Pertinent findings are as below:    BMP    Recent Labs  Lab 01/29/18  0755 01/28/18  0750 01/27/18  1827 01/27/18  0035   * 146*  --  141   POTASSIUM 3.8 3.8 3.8 3.3*   CHLORIDE 111* 114*  --  103   LOU 8.6 8.5  --  9.1   CO2 27 25  --  29   BUN 12 17  --  23   CR 0.90 0.87  --  1.11   GLC 86 89  --  100*     CBC    Recent Labs  Lab 01/29/18  0755 01/28/18  0750 01/27/18  0035   WBC 8.0 8.0 13.4*   RBC 4.36* 4.04* 4.31*   HGB 11.3* 10.3* 11.4*   HCT 36.9* 34.3* 36.6* "   MCV 85 85 85   MCH 25.9* 25.5* 26.5   MCHC 30.6* 30.0* 31.1*   RDW 16.3* 16.3* 16.3*    304 332     INRNo lab results found in last 7 days.  LFTs    Recent Labs  Lab 01/27/18  0035   ALKPHOS 171*   AST 33   ALT 27   BILITOTAL 0.4   PROTTOTAL 7.5   ALBUMIN 2.7*      PANCNo lab results found in last 7 days.        Impression/Plan      76 yo M with hx of HTN, CAD, COPD,  Gout, BPH s/P TURP, S/P THOMAS admitted for  worsening recurrent vs chronic hallucinations x 1 year (since May 2017)       # Hallucinations: Unkown etiology at this time. Differential broad  Drug screen positive for Amphetamine, and Opiates. Patient, and daughter not aware how he could have Amphetamine in urine. Patient strongly denies any use.   Patient reports regular use of Alcohol. CT head on 01/11 at OSH with no acute abnormality. TSH on 01/27 within normal limits. Vitamin B12, and Folic acid normal.   Treponema Pallidum Antibody negative. He was initiated on Ceftriaxone for possible UTI.   Psychiatry and Neurology has evaluated the patient  - MRI Brain today  - Continue Olanzapine  - Taper and discontinue Bupropion. Discussed with pharmacy. Reduce to 150 mg daily for 5 days, then DC.   - Consider further trials of atypical antipsychotics  -  After MRI completed, if no lesions discovered that would explain symptoms, and if chronic amphetamine use is ruled out, may start donepezil trial to see if hallucinations improve. Would begin at 5 mg qam and increase to 10 mg a day after 6 weeks  - Outpatient neuropsychological testing to better assess his visual and non-visual cognitive abilities. Would suggest that he see Dr. Kimani Abbott in the [facility]  - Psych reconsulted today.     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 Urine culture without growth, and  patient had no urinary symptoms thus Ceftriaxone was discontinued.   - Continue to monitor    # BPH s/p TURP on 10/30/2017: On Tamsulosin  - Continue   - Scheduled to follow up with urology on 3/9/2018    # Hx of COPD: PTA on Advair Diskus  - Continue LABA+ICS    # Hx of CAD (s/p coronary stent placement in 2003): On Atenolol. Not on Aspirin. Not sure why.   - Continue Atenolol  - Continue Aspirin    # Hx of SANTIAGO: Continue CPAP    # Hx of Hyperlipidemia: PTA Atorvastatin  - Continue    # PTA on Furosemide: No past hx of HF noted. Patient unsure of exact reason  - Will continue for now    # FEN: Regular diet  # Activity : Up with assist. PT consult  # Prophylaxis:  Ambulate with assist. Consider   # Social Issues:   # Code status: Full     Disposition Plan   Expected discharge in 2-3 days to prior living arrangement vs TCU vs LTC vs Geriatric psych once diagnosis completed, and safety evaluation done.     Entered: Denilson Edmondson 01/29/2018, 10:25 AM          Pt's care was discussed with bedside RN, patient and  during Care Team Rounds.               Denilson Edmondson MD  Hospitalist ( Internal medicine)  Pager: [phone]

## 2018-01-30 ENCOUNTER — APPOINTMENT (OUTPATIENT)
Dept: PHYSICAL THERAPY | Facility: CLINIC | Age: 78
DRG: 884 | End: 2018-01-30
Payer: MEDICARE

## 2018-01-30 LAB
ANION GAP SERPL CALCULATED.3IONS-SCNC: 3 MMOL/L (ref 3–14)
BUN SERPL-MCNC: 10 MG/DL (ref 7–30)
CALCIUM SERPL-MCNC: 8.5 MG/DL (ref 8.5–10.1)
CHLORIDE SERPL-SCNC: 110 MMOL/L (ref 94–109)
CO2 SERPL-SCNC: 31 MMOL/L (ref 20–32)
CREAT SERPL-MCNC: 0.93 MG/DL (ref 0.66–1.25)
GFR SERPL CREATININE-BSD FRML MDRD: 78 ML/MIN/1.7M2
GLUCOSE SERPL-MCNC: 89 MG/DL (ref 70–99)
POTASSIUM SERPL-SCNC: 3.7 MMOL/L (ref 3.4–5.3)
SODIUM SERPL-SCNC: 144 MMOL/L (ref 133–144)

## 2018-01-30 PROCEDURE — 97110 THERAPEUTIC EXERCISES: CPT | Mod: GP

## 2018-01-30 PROCEDURE — 80048 BASIC METABOLIC PNL TOTAL CA: CPT | Performed by: INTERNAL MEDICINE

## 2018-01-30 PROCEDURE — 25000132 ZZH RX MED GY IP 250 OP 250 PS 637: Mod: GY | Performed by: INTERNAL MEDICINE

## 2018-01-30 PROCEDURE — 40000193 ZZH STATISTIC PT WARD VISIT

## 2018-01-30 PROCEDURE — 97530 THERAPEUTIC ACTIVITIES: CPT | Mod: GP

## 2018-01-30 PROCEDURE — 12000001 ZZH R&B MED SURG/OB UMMC

## 2018-01-30 PROCEDURE — 25000132 ZZH RX MED GY IP 250 OP 250 PS 637: Mod: GY | Performed by: PSYCHIATRY & NEUROLOGY

## 2018-01-30 PROCEDURE — 99233 SBSQ HOSP IP/OBS HIGH 50: CPT | Performed by: PSYCHIATRY & NEUROLOGY

## 2018-01-30 PROCEDURE — A9270 NON-COVERED ITEM OR SERVICE: HCPCS | Mod: GY | Performed by: INTERNAL MEDICINE

## 2018-01-30 PROCEDURE — 99207 ZZC CDG-MDM COMPONENT: MEETS MODERATE - UP CODED: CPT | Performed by: INTERNAL MEDICINE

## 2018-01-30 PROCEDURE — 99233 SBSQ HOSP IP/OBS HIGH 50: CPT | Performed by: INTERNAL MEDICINE

## 2018-01-30 PROCEDURE — 97116 GAIT TRAINING THERAPY: CPT | Mod: GP

## 2018-01-30 PROCEDURE — A9270 NON-COVERED ITEM OR SERVICE: HCPCS | Mod: GY | Performed by: PSYCHIATRY & NEUROLOGY

## 2018-01-30 PROCEDURE — 36415 COLL VENOUS BLD VENIPUNCTURE: CPT | Performed by: INTERNAL MEDICINE

## 2018-01-30 PROCEDURE — 85027 COMPLETE CBC AUTOMATED: CPT | Performed by: INTERNAL MEDICINE

## 2018-01-30 RX ORDER — ALBUTEROL SULFATE 5 MG/ML
2.5 SOLUTION RESPIRATORY (INHALATION)
Status: DISCONTINUED | OUTPATIENT
Start: 2018-01-30 | End: 2018-02-06 | Stop reason: HOSPADM

## 2018-01-30 RX ORDER — DONEPEZIL HYDROCHLORIDE 5 MG/1
5 TABLET, FILM COATED ORAL EVERY MORNING
Status: DISCONTINUED | OUTPATIENT
Start: 2018-01-31 | End: 2018-01-30

## 2018-01-30 RX ORDER — RIVASTIGMINE 4.6 MG/24H
1 PATCH, EXTENDED RELEASE TRANSDERMAL DAILY
Status: DISCONTINUED | OUTPATIENT
Start: 2018-01-30 | End: 2018-02-06 | Stop reason: HOSPADM

## 2018-01-30 RX ORDER — OLANZAPINE 5 MG/1
5 TABLET ORAL
Status: DISCONTINUED | OUTPATIENT
Start: 2018-01-30 | End: 2018-01-31

## 2018-01-30 RX ADMIN — ALLOPURINOL 100 MG: 100 TABLET ORAL at 07:58

## 2018-01-30 RX ADMIN — TAMSULOSIN HYDROCHLORIDE 0.4 MG: 0.4 CAPSULE ORAL at 22:07

## 2018-01-30 RX ADMIN — ATORVASTATIN CALCIUM 20 MG: 20 TABLET, FILM COATED ORAL at 20:08

## 2018-01-30 RX ADMIN — RIVASTIGMINE 1 PATCH: 4.6 PATCH, EXTENDED RELEASE TRANSDERMAL at 16:19

## 2018-01-30 RX ADMIN — CLINDAMYCIN HYDROCHLORIDE 300 MG: 300 CAPSULE ORAL at 20:08

## 2018-01-30 RX ADMIN — FLUTICASONE FUROATE AND VILANTEROL TRIFENATATE 1 PUFF: 200; 25 POWDER RESPIRATORY (INHALATION) at 07:59

## 2018-01-30 RX ADMIN — FUROSEMIDE 20 MG: 20 TABLET ORAL at 07:58

## 2018-01-30 RX ADMIN — FLUTICASONE PROPIONATE 2 SPRAY: 50 SPRAY, METERED NASAL at 08:00

## 2018-01-30 RX ADMIN — ACETAMINOPHEN 650 MG: 325 TABLET, FILM COATED ORAL at 05:39

## 2018-01-30 RX ADMIN — CLINDAMYCIN HYDROCHLORIDE 300 MG: 300 CAPSULE ORAL at 07:58

## 2018-01-30 RX ADMIN — ASPIRIN 81 MG: 81 TABLET, COATED ORAL at 07:58

## 2018-01-30 RX ADMIN — MULTIPLE VITAMINS W/ MINERALS TAB 1 TABLET: TAB at 07:56

## 2018-01-30 RX ADMIN — BUPROPION HYDROCHLORIDE 150 MG: 150 TABLET, FILM COATED, EXTENDED RELEASE ORAL at 07:58

## 2018-01-30 NOTE — PLAN OF CARE
Problem: Patient Care Overview  Goal: Plan of Care/Patient Progress Review  Outcome: No Change  Patient is disorientated to situation, able to make needs known, using call light appropriately, VPM in place and alarms activated for safety.  VSS, LS clear. CMS intact, Neuros are intact. No c/o pain. BS present, Patient is passing gas, LBM 01/30, Voiding spontaneously.  Tolerating regular diet, needs assistance ordering. Denies dizziness, SOB & CP. No IV access. Foam dressings to jorge luis. Shins. No hallucinations this shift, denies, neuro's intact. Continue POC.

## 2018-01-30 NOTE — CONSULTS
[facility], [place]   Psychiatric Consult  Admission date: 1/26/2018  Deion Diaz  [id number]  01/30/18  Denilson Edmondson    Time: 39 minutes on encounter, >50% of which was spent in counseling and/or coordination of care consisting of: communication and education with the patient, communication with family and or friends if documented in note, lab/image/study evaluation, support staff communication, and other sources pertinent to excellent patient care.             Assessment & Plan:     Diagnosis:  Lewy body dementia until proven otherwise    Assessment:  Deion is currently experiencing the most likely etiology as Lewy body dementia.  It is somewhat convoluted based on his lack of general normal gait and I cannot find parkinsonian type symptoms.  He did not have any rigidity he did not have any tremor at rest.  Unclear at this point if he has a masklike face though it could be in the initial stages.  Generally this condition starts with visual hallucinations in the older population that they are not particularly concerned about.  Generally individuals with this condition are able to identify them as hallucinations and complain about them.  We discussed the utility of ignoring them and also the use of rivastigmine and similar medications as being the primary medication of choice.  I would not suspect antipsychotic medications to be of benefit and it is not necessary to discontinue bupropion unless he does not have response from rivastigmine.    Plan:  Recommend starting rivastigmine 4.6 mg patch and titrate up as necessary  Antipsychotic medications likely of low yield  Discontinuation of bupropion probably not necessary  No need for inpatient psychiatry or a one-to-one  Please reconsult as necessary            HPI:   Deion Diaz with a past medical history of hypertension, coronary artery disease, COPD, gout, obstructive sleep apnea, hyperlipidemia, BPH was admitted 1/26/2018 for  continued hallucinations of more than a year and recent Staphylococcus infection of his hip.    According records he last had hallucinations in 2016 while on opiate medications that resolved after discontinuation.  He has also had urinary tract infection and other types of infection that lead to delirium and subsequent hallucinations.  His urinary toxicology resulted in a positive amphetamine it is likely related to bupropion.  Neurology evaluation has been negative and recommended donepezil as well as the potential for Lewy body dementia.  This was not trialed yet and he has previously tried quetiapine and olanzapine without success in the outpatient setting.  Psychiatric follow-up and evaluation have been discussing reducing bupropion and titrating up on olanzapine.    Upon meeting with him he continues to have mostly visual hallucinations of 2 females one that he describes as the sister of the other and they have children that run around at times.  They do sexual actions towards him.  He has previously had hallucinations of multiple animals possibly starting about 2 years ago to his recollection.  He does not have any depressive symptoms or previous psychiatric conditions.  He does not have any thoughts of harming himself or others or any hopelessness or helplessness.  He started the bupropion related to smoking cessation and had improvement in mood and has been taking this for years.  He has some recent weight loss but was not purposeful but he found it helpful and he is just frustrated with his movement and recent infection.  He does not have any paranoia about staff members and is not experiencing any depressive symptoms whatsoever.  He does not seem to be anxious and would like assistance with the hallucinations he is experiencing and is able to identify them as such.           Physical ROS:   The patient endorsed the above issues. The remainder of 10-point review of systems was negative except as noted in  HPI.         Current Medications:       OLANZapine  7.5 mg Oral At Bedtime     buPROPion  150 mg Oral Daily     aspirin EC  81 mg Oral Daily     atenolol (TENORMIN) tablet 50 mg  50 mg Oral Daily     allopurinol (ZYLOPRIM) tablet 100 mg  100 mg Oral Daily     atorvastatin (LIPITOR) tablet 20 mg  20 mg Oral Daily at 8 pm     fluticasone-vilanterol  1 puff Inhalation Daily     furosemide  20 mg Oral Daily     multivitamin, therapeutic with minerals  1 tablet Oral Daily     tamsulosin (FLOMAX) capsule 0.4 mg  0.4 mg Oral At Bedtime     clindamycin  300 mg Oral BID     fluticasone  2 spray Both Nostrils Daily     sodium chloride (PF)  3 mL Intracatheter Q8H            PTA Medications:     Prescriptions Prior to Admission   Medication Sig Dispense Refill Last Dose     furosemide (LASIX) 40 MG tablet Take 1 tablet (40 mg) by mouth daily Hold for SBP<100 7 tablet 0      gabapentin (NEURONTIN) 300 MG capsule Take 1 capsule (300 mg) by mouth At Bedtime for 7 days 7 capsule 0      acetaminophen (TYLENOL) 325 MG tablet Take 2 tablets (650 mg) by mouth 3 times daily 100 tablet       buPROPion (WELLBUTRIN XL) 150 MG 24 hr tablet Take 1 tablet (150 mg) by mouth daily for 7 days 7 tablet 0      HYDROcodone-acetaminophen (NORCO) 5-325 MG per tablet Take 1 tablet by mouth every 4 hours as needed for moderate to severe pain 21 tablet 0      NONFORMULARY Hydrocortisone 2.5% cream mixed 1:1 with Vanicream apply BID from neck down to affected area   5/31/2017 at am     Ca Carbonate-Mag Hydroxide (ROLAIDS PO) Take by mouth as needed   prn     TAMSULOSIN HCL PO Take 0.4 mg by mouth At Bedtime   5/30/2017 at hs     fluticasone-salmeterol (ADVAIR) 500-50 MCG/DOSE diskus inhaler Inhale 1 puff into the lungs every 12 hours   5/31/2017 at am     albuterol (PROAIR HFA, PROVENTIL HFA, VENTOLIN HFA) 108 (90 BASE) MCG/ACT inhaler Inhale 2 puffs into the lungs every 6 hours as needed    prn     ALLOPURINOL PO Take 100 mg by mouth daily    5/31/2017  at am     ATENOLOL PO Take 50 mg by mouth daily    5/31/2017 at am     ATORVASTATIN CALCIUM PO Take 20 mg by mouth daily         multivitamin, therapeutic with minerals (MULTI-VITAMIN) TABS Take 1 tablet by mouth daily   5/31/2017 at am     NITROGLYCERIN SL Place 0.4 mg under the tongue   prn          Allergies:   No Known Allergies       Labs:     Recent Results (from the past 48 hour(s))   CBC with platelets    Collection Time: 01/29/18  7:55 AM   Result Value Ref Range    WBC 8.0 4.0 - 11.0 10e9/L    RBC Count 4.36 (L) 4.4 - 5.9 10e12/L    Hemoglobin 11.3 (L) 13.3 - 17.7 g/dL    Hematocrit 36.9 (L) 40.0 - 53.0 %    MCV 85 78 - 100 fl    MCH 25.9 (L) 26.5 - 33.0 pg    MCHC 30.6 (L) 31.5 - 36.5 g/dL    RDW 16.3 (H) 10.0 - 15.0 %    Platelet Count 344 150 - 450 10e9/L   Basic metabolic panel    Collection Time: 01/29/18  7:55 AM   Result Value Ref Range    Sodium 145 (H) 133 - 144 mmol/L    Potassium 3.8 3.4 - 5.3 mmol/L    Chloride 111 (H) 94 - 109 mmol/L    Carbon Dioxide 27 20 - 32 mmol/L    Anion Gap 7 3 - 14 mmol/L    Glucose 86 70 - 99 mg/dL    Urea Nitrogen 12 7 - 30 mg/dL    Creatinine 0.90 0.66 - 1.25 mg/dL    GFR Estimate 82 >60 mL/min/1.7m2    GFR Estimate If Black >90 >60 mL/min/1.7m2    Calcium 8.6 8.5 - 10.1 mg/dL   Basic metabolic panel    Collection Time: 01/30/18  5:08 AM   Result Value Ref Range    Sodium 144 133 - 144 mmol/L    Potassium 3.7 3.4 - 5.3 mmol/L    Chloride 110 (H) 94 - 109 mmol/L    Carbon Dioxide 31 20 - 32 mmol/L    Anion Gap 3 3 - 14 mmol/L    Glucose 89 70 - 99 mg/dL    Urea Nitrogen 10 7 - 30 mg/dL    Creatinine 0.93 0.66 - 1.25 mg/dL    GFR Estimate 78 >60 mL/min/1.7m2    GFR Estimate If Black >90 >60 mL/min/1.7m2    Calcium 8.5 8.5 - 10.1 mg/dL          Physical and Psychiatric Examination:     /65  Pulse 91  Temp 96.1  F (35.6  C) (Oral)  Resp 16  Wt 76.3 kg (168 lb 4.8 oz)  SpO2 92%  BMI 28.01 kg/m2  Weight is 168 lbs 4.8 oz  Body mass index is 28.01 kg/(m^2).                                            Weight over time:  Vitals:    01/26/18 1716 01/27/18 1700 01/28/18 1100   Weight: 73.9 kg (163 lb) 77 kg (169 lb 12.1 oz) 76.3 kg (168 lb 4.8 oz)       Mental Status Exam:  Deion is a 77-year-old male wearing a white shirt and is overweight.  His speech is of an appropriate rate and tone and his language is intact.  Behavior is appropriate and he does not have any abnormal movements.  His affect is neutral and he smiles at times.  His mood he describes as all right.  His thought content consists of the above without thoughts of harming himself or others or current delusions.  His thought process is goal without looseness of association.  He does endorse abnormal perceptions.  He is alert and aware of his current location and circumstances.  His attention concentration appears adequate.  His cognition and fund of knowledge appears normal.  His long-term/short-term/remote memory appears intact.  His insight and judgment are both good.    Benjamín Freeman  [facility] Psychiatry      The following document has been created with voice recognition software and may contain unintentional word substitutions.

## 2018-01-30 NOTE — PLAN OF CARE
Problem: Sensory/Perceptual Alteration (Adult)  Goal: Identify Related Risk Factors and Signs and Symptoms  Related risk factors and signs and symptoms are identified upon initiation of Human Response Clinical Practice Guideline (CPG).   Outcome: No Change  VS: VSS   O2: Sats >90 on RA   Output: Urine output adequate   Last BM: 1/230 x 3   Activity: Up with SBA and walker   Skin: Scabs on legs   Pain: Denies any pain   CMS: Denies any numbness or tingling   Dressing: CDI   Diet: Regular diet, well tolerated. No N/V   LDA: SL. Bruising around site   Equipment: Walker, bed alarm, CPAP   Plan: Continue to monitor    Additional Info: Pt demonstrated ability to call before getting out of bed and did not set of bed or chair alarm this shift. VPM was active all shift.

## 2018-01-30 NOTE — PROGRESS NOTES
"[facility], [place]   Internal Medicine Daily Note           Interval History/Events     Overnight events reviewed  Patient reports ongoing hallucinations, unchanged  Poor sleep attributed to hallucinations.   No fever, chills, nausea, vomiting, lightheadedness or dizziness.        Review of Systems        4 point ROS including Respiratory, CV, GI and , other than that noted above is negative      Medications   I have reviewed current medications  in the \"current medication\" section of Epic.  Relevant changes include:     Physical Exam         Vital signs:    Blood pressure 114/60, pulse 91, temperature 96.1  F (35.6  C), temperature source Oral, resp. rate 16, weight 76.3 kg (168 lb 4.8 oz), SpO2 92 %.  Estimated body mass index is 28.01 kg/(m^2) as calculated from the following:    Height as of 5/31/17: 1.651 m (5' 5\").    Weight as of this encounter: 76.3 kg (168 lb 4.8 oz).        General: NAD  HEENT: No icterus, no pallor. At/nc  Cardiovascular: S1, S2 normal  Respiratory:  Non labored. Mild bl end exp wheeze.  GI/Abdomen: Soft, NT,ND  Neurology: Alert, awake, and oriented to time, place and person. No tremors or rigidity.   Extremities: Mild pre tibial edema.   Psychiatry: pleasant.      Laboratory and Imaging Studies     I have reviewed  laboratory and imaging studies in the Epic. Pertinent findings are as below:    BMP    Recent Labs  Lab 01/30/18  0508 01/29/18  0755 01/28/18  0750 01/27/18  1827 01/27/18  0035    145* 146*  --  141   POTASSIUM 3.7 3.8 3.8 3.8 3.3*   CHLORIDE 110* 111* 114*  --  103   LOU 8.5 8.6 8.5  --  9.1   CO2 31 27 25  --  29   BUN 10 12 17  --  23   CR 0.93 0.90 0.87  --  1.11   GLC 89 86 89  --  100*     CBC    Recent Labs  Lab 01/29/18  0755 01/28/18  0750 01/27/18  0035   WBC 8.0 8.0 13.4*   RBC 4.36* 4.04* 4.31*   HGB 11.3* 10.3* 11.4*   HCT 36.9* 34.3* 36.6*   MCV 85 85 85   MCH 25.9* 25.5* 26.5   MCHC 30.6* 30.0* 31.1*   RDW 16.3* 16.3* " Pharmacy is calling requesting a refill of \"generic lancets\" for the patient.      Patient Name: Traci Ellis  Caller Name: Liam  Name of Facility: [facility]  Callback Number: [phone]  Fax Number:   [phone]  Additional Info: Pharmacist will a new order written if doctor approves.  Patient will be out of her lancets in a few days-  Did you confirm the message with the caller?: yes    Thank you,  Alessandra Miller     16.3*    304 332     INRNo lab results found in last 7 days.  LFTs    Recent Labs  Lab 01/27/18  0035   ALKPHOS 171*   AST 33   ALT 27   BILITOTAL 0.4   PROTTOTAL 7.5   ALBUMIN 2.7*      PANCNo lab results found in last 7 days.    MR Brain w/o Contrast 01/29:     Impression:    1. No acute intracranial pathology.  2. Mild cerebral volume loss with mild chronic small vessel ischemic change.  3. Mineralization of the basal ganglia, within expected limits for age.    Impression/Plan      78 yo M with hx of HTN, CAD, COPD,  Gout, BPH s/P TURP, S/P THOMAS admitted for  worsening recurrent vs chronic hallucinations x ~ 1 year (since May 2017)       # Hallucinations: Unkown etiology at this time. Differential broad  Drug screen positive for Amphetamine, and Opiates. Patient, and daughter not aware how he could have Amphetamine in urine. Patient strongly denies any use.   Patient reports regular use of Alcohol. CT head on 01/11 at OSH with no acute abnormality. TSH on 01/27 within normal limits. Vitamin B12, and Folic acid normal.   Treponema Pallidum Antibody negative.   Psychiatry and Neurology has evaluated the patient  - MRI Brain : as above.   - d/w Neurology: rec: aricept 5 and fu at Neurology clinic in 1 month.   - Psychiatry fu consult reviewed. Noted psychiatry started patient on rivastigmine 1/30/2018. Will hold off on Aricept for now.      - Continue Bupropion. Plan 150 mg daily for 5 days and dc.   - Per Psychiatry, antipsychotic  Likely of low yield. Dc scheduled olanzapine, will keep on 5 mg HS Prn for agitation, sleep.     - tried contacting Psychiatry, left voice message, a/w call back.     - Neuro: Outpatient neuropsychological testing to better assess his visual and non-visual cognitive abilities. Would suggest that he see Dr. Kimani Abbott in the [facility].   However patient daughter Ms Ortiz wants him to fu with Neurology at [facility].     - Ct to monitor.     # S/p left hip replacement: C/b  prosthesis infection. On Clindamycin suppression  - Continue Clindamycin  - Follow up with Orthopedics for continued management     # Pyuria: Initially started on Ceftriaxone for possible UTI. Urine culture without growth, and patient had no urinary symptoms thus Ceftriaxone was discontinued.   - Continue to monitor    # BPH s/p TURP on 10/30/2017: On Tamsulosin  - Continue   - Scheduled to follow up with urology on 3/9/2018    # Hx of COPD: PTA on Advair Diskus  - Continue LABA+ICS    # Hx of CAD (s/p coronary stent placement in 2003): On Atenolol. Not on Aspirin. Not sure why.   - Continue Atenolol  - Continue Aspirin    # Hx of SANTIAGO: Continue CPAP    # Hx of Hyperlipidemia: PTA Atorvastatin  - Continue    # PTA on Furosemide: No past hx of HF noted. Patient unsure of exact reason  - continue for now    # FEN: Regular diet  # Activity : Up with assist. PT consult  # Prophylaxis:  Ambulate with assist.   # Code status: Full     Disposition Plan   Expected discharge in 2-3 days to TCU vs LTC      Entered: Jose Raul Santamaria 01/30/2018, 7:49 AM        Patient daughter Ms Breen called and Updated in details. Q answered.     Pt's care was discussed with bedside RN, patient and  during Care Team Rounds.  D/w Neurology.

## 2018-01-30 NOTE — PLAN OF CARE
"Problem: Patient Care Overview  Goal: Plan of Care/Patient Progress Review  Discharge Planner PT   Patient plan for discharge: Pt waiting to \"hear the plan\" to decide DC plan  Current status: Pt is SBA for transfers and CGA to amb 210 ft. Pt participated in strengthening exercises and gait to improve endurance and stability with gait. Pt encouraged to amb in [place] 4x/day with PT and RN staff.  Barriers to return to prior living situation: impaired balance, BLE weakness, SOB with little activity  Recommendations for discharge: TCU  Rationale for recommendations: To improve pts safety and IND with functional mobility       Entered by: Laura Donaldson 01/30/2018 11:50 AM         "

## 2018-01-30 NOTE — CONSULTS
Social Work: Assessment with Discharge Plan    Patient Name:  Deion Diaz  :  1940  Age:  77 year old  MRN:  [id number]  Risk/Complexity Score:  Filed Complexity Screen Score: 6  Completed assessment with:  Pt, adult daughters (Mackenzie and Diana). Writer left voicemail message for Nuha [phone] with pt's verbal consent     Presenting Information   Reason for Referral:  Discharge plan  Date of Intake:  2018  Referral Source:  Physician  Decision Maker:  Pt with support of adult children  Alternate Decision Maker: per policy, pt's NOK-Adult children Deion (son), Daughter Mackenzie or Daughter Diana    Living Situation:  House  Previous Functional Status:  Independent  Patient and family understanding of hospitalization:  Seeking diagnosis/plan for pt's frequent falls and hallucination  Cultural/Language/Spiritual Considerations:    Adjustment to Illness:  Frustrated, stressful. Pt wants to DC home w/resumption of home care but medical team supports DC to TCU    Physical Health  Reason for Admission:    1. Hallucinations of tactile sensation    2. Visual hallucination      Services Needed/Recommended:  TCU    Mental Health/Chemical Dependency  Diagnosis:  ? Lewy Body  Support/Services in Place:  none  Services Needed/Recommended:  Further Neuro Psych testing    Support System  Significant relationship at present time:  Yes, adult children  Family of origin is available for support:  Yes  Other support available:  Pt identifies having home care involvement  Gaps in support system:  Pt may need higher level of care  Patient is caregiver to:  None     Provider Information   Primary Care Physician:  Jerrod Graves   [phone]   Clinic:  [facility] [address] / [place]*      :      Financial   Income Source:  Pension, social security  Financial Concerns:  None noted  Insurance:    Payor/Plan Subscriber Name Rel Member # Group #   MEDICARE - MEDICARE  "EMILIA ALMARAZ  [id number]       ATTN CLAIMS, [address]   [facility]* EMILIA ALMARAZ  [id number]      [address], [place] [address]       Discharge Plan   Patient and family discharge goal:  Pt verbalizes agreement to referral to [facility], stated preference is to DC home w/resumption of home care. Pt is happy to have involvement of his adult children in DC planning  Provided education on discharge plan:  YES  Patient agreeable to discharge plan:  YES  A list of Medicare Certified Facilities was provided to the patient and/or family to encourage patient choice. Patient's choices for facility are:  [facility]-pt has had previous stay there  Will NH provide Skilled rehabilitation or complex medical:  YES  General information regarding anticipated insurance coverage and possible out of pocket cost was discussed. Patient and patient's family are aware patient may incur the cost of transportation to the facility, pending insurance payment: YES  Barriers to discharge:  Assessment process    Discharge Recommendations   Anticipated Disposition:  Facility:  [facility] [phone]  Transportation Needs:  Family:  adult children can provide  Name of Transportation Company and Phone:  Adult children can provide    Additional comments   Writer introduced role/reason for visit. 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 He states he had just woken up from a nap, asked if he should return to his room \"805 (he is in 803) and needed verbal cuing to reorient to place. After being verbally cued, pt looked around and said \"Oh you're right, I am in my room.\" He then shared that he did not get good sleep because the \"couple of ladies came to spend the night. I would be okay with that if they left me alone but they did not.\" Pt provided consent for writer to speak w/ his adult daughters and involve them in DC plan. He also consented to referral to [facility]. Writer left voicemail message " amalia Ortiz. Writer spoke w/Reesa in Admissions at [facility] and sen assessment information via Epic. Await response. SUNNY con't to follow

## 2018-01-30 NOTE — PLAN OF CARE
Problem: Patient Care Overview  Goal: Plan of Care/Patient Progress Review  Pt. Alert and oriented. VSS. Afebrile. LS - exp wheeze. Maintaining sats on RA. Bowel sounds active, LBM 1/30. No difficulty voiding. PP+ DP+. CMS and neuro's are intact. Denies numbness and tingling in all extremities. Denies nausea, and chest pain. Denies pain. Scabs present to LEs, bruising around IV site. Tolerating regular diet. Pt up with SBA and FWW. PIV is patent and SL. Pt has been awake most of the night. Bed and chair alarms in use, pt has been instructed to use the call light and call if he wants to get up, however has not been calling, continue to reinforce. VPM also in use.  Will continue to monitor.

## 2018-01-31 ENCOUNTER — APPOINTMENT (OUTPATIENT)
Dept: PHYSICAL THERAPY | Facility: CLINIC | Age: 78
DRG: 884 | End: 2018-01-31
Payer: MEDICARE

## 2018-01-31 LAB
ANION GAP SERPL CALCULATED.3IONS-SCNC: 4 MMOL/L (ref 3–14)
BUN SERPL-MCNC: 14 MG/DL (ref 7–30)
CALCIUM SERPL-MCNC: 8.3 MG/DL (ref 8.5–10.1)
CHLORIDE SERPL-SCNC: 110 MMOL/L (ref 94–109)
CO2 SERPL-SCNC: 30 MMOL/L (ref 20–32)
CREAT SERPL-MCNC: 1.01 MG/DL (ref 0.66–1.25)
ERYTHROCYTE [DISTWIDTH] IN BLOOD BY AUTOMATED COUNT: 16.1 % (ref 10–15)
GFR SERPL CREATININE-BSD FRML MDRD: 71 ML/MIN/1.7M2
GLUCOSE SERPL-MCNC: 106 MG/DL (ref 70–99)
HCT VFR BLD AUTO: 36.3 % (ref 40–53)
HGB BLD-MCNC: 11.1 G/DL (ref 13.3–17.7)
MCH RBC QN AUTO: 26.1 PG (ref 26.5–33)
MCHC RBC AUTO-ENTMCNC: 30.6 G/DL (ref 31.5–36.5)
MCV RBC AUTO: 85 FL (ref 78–100)
PLATELET # BLD AUTO: 330 10E9/L (ref 150–450)
POTASSIUM SERPL-SCNC: 3.9 MMOL/L (ref 3.4–5.3)
RBC # BLD AUTO: 4.26 10E12/L (ref 4.4–5.9)
SODIUM SERPL-SCNC: 144 MMOL/L (ref 133–144)
VIT B1 BLD-MCNC: 138 NMOL/L (ref 70–180)
WBC # BLD AUTO: 8.3 10E9/L (ref 4–11)

## 2018-01-31 PROCEDURE — 80048 BASIC METABOLIC PNL TOTAL CA: CPT | Performed by: INTERNAL MEDICINE

## 2018-01-31 PROCEDURE — 97110 THERAPEUTIC EXERCISES: CPT | Mod: GP | Performed by: PHYSICAL THERAPIST

## 2018-01-31 PROCEDURE — 97116 GAIT TRAINING THERAPY: CPT | Mod: GP | Performed by: PHYSICAL THERAPIST

## 2018-01-31 PROCEDURE — 25000132 ZZH RX MED GY IP 250 OP 250 PS 637: Mod: GY | Performed by: PSYCHIATRY & NEUROLOGY

## 2018-01-31 PROCEDURE — 25000132 ZZH RX MED GY IP 250 OP 250 PS 637: Mod: GY | Performed by: INTERNAL MEDICINE

## 2018-01-31 PROCEDURE — A9270 NON-COVERED ITEM OR SERVICE: HCPCS | Mod: GY | Performed by: PSYCHIATRY & NEUROLOGY

## 2018-01-31 PROCEDURE — 99207 ZZC CDG-MDM COMPONENT: MEETS MODERATE - UP CODED: CPT | Performed by: INTERNAL MEDICINE

## 2018-01-31 PROCEDURE — 40000193 ZZH STATISTIC PT WARD VISIT: Performed by: PHYSICAL THERAPIST

## 2018-01-31 PROCEDURE — A9270 NON-COVERED ITEM OR SERVICE: HCPCS | Mod: GY | Performed by: INTERNAL MEDICINE

## 2018-01-31 PROCEDURE — 99233 SBSQ HOSP IP/OBS HIGH 50: CPT | Performed by: INTERNAL MEDICINE

## 2018-01-31 PROCEDURE — 85027 COMPLETE CBC AUTOMATED: CPT | Performed by: INTERNAL MEDICINE

## 2018-01-31 PROCEDURE — 12000001 ZZH R&B MED SURG/OB UMMC

## 2018-01-31 PROCEDURE — 36415 COLL VENOUS BLD VENIPUNCTURE: CPT | Performed by: INTERNAL MEDICINE

## 2018-01-31 RX ADMIN — ACETAMINOPHEN 650 MG: 325 TABLET, FILM COATED ORAL at 11:55

## 2018-01-31 RX ADMIN — ALLOPURINOL 100 MG: 100 TABLET ORAL at 09:30

## 2018-01-31 RX ADMIN — BUPROPION HYDROCHLORIDE 150 MG: 150 TABLET, FILM COATED, EXTENDED RELEASE ORAL at 09:28

## 2018-01-31 RX ADMIN — FLUTICASONE FUROATE AND VILANTEROL TRIFENATATE 1 PUFF: 200; 25 POWDER RESPIRATORY (INHALATION) at 09:35

## 2018-01-31 RX ADMIN — FLUTICASONE PROPIONATE 2 SPRAY: 50 SPRAY, METERED NASAL at 09:35

## 2018-01-31 RX ADMIN — CLINDAMYCIN HYDROCHLORIDE 300 MG: 300 CAPSULE ORAL at 21:17

## 2018-01-31 RX ADMIN — FUROSEMIDE 20 MG: 20 TABLET ORAL at 09:29

## 2018-01-31 RX ADMIN — ASPIRIN 81 MG: 81 TABLET, COATED ORAL at 09:28

## 2018-01-31 RX ADMIN — MULTIPLE VITAMINS W/ MINERALS TAB 1 TABLET: TAB at 09:29

## 2018-01-31 RX ADMIN — RIVASTIGMINE 1 PATCH: 4.6 PATCH, EXTENDED RELEASE TRANSDERMAL at 09:28

## 2018-01-31 RX ADMIN — ATENOLOL 50 MG: 50 TABLET ORAL at 09:28

## 2018-01-31 RX ADMIN — TAMSULOSIN HYDROCHLORIDE 0.4 MG: 0.4 CAPSULE ORAL at 21:17

## 2018-01-31 RX ADMIN — ATORVASTATIN CALCIUM 20 MG: 20 TABLET, FILM COATED ORAL at 21:17

## 2018-01-31 RX ADMIN — CLINDAMYCIN HYDROCHLORIDE 300 MG: 300 CAPSULE ORAL at 09:29

## 2018-01-31 NOTE — PLAN OF CARE
Problem: Patient Care Overview  Goal: Plan of Care/Patient Progress Review  Patient is disorientated to situation but has been calling appropriately. VPM on. VSS. CMS and Neuros are intact. Pt denies pain. Denies dizziness, SOB & CP. Pt denies hallucinations. Continue to monitor.

## 2018-01-31 NOTE — PROGRESS NOTES
"Social Work Services Progress Note    Hospital Day: 6  =  Collaborated with:  8A IDT, pt's daughter Diana    Data:  DC planning    Intervention:  Writer received voicemail message from Lisseth at [facility] stating they are assessing pt but concerned about pt's hallucinations.    Diana left several voicemail messages, writer returned her call and spoke with her on phone for 1< (greater than 1 hour).  Writer provided education/information on different levels of care and recommendations of psychiatry and neurology. Diana expressed frustration with resources available, multiple MD involvement, [facility] not being able to meet pt's needs. Writer validated Diana's frustrations.    Writer provided education on Savage Body disease, dementia process, and how multiple psychiatrists have assessed pt as having a neuro disorder as underlying/cause of hallucination and balance issues. Writer provided resources to [facility] [place] and [employer] [place] and [place] as resources for family to have support and learn more.    Diana is aware [facility] is assessing. She is concerned about writer raising issue that pt may benefit from memory care unit, and discussed differences in level of care between [facility] and and intermediate that can support and provide behavioral intervention. Writer provided \"[facility]\" as support resource if [facility] can no longer meet pt's needs.    Assessment:  family are frustrated with hospitalization, frustrated with belief that pt would be placed on [facility] psych unit and frustrated that he does not meet psych admit criteria.     Plan:    Anticipated Disposition:  TCU vs. [facility] setting    Barriers to d/c plan:  Finding placement    Follow Up:  SW con't to follow    "

## 2018-01-31 NOTE — PLAN OF CARE
Problem: Patient Care Overview  Goal: Plan of Care/Patient Progress Review  Discharge Planner PT   Patient plan for discharge: TCU  Current status: Pt hoping to discharge to [facility]. Pt ambulated 125' x2 during session but demonstrates tandem walking and erratic foot placement, able to correct with cues momentarily but not for remainder of ambulation. Compelted standing balance and strength exercises in room, fatigued.   Barriers to return to prior living situation: Balance impairment, fall risk, deconditioning limiting IND mobility  Recommendations for discharge: TCU  Rationale for recommendations: For continued strengthening and facilitation of balance exercises       Entered by: Maria Pryor 01/31/2018 4:29 PM

## 2018-01-31 NOTE — PLAN OF CARE
Problem: Sensory/Perceptual Alteration (Adult)  Goal: Identify Related Risk Factors and Signs and Symptoms  Related risk factors and signs and symptoms are identified upon initiation of Human Response Clinical Practice Guideline (CPG).   Outcome: No Change  VS: VSS   O2: Sats >90 on RA   Output: Urine output adequate   Last BM: 12/31 x 3   Activity: Up with SBA and walker   Skin: Scabs on legs   Pain: Denies any pain   CMS: Denies any numbness or tingling   Dressing: CDI   Diet: Regular diet, well tolerated. No N/V   LDA: SL. Bruising around site   Equipment: Walker, bed alarm, CPAP   Plan: Continue to monitor    Additional Info: Pt demonstrated ability to call before getting out of bed and did not set off bed or chair alarm this shift. VPM was active all shift. Pt did not report any hallucinations this shift.

## 2018-01-31 NOTE — PROGRESS NOTES
"[facility], [place]   Internal Medicine Daily Note           Interval History/Events     Overnight events reviewed  Patient slept better last night  Hallucinations still present, essentially unchanged.   No fever, chills, nausea, vomiting, lightheadedness or dizziness.        Review of Systems        4 point ROS including Respiratory, CV, GI and , other than that noted above is negative      Medications   I have reviewed current medications  in the \"current medication\" section of Epic.  Relevant changes include:     Physical Exam         Vital signs:    Blood pressure 112/63, pulse 84, temperature 96.5  F (35.8  C), temperature source Oral, resp. rate 15, weight 76.3 kg (168 lb 4.8 oz), SpO2 95 %.  Estimated body mass index is 28.01 kg/(m^2) as calculated from the following:    Height as of 5/31/17: 1.651 m (5' 5\").    Weight as of this encounter: 76.3 kg (168 lb 4.8 oz).        General: NAD  HEENT: No icterus, no pallor. At/nc  Cardiovascular: S1, S2 normal  Respiratory:  Non labored. Mild bl end exp wheeze.  GI/Abdomen: Soft, NT,ND  Neurology: Alert, awake, and oriented to time, place and person. No tremors or rigidity.   Extremities: Mild pre tibial edema.   Psychiatry: pleasant.      Laboratory and Imaging Studies     I have reviewed  laboratory and imaging studies in the Epic. Pertinent findings are as below:    BMP    Recent Labs  Lab 01/31/18  0516 01/30/18  0508 01/29/18  0755 01/28/18  0750    144 145* 146*   POTASSIUM 3.9 3.7 3.8 3.8   CHLORIDE 110* 110* 111* 114*   LOU 8.3* 8.5 8.6 8.5   CO2 30 31 27 25   BUN 14 10 12 17   CR 1.01 0.93 0.90 0.87   * 89 86 89     CBC    Recent Labs  Lab 01/31/18  0516 01/29/18  0755 01/28/18  0750 01/27/18  0035   WBC 8.3 8.0 8.0 13.4*   RBC 4.26* 4.36* 4.04* 4.31*   HGB 11.1* 11.3* 10.3* 11.4*   HCT 36.3* 36.9* 34.3* 36.6*   MCV 85 85 85 85   MCH 26.1* 25.9* 25.5* 26.5   MCHC 30.6* 30.6* 30.0* 31.1*   RDW 16.1* 16.3* 16.3* 16.3* "    344 304 332     INRNo lab results found in last 7 days.  LFTs    Recent Labs  Lab 01/27/18  0035   ALKPHOS 171*   AST 33   ALT 27   BILITOTAL 0.4   PROTTOTAL 7.5   ALBUMIN 2.7*      PANCNo lab results found in last 7 days.    MR Brain w/o Contrast 01/29:     Impression:    1. No acute intracranial pathology.  2. Mild cerebral volume loss with mild chronic small vessel ischemic change.  3. Mineralization of the basal ganglia, within expected limits for age.    Impression/Plan      78 yo M with hx of HTN, CAD, COPD,  Gout, BPH s/P TURP, S/P THOMAS admitted for  worsening recurrent vs chronic hallucinations x ~ 1 year (since May 2017)       # Hallucinations: Unkown etiology at this time. Differential broad  Drug screen positive for Amphetamine, and Opiates. Patient and family denies any h/o amphetamine or drug use. ? False positive 2/2 bupropion.   Patient reports regular use of Alcohol. CT head on 01/11 at OSH with no acute abnormality. TSH on 01/27 within normal limits. Vitamin B12, and Folic acid normal.   TPA negative.     Psychiatry and Neurology has evaluated the patient.  OT Cognition eval 01/28: Patient MOCA scoring 26/30 (score of 26 or greater is considered normal). Had mild difficulty with drawing time on clock (visuospatial/excutive), able to recall 4/5 words, and with abstraction (stating how watch and ruler are alike).      ? lewy body dementia    - MRI Brain : as above.   - 01/30: d/w Neurology: rec: aricept 5 and fu at Neurology clinic in 1 month. Did not start as patient started on rivastigmine patch   - 01/30: Psychiatry fu consult reviewed. Noted psychiatry started patient on rivastigmine 1/30/2018.     1/31/2018: Continue rivastigmine for now. May be titrated as tolerated.   Dc Olanzepine and taper off bupropion as planned earlier,  as no evidence that it was helping the patient symptoms and possible making worse.    per psychiatry,not a candidate for [facility] psychiatry unit at current.     - FU  with Psychiatry inpatient and outpatient prn.      - Neuro: Outpatient neuropsychological testing to better assess his visual and non-visual cognitive abilities. Would suggest that he see Dr. Kimani Abbott in the [facility].   However patient daughter Ms Ortiz wants him to fu with Neurology at [facility].     - continue to monitor using VPM.    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 Urine culture without growth, and patient had no urinary symptoms thus Ceftriaxone was discontinued.   - Continue to monitor    # BPH s/p TURP on 10/30/2017: On Tamsulosin  - Continue   - Scheduled to follow up with urology on 3/9/2018    # Hx of COPD: PTA on Advair Diskus  - Continue LABA+ICS    # Hx of CAD (s/p coronary stent placement in 2003): On Atenolol. Not on Aspirin. Not sure why.   - Continue Atenolol  - Continue Aspirin    # Hx of SANTIAGO: Continue CPAP    # Hx of Hyperlipidemia: PTA Atorvastatin  - Continue    # PTA on Furosemide: No past hx of HF noted. Patient unsure of exact reason  - continue for now    # FEN: Regular diet  # Activity : Up with assist. PT consult  # Prophylaxis:  Ambulate with assist.   # Code status: Full     Disposition Plan   Expected discharge in 2-3 days to TCU vs LTC      Entered: Jose Raul Santamaria 01/31/2018, 7:22 AM   Sw on board for placement.         Patient daughter Ms Breen called and Updated in details. Q answered. Kindly offered her to take him for second opinion if not happy with the our evaluation and plan.        Pt's care was discussed with bedside RN, patient and  during Care Team Rounds.            Jose Raul Santamaria MD   Hospitalist (Internal Medicine)  [facility]  Pager: [phone].

## 2018-02-01 ENCOUNTER — APPOINTMENT (OUTPATIENT)
Dept: PHYSICAL THERAPY | Facility: CLINIC | Age: 78
DRG: 884 | End: 2018-02-01
Payer: MEDICARE

## 2018-02-01 LAB
ANION GAP SERPL CALCULATED.3IONS-SCNC: 5 MMOL/L (ref 3–14)
BUN SERPL-MCNC: 14 MG/DL (ref 7–30)
CALCIUM SERPL-MCNC: 8.4 MG/DL (ref 8.5–10.1)
CHLORIDE SERPL-SCNC: 107 MMOL/L (ref 94–109)
CO2 SERPL-SCNC: 31 MMOL/L (ref 20–32)
CREAT SERPL-MCNC: 1.06 MG/DL (ref 0.66–1.25)
ERYTHROCYTE [DISTWIDTH] IN BLOOD BY AUTOMATED COUNT: 16.1 % (ref 10–15)
GFR SERPL CREATININE-BSD FRML MDRD: 67 ML/MIN/1.7M2
GLUCOSE SERPL-MCNC: 144 MG/DL (ref 70–99)
HCT VFR BLD AUTO: 39.7 % (ref 40–53)
HGB BLD-MCNC: 11.9 G/DL (ref 13.3–17.7)
MCH RBC QN AUTO: 25.5 PG (ref 26.5–33)
MCHC RBC AUTO-ENTMCNC: 30 G/DL (ref 31.5–36.5)
MCV RBC AUTO: 85 FL (ref 78–100)
PLATELET # BLD AUTO: 363 10E9/L (ref 150–450)
POTASSIUM SERPL-SCNC: 4.1 MMOL/L (ref 3.4–5.3)
RBC # BLD AUTO: 4.67 10E12/L (ref 4.4–5.9)
SODIUM SERPL-SCNC: 143 MMOL/L (ref 133–144)
WBC # BLD AUTO: 9.1 10E9/L (ref 4–11)

## 2018-02-01 PROCEDURE — 97110 THERAPEUTIC EXERCISES: CPT | Mod: GP | Performed by: PHYSICAL THERAPIST

## 2018-02-01 PROCEDURE — A9270 NON-COVERED ITEM OR SERVICE: HCPCS | Mod: GY | Performed by: INTERNAL MEDICINE

## 2018-02-01 PROCEDURE — 25000132 ZZH RX MED GY IP 250 OP 250 PS 637: Mod: GY | Performed by: INTERNAL MEDICINE

## 2018-02-01 PROCEDURE — 12000001 ZZH R&B MED SURG/OB UMMC

## 2018-02-01 PROCEDURE — 85027 COMPLETE CBC AUTOMATED: CPT | Performed by: INTERNAL MEDICINE

## 2018-02-01 PROCEDURE — 36415 COLL VENOUS BLD VENIPUNCTURE: CPT | Performed by: INTERNAL MEDICINE

## 2018-02-01 PROCEDURE — 80048 BASIC METABOLIC PNL TOTAL CA: CPT | Performed by: INTERNAL MEDICINE

## 2018-02-01 PROCEDURE — 97116 GAIT TRAINING THERAPY: CPT | Mod: GP | Performed by: PHYSICAL THERAPIST

## 2018-02-01 PROCEDURE — A9270 NON-COVERED ITEM OR SERVICE: HCPCS | Mod: GY | Performed by: PSYCHIATRY & NEUROLOGY

## 2018-02-01 PROCEDURE — 25000132 ZZH RX MED GY IP 250 OP 250 PS 637: Mod: GY | Performed by: PSYCHIATRY & NEUROLOGY

## 2018-02-01 PROCEDURE — 99232 SBSQ HOSP IP/OBS MODERATE 35: CPT | Performed by: INTERNAL MEDICINE

## 2018-02-01 PROCEDURE — 40000193 ZZH STATISTIC PT WARD VISIT: Performed by: PHYSICAL THERAPIST

## 2018-02-01 RX ORDER — FUROSEMIDE 20 MG
20 TABLET ORAL
Status: DISCONTINUED | OUTPATIENT
Start: 2018-02-01 | End: 2018-02-06 | Stop reason: HOSPADM

## 2018-02-01 RX ADMIN — ACETAMINOPHEN 650 MG: 325 TABLET, FILM COATED ORAL at 14:41

## 2018-02-01 RX ADMIN — ATORVASTATIN CALCIUM 20 MG: 20 TABLET, FILM COATED ORAL at 21:19

## 2018-02-01 RX ADMIN — FLUTICASONE FUROATE AND VILANTEROL TRIFENATATE 1 PUFF: 200; 25 POWDER RESPIRATORY (INHALATION) at 09:03

## 2018-02-01 RX ADMIN — ASPIRIN 81 MG: 81 TABLET, COATED ORAL at 08:59

## 2018-02-01 RX ADMIN — FLUTICASONE PROPIONATE 2 SPRAY: 50 SPRAY, METERED NASAL at 08:58

## 2018-02-01 RX ADMIN — FUROSEMIDE 20 MG: 20 TABLET ORAL at 08:59

## 2018-02-01 RX ADMIN — RIVASTIGMINE 1 PATCH: 4.6 PATCH, EXTENDED RELEASE TRANSDERMAL at 12:16

## 2018-02-01 RX ADMIN — BUPROPION HYDROCHLORIDE 150 MG: 150 TABLET, FILM COATED, EXTENDED RELEASE ORAL at 08:59

## 2018-02-01 RX ADMIN — ALLOPURINOL 100 MG: 100 TABLET ORAL at 08:59

## 2018-02-01 RX ADMIN — CLINDAMYCIN HYDROCHLORIDE 300 MG: 300 CAPSULE ORAL at 21:19

## 2018-02-01 RX ADMIN — ATENOLOL 50 MG: 50 TABLET ORAL at 08:59

## 2018-02-01 RX ADMIN — MULTIPLE VITAMINS W/ MINERALS TAB 1 TABLET: TAB at 08:59

## 2018-02-01 RX ADMIN — CLINDAMYCIN HYDROCHLORIDE 300 MG: 300 CAPSULE ORAL at 08:59

## 2018-02-01 RX ADMIN — TAMSULOSIN HYDROCHLORIDE 0.4 MG: 0.4 CAPSULE ORAL at 21:19

## 2018-02-01 NOTE — PLAN OF CARE
Problem: Infection, Risk/Actual (Adult)  Goal: Identify Related Risk Factors and Signs and Symptoms  Related risk factors and signs and symptoms are identified upon initiation of Human Response Clinical Practice Guideline (CPG).           VS:       Pt A/O X 3, disoriented to situation. Pt still seeing visual hallucinations in room, understands they are not real. Afebrile. VSS. Lungs- clear bilaterally with both anterior and posterior. IS encouraged. Denies nausea, shortness of breath, and chest pain.     Output:       Bowels- active in all four quadrants. BM today per pt report. Voids spontaneously without difficulty in the bathroom.      Activity:       Pt up in room, to bathroom, and in halls with assist of standby and walker.     Skin:   Abrasions and scabs to all extremities, bruising throughout body. Mepliex placed on larger open abrasions on BLEs.     Pain:       Has pain in the back and given prn Tylenol and is tolerating well.      CMS:       CMS and Neuro's are intact. Denies numbness and tingling in all extremities.      Dressing:       Dressings to BLEs were changed this shift after patient showered.      Diet:       Pt is on a regular diet and appetite was good this shift.       LDA:       Pt has no PIV access.      Equipment:       Bilateral heels are elevated off the bed.     Plan:       Pt is able to make needs known and the call light is within the pt's reach. Continue to monitor.       Additional Info:       VPM on and activated in room, bed alarm on when in bed, and chair alarm on while in chair. Pt calling appropriately this shift. .

## 2018-02-01 NOTE — PROGRESS NOTES
"[facility], [place]   Internal Medicine Daily Note           Interval History/Events     Overnight events reviewed  Feels better.   Hallucinations still present, slightly improved   No fever, chills  No NV. remigio orals.   No LH or dizziness.   No cp or sob.  No focal s/s.        Review of Systems        4 point ROS including Respiratory, CV, GI and , other than that noted above is negative      Medications   I have reviewed current medications  in the \"current medication\" section of Epic.  Relevant changes include:     Physical Exam         Vital signs:    Blood pressure 126/56, pulse 82, temperature 95.6  F (35.3  C), temperature source Oral, resp. rate 16, weight 76.3 kg (168 lb 4.8 oz), SpO2 94 %.  Estimated body mass index is 28.01 kg/(m^2) as calculated from the following:    Height as of 5/31/17: 1.651 m (5' 5\").    Weight as of this encounter: 76.3 kg (168 lb 4.8 oz).        General: NAD  HEENT: No icterus, no pallor. At/nc  Cardiovascular: S1, S2 normal  Respiratory:  Non labored. Mild bl end exp wheeze.  GI/Abdomen: Soft, NT,ND  Neurology: Alert, awake, and oriented to time, place and person. No tremors or rigidity.   Extremities: Mild pre tibial edema.   Psychiatry: pleasant.      Laboratory and Imaging Studies     I have reviewed  laboratory and imaging studies in the Epic. Pertinent findings are as below:    BMP    Recent Labs  Lab 02/01/18  0536 01/31/18  0516 01/30/18  0508 01/29/18  0755    144 144 145*   POTASSIUM 4.1 3.9 3.7 3.8   CHLORIDE 107 110* 110* 111*   LOU 8.4* 8.3* 8.5 8.6   CO2 31 30 31 27   BUN 14 14 10 12   CR 1.06 1.01 0.93 0.90   * 106* 89 86     CBC    Recent Labs  Lab 02/01/18  0536 01/31/18  0516 01/29/18  0755 01/28/18  0750   WBC 9.1 8.3 8.0 8.0   RBC 4.67 4.26* 4.36* 4.04*   HGB 11.9* 11.1* 11.3* 10.3*   HCT 39.7* 36.3* 36.9* 34.3*   MCV 85 85 85 85   MCH 25.5* 26.1* 25.9* 25.5*   MCHC 30.0* 30.6* 30.6* 30.0*   RDW 16.1* 16.1* 16.3* 16.3* "    330 344 304     INRNo lab results found in last 7 days.  LFTs    Recent Labs  Lab 01/27/18  0035   ALKPHOS 171*   AST 33   ALT 27   BILITOTAL 0.4   PROTTOTAL 7.5   ALBUMIN 2.7*      PANCNo lab results found in last 7 days.    MR Brain w/o Contrast 01/29:     Impression:    1. No acute intracranial pathology.  2. Mild cerebral volume loss with mild chronic small vessel ischemic change.  3. Mineralization of the basal ganglia, within expected limits for age.    Impression/Plan      76 yo M with hx of HTN, CAD, COPD,  Gout, BPH s/P TURP, S/P THOMAS admitted for  worsening recurrent vs chronic hallucinations x ~ 1 year (since May 2017)       # Hallucinations: Unkown etiology at this time. Differential broad  Drug screen positive for Amphetamine, and Opiates. Patient and family denies any h/o amphetamine or drug use. ? False positive 2/2 bupropion.   Patient reports regular use of Alcohol. CT head on 01/11 at OSH with no acute abnormality. TSH on 01/27 within normal limits. Vitamin B12, and Folic acid normal.   TPA negative.     Psychiatry and Neurology has evaluated the patient.  OT Cognition eval 01/28: Patient MOCA scoring 26/30 (score of 26 or greater is considered normal). Had mild difficulty with drawing time on clock (visuospatial/excutive), able to recall 4/5 words, and with abstraction (stating how watch and ruler are alike).      ? lewy body dementia    - MRI Brain : as above.   - 01/30: d/w Neurology: rec: aricept 5 and fu at Neurology clinic in 1 month. Did not start as patient started on rivastigmine patch   - 01/30: Psychiatry fu consult reviewed. Noted psychiatry started patient on rivastigmine 1/30/2018.     1/31/2018: Continue rivastigmine for now. May be titrated as tolerated.   Dc Olanzepine and taper off bupropion as planned earlier,  as no evidence that it was helping the patient symptoms and possible making worse.    per psychiatry,not a candidate for [facility] psychiatry unit at current.     - FU  with Psychiatry inpatient and outpatient prn.      - Neuro: Outpatient neuropsychological testing to better assess his visual and non-visual cognitive abilities. Would suggest that he see Dr. Kimani Abbott in the [facility].   However patient daughter Ms Ortiz wants him to fu with Neurology at [facility].     - continue to monitor using VPM.  - a/w Placement. Sw working with the family.     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 Urine culture without growth, and patient had no urinary symptoms thus Ceftriaxone was discontinued.   - Continue to monitor    # BPH s/p TURP on 10/30/2017: On Tamsulosin  - Continue   - Scheduled to follow up with urology on 3/9/2018    # Hx of COPD: PTA on Advair Diskus  - Continue LABA+ICS    # Hx of CAD (s/p coronary stent placement in 2003): On Atenolol. Not on Aspirin. Not sure why.   - Continue Atenolol  - Continue Aspirin    # Hx of SANTIAGO: Continue CPAP    # Hx of Hyperlipidemia: PTA Atorvastatin  - Continue    # PTA on Furosemide: No past hx of HF noted. Patient unsure of exact reason  - Cr slightly trending up.   - will hold for now.   - monitor.       # FEN: Regular diet  # Activity : Up with assist. PT consult  # Prophylaxis:  Ambulate with assist.   # Code status: Full     Disposition Plan   Expected discharge TCU vs LTC. A/w placement.      Entered: Jose Raul Santamaria 02/01/2018, 2:11 PM   Sw on board for placement.       01/31/18  Patient daughter Ms Breen called and Updated in details. Q answered. Kindly offered her to take him for second opinion if not happy with the our evaluation and plan.        Pt's care was discussed with bedside RN, patient and  during Care Team Rounds.            Jose Raul Santamaria MD   Hospitalist (Internal Medicine)  [facility]  Pager: [phone].

## 2018-02-02 ENCOUNTER — APPOINTMENT (OUTPATIENT)
Dept: PHYSICAL THERAPY | Facility: CLINIC | Age: 78
DRG: 884 | End: 2018-02-02
Payer: MEDICARE

## 2018-02-02 LAB
ANION GAP SERPL CALCULATED.3IONS-SCNC: 6 MMOL/L (ref 3–14)
BUN SERPL-MCNC: 17 MG/DL (ref 7–30)
CALCIUM SERPL-MCNC: 8.4 MG/DL (ref 8.5–10.1)
CHLORIDE SERPL-SCNC: 110 MMOL/L (ref 94–109)
CO2 SERPL-SCNC: 29 MMOL/L (ref 20–32)
CREAT SERPL-MCNC: 1.08 MG/DL (ref 0.66–1.25)
ERYTHROCYTE [DISTWIDTH] IN BLOOD BY AUTOMATED COUNT: 16.2 % (ref 10–15)
GFR SERPL CREATININE-BSD FRML MDRD: 66 ML/MIN/1.7M2
GLUCOSE SERPL-MCNC: 98 MG/DL (ref 70–99)
HCT VFR BLD AUTO: 38.4 % (ref 40–53)
HGB BLD-MCNC: 11.3 G/DL (ref 13.3–17.7)
MCH RBC QN AUTO: 25.3 PG (ref 26.5–33)
MCHC RBC AUTO-ENTMCNC: 29.4 G/DL (ref 31.5–36.5)
MCV RBC AUTO: 86 FL (ref 78–100)
PLATELET # BLD AUTO: 327 10E9/L (ref 150–450)
POTASSIUM SERPL-SCNC: 3.8 MMOL/L (ref 3.4–5.3)
RBC # BLD AUTO: 4.46 10E12/L (ref 4.4–5.9)
SODIUM SERPL-SCNC: 145 MMOL/L (ref 133–144)
WBC # BLD AUTO: 9.2 10E9/L (ref 4–11)

## 2018-02-02 PROCEDURE — 36415 COLL VENOUS BLD VENIPUNCTURE: CPT | Performed by: INTERNAL MEDICINE

## 2018-02-02 PROCEDURE — 25000132 ZZH RX MED GY IP 250 OP 250 PS 637: Mod: GY | Performed by: INTERNAL MEDICINE

## 2018-02-02 PROCEDURE — 40000193 ZZH STATISTIC PT WARD VISIT

## 2018-02-02 PROCEDURE — 85027 COMPLETE CBC AUTOMATED: CPT | Performed by: INTERNAL MEDICINE

## 2018-02-02 PROCEDURE — 99232 SBSQ HOSP IP/OBS MODERATE 35: CPT | Performed by: INTERNAL MEDICINE

## 2018-02-02 PROCEDURE — A9270 NON-COVERED ITEM OR SERVICE: HCPCS | Mod: GY | Performed by: INTERNAL MEDICINE

## 2018-02-02 PROCEDURE — 25000132 ZZH RX MED GY IP 250 OP 250 PS 637: Mod: GY | Performed by: PSYCHIATRY & NEUROLOGY

## 2018-02-02 PROCEDURE — 97116 GAIT TRAINING THERAPY: CPT | Mod: GP

## 2018-02-02 PROCEDURE — 80048 BASIC METABOLIC PNL TOTAL CA: CPT | Performed by: INTERNAL MEDICINE

## 2018-02-02 PROCEDURE — A9270 NON-COVERED ITEM OR SERVICE: HCPCS | Mod: GY | Performed by: PSYCHIATRY & NEUROLOGY

## 2018-02-02 PROCEDURE — 97530 THERAPEUTIC ACTIVITIES: CPT | Mod: GP

## 2018-02-02 PROCEDURE — 12000001 ZZH R&B MED SURG/OB UMMC

## 2018-02-02 RX ORDER — ACETAMINOPHEN 325 MG/1
650 TABLET ORAL EVERY 4 HOURS PRN
Status: DISCONTINUED | OUTPATIENT
Start: 2018-02-02 | End: 2018-02-06 | Stop reason: HOSPADM

## 2018-02-02 RX ADMIN — ACETAMINOPHEN 650 MG: 325 TABLET, FILM COATED ORAL at 23:24

## 2018-02-02 RX ADMIN — ATORVASTATIN CALCIUM 20 MG: 20 TABLET, FILM COATED ORAL at 19:56

## 2018-02-02 RX ADMIN — BUPROPION HYDROCHLORIDE 150 MG: 150 TABLET, FILM COATED, EXTENDED RELEASE ORAL at 09:45

## 2018-02-02 RX ADMIN — ASPIRIN 81 MG: 81 TABLET, COATED ORAL at 09:45

## 2018-02-02 RX ADMIN — CLINDAMYCIN HYDROCHLORIDE 300 MG: 300 CAPSULE ORAL at 09:45

## 2018-02-02 RX ADMIN — FLUTICASONE PROPIONATE 2 SPRAY: 50 SPRAY, METERED NASAL at 09:47

## 2018-02-02 RX ADMIN — TAMSULOSIN HYDROCHLORIDE 0.4 MG: 0.4 CAPSULE ORAL at 21:32

## 2018-02-02 RX ADMIN — CLINDAMYCIN HYDROCHLORIDE 300 MG: 300 CAPSULE ORAL at 19:56

## 2018-02-02 RX ADMIN — FLUTICASONE FUROATE AND VILANTEROL TRIFENATATE 1 PUFF: 200; 25 POWDER RESPIRATORY (INHALATION) at 09:46

## 2018-02-02 RX ADMIN — MULTIPLE VITAMINS W/ MINERALS TAB 1 TABLET: TAB at 09:45

## 2018-02-02 RX ADMIN — RIVASTIGMINE 1 PATCH: 4.6 PATCH, EXTENDED RELEASE TRANSDERMAL at 09:45

## 2018-02-02 RX ADMIN — ALLOPURINOL 100 MG: 100 TABLET ORAL at 09:45

## 2018-02-02 NOTE — PLAN OF CARE
Problem: Patient Care Overview  Goal: Individualization & Mutuality  2/1  15:30-23:30  VS: Stable   O2 sats 94% on room air  Lungs CTA, denies chest pain/SOB   Output: Voiding spontaneously without difficulty   Bowels/BM Previous shift reported pt had BM's on their shift    Activity Up with contact guard assist, gait belt and walker   Skin: Scabs and abrasions   Mepelix intact   Pain: Pt reporting back and neck ache.  Tylenol for pain.  Warm blanket also used for back discomfort   CMS: Numbness BLE (baseline)   Dressing: Mepelix dressings intact   Diet: Regular, tolerating well   LDA: none   Equipment: Walker, gait belt and VPM   Plan: TCU   Additional Info: Pt reporting visual hallucinations.  States (my roommate is here, even though I don't want her here.  I know she is not really here, but there is nothing I can do about it)

## 2018-02-02 NOTE — PLAN OF CARE
"Problem: Patient Care Overview  Goal: Plan of Care/Patient Progress Review  Discharge Planner PT   Patient plan for discharge: TCU  Current status: sore today in back and hip, thinks he \"did too much yesterday\".  Walking 75'x 2 with ww and rest period. Transfers with supervision.   Barriers to return to prior living situation: pt alludes to previous hallucinations but not having any presently, decr. Mental status,   Recommendations for discharge: TCU  Rationale for recommendations: for continued strengthening and balance work       Entered by: Carmina Andino 02/01/2018 6:40 PM           "

## 2018-02-02 NOTE — PLAN OF CARE
Problem: Patient Care Overview  Goal: Plan of Care/Patient Progress Review  Outcome: No Change    VS: VSS   O2: RA, pt refused CPAP, sats above 90%   Output: Voiding in the bathroom without difficulty    Last BM: 2/1/2018   Activity: Pt up to chair multiple times throughout night due to hallucinations and sleep disturbances, chair alarm and bed alarm on at all times    Skin: Scratches and abrasions on BLE, mepilexes on BLE   Pain: Denies   CMS: Numbness and tingling in all extremities    Dressing: CDI   Diet: Reg, no nausea or vomiting    LDA: None   Equipment: VPM machine, CPAP   Plan: DC to TCU or Long term care facility soon    Additional Info: Pt continuously claimed that woman was making sexual advances at him and trying to have sex with him, and stating her kids were in the room as well. Pt is aware that these are hallucinations and that is why he is here. Pt was unable to get a good nights rest tonight

## 2018-02-02 NOTE — PLAN OF CARE
"Problem: Infection, Risk/Actual (Adult)  Goal: Identify Related Risk Factors and Signs and Symptoms  Related risk factors and signs and symptoms are identified upon initiation of Human Response Clinical Practice Guideline (CPG).           VS:       Pt A/O X 3, disoriented to situation. Afebrile. VSS. Lungs- clear bilaterally with both anterior and posterior. IS encouraged. Denies nausea, shortness of breath, and chest pain.     Output:       Bowels-active in all four quadrants. BM this morning per pt report. Voids spontaneously without difficulty in the bathroom.      Activity:       Pt up in room, in hallways, and to bathroom with assist of standby and walker.     Skin:   Abrasions, scars, and scabs to extremities.     Pain:       Has pain in the back, declined any pain interventions this shift.      CMS:       CMS and Neuro's are intact. Pt has baseline numbness.      Dressing:       Dressings to bilateral legs intact. Lotion applied to bilateral lower extremities.        Diet:       Pt is on a regular diet and appetite was good this shift.       LDA:       No PIV access.      Equipment:       Bilateral heels are elevated off the bed.     Plan:       Pt is able to make needs known and the call light is within the pt's reach. Continue to monitor.       Additional Info:       VPM, bed alarm, and chair alarm on and activated. Pt continues to have active hallucinations this shift. See young woman and her children in his room and stating the women left a box of minnows in the corner. Pt also picking at his feet with grabber tool stating, \"the woman put frogs and snakes all over my room and I need to get them out\". Pt verbalizes that he understands these hallucinations are not real. Pt redirected and compression stockings removed as patient stated, \"they are full of snakes\". New socks applied to feet and pt reminded these are hallucinations and there are no snakes and no woman in room. Pt politely asked woman to leave " room and redirected when guests came to visit.

## 2018-02-02 NOTE — PROGRESS NOTES
Social Work Services Progress Note    Hospital Day: 8    [phone] [phone]    Data:  DC plan    Intervention:  Writer left voicemail message for nursing staff at [facility] to have conversation about level of care they can provide pt (pt's daughter thinks pt is close to being at [facility] for mobility).    Writer sent referral to [facility] and they will assess. Francy in admissions will forward referral information to their [facility] as pt's daughter Mackenzie would like to explore possibility for pt to move there.    Per Francy at [facility], their TCU has limited bed availability but [facility] has studio apartments that may be available.    Assessment:  pt is ready for DC once safe placement is secured    Plan:    Anticipated Disposition:  [facility]. [facility]    Barriers to d/c plan:  Bed availability, assessment, placement    Follow Up:  SUNNY cont' to follow, RNCC Mesha informed of possibility of pt returning to [facility] (maybe Monday 2/5?)

## 2018-02-02 NOTE — PROGRESS NOTES
Social Work Services Progress Note    Hospital Day: 7    Collaborated with:  Pt, daughter Mackenzie, Admissions at [facility], Dr Santamaria    Data:  DC plan    Intervention:   KHARI Dawson has not completed assessment. Writer left voicemail message for admissions at [facility] prior to 830 am this morning asking about bed availability. Mackenzie is supportive of referral to [facility] and is hopeful they will be able to accept pt.    Writer provided supportive counseling around diagnosis, resources available, and DC options. Pt and Mackenzie were receptive  Assessment:  pt endorses con't hallucinations but has not seen things that make him anxious or afriad. Mackenzie's assessment is that pt is responding positivly to medication used to treat dementia    Plan:    Anticipated Disposition:  TCU setting vs. senior living    Barriers to d/c plan:  Finding placement    Follow Up:  SW and RNCC con't to follow

## 2018-02-02 NOTE — PROGRESS NOTES
"[facility], [place]   Internal Medicine Daily Note           Interval History/Events     Overnight events reviewed  Feels better.   Hallucinations not as threatening as prior. Sleeping better  No fever, chills  No NV. remigio orals.   No LH or dizziness.   No cp or sob.  No focal s/s.        Review of Systems        4 point ROS including Respiratory, CV, GI and , other than that noted above is negative      Medications   I have reviewed current medications  in the \"current medication\" section of Epic.  Relevant changes include:     Physical Exam         Vital signs:    Blood pressure 113/51, pulse 81, temperature 96.7  F (35.9  C), temperature source Oral, resp. rate 16, weight 76.3 kg (168 lb 4.8 oz), SpO2 97 %.  Estimated body mass index is 28.01 kg/(m^2) as calculated from the following:    Height as of 5/31/17: 1.651 m (5' 5\").    Weight as of this encounter: 76.3 kg (168 lb 4.8 oz).        General: NAD  HEENT: No icterus, no pallor. At/nc  Cardiovascular: S1, S2 normal  Respiratory:  Non labored. Mild bl end exp wheeze.  GI/Abdomen: Soft, NT,ND  Neurology: Alert, awake, and oriented to time, place and person. No tremors or rigidity.   Extremities: Mild pre tibial edema.   Psychiatry: pleasant.      Laboratory and Imaging Studies     I have reviewed  laboratory and imaging studies in the Epic. Pertinent findings are as below:    BMP    Recent Labs  Lab 02/02/18 0622 02/01/18  0536 01/31/18  0516 01/30/18  0508   * 143 144 144   POTASSIUM 3.8 4.1 3.9 3.7   CHLORIDE 110* 107 110* 110*   LOU 8.4* 8.4* 8.3* 8.5   CO2 29 31 30 31   BUN 17 14 14 10   CR 1.08 1.06 1.01 0.93   GLC 98 144* 106* 89     CBC    Recent Labs  Lab 02/02/18 0622 02/01/18  0536 01/31/18  0516 01/29/18  0755   WBC 9.2 9.1 8.3 8.0   RBC 4.46 4.67 4.26* 4.36*   HGB 11.3* 11.9* 11.1* 11.3*   HCT 38.4* 39.7* 36.3* 36.9*   MCV 86 85 85 85   MCH 25.3* 25.5* 26.1* 25.9*   MCHC 29.4* 30.0* 30.6* 30.6*   RDW 16.2* 16.1* " 16.1* 16.3*    363 330 344     INRNo lab results found in last 7 days.  LFTs    Recent Labs  Lab 01/27/18  0035   ALKPHOS 171*   AST 33   ALT 27   BILITOTAL 0.4   PROTTOTAL 7.5   ALBUMIN 2.7*      PANCNo lab results found in last 7 days.    MR Brain w/o Contrast 01/29:     Impression:    1. No acute intracranial pathology.  2. Mild cerebral volume loss with mild chronic small vessel ischemic change.  3. Mineralization of the basal ganglia, within expected limits for age.    Impression/Plan      78 yo M with hx of HTN, CAD, COPD,  Gout, BPH s/P TURP, S/P THOMAS admitted for  worsening recurrent vs chronic hallucinations x ~ 1 year (since May 2017)       # Hallucinations: Unkown etiology at this time. Differential broad  Drug screen positive for Amphetamine, and Opiates. Patient and family denies any h/o amphetamine or drug use. ? False positive 2/2 bupropion.   Patient reports regular use of Alcohol. CT head on 01/11 at OSH with no acute abnormality. TSH on 01/27 within normal limits. Vitamin B12, and Folic acid normal.   TPA negative.     Psychiatry and Neurology has evaluated the patient.  OT Cognition eval 01/28: Patient MOCA scoring 26/30 (score of 26 or greater is considered normal). Had mild difficulty with drawing time on clock (visuospatial/excutive), able to recall 4/5 words, and with abstraction (stating how watch and ruler are alike).      ? lewy body dementia    - MRI Brain : as above.   - 01/30: d/w Neurology: rec: aricept 5 and fu at Neurology clinic in 1 month. Did not start as patient started on rivastigmine patch   - 01/30: Psychiatry fu consult reviewed. Noted psychiatry started patient on rivastigmine 1/30/2018.     1/31/2018: Continue rivastigmine for now. May be titrated as tolerated.   Dc Olanzepine and taper off bupropion as planned earlier,  as no evidence that it was helping the patient symptoms and possible making worse.    per psychiatry,not a candidate for [facility] psychiatry unit at  current.     - FU with Psychiatry inpatient and outpatient prn.      - Neuro: Outpatient neuropsychological testing to better assess his visual and non-visual cognitive abilities. Would suggest that he see Dr. Kimani Abbott in the [facility].   However patient daughter Ms Ortiz wants him to fu with Neurology at [facility].     - continue to monitor using VPM.  - a/w Placement. Sw working with the family.     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 Urine culture without growth, and patient had no urinary symptoms thus Ceftriaxone was discontinued.   - Continue to monitor    # BPH s/p TURP on 10/30/2017: On Tamsulosin  - Continue   - Scheduled to follow up with urology on 3/9/2018    # Hx of COPD: PTA on Advair Diskus  - Continue LABA+ICS    # Hx of CAD (s/p coronary stent placement in 2003): On Atenolol. Not on Aspirin. Not sure why.   - Continue Atenolol  - Continue Aspirin    # Hx of SANTIAGO: Continue CPAP    # Hx of Hyperlipidemia: PTA Atorvastatin  - Continue    # PTA on Furosemide: No past hx of HF noted. Patient unsure of exact reason  - Cr slightly trending up.   - 02/01: will hold for now.   - monitor.       # FEN: Regular diet  # Activity : Up with assist. PT consult  # Prophylaxis:  Ambulate with assist.   # Code status: Full     Disposition Plan   Expected discharge TCU vs LTC. A/w placement.      Entered: Jose Raul Santamaria 02/02/2018, 7:54 AM   Sw on board for placement.             Pt's care was discussed with bedside RN, patient and  during Care Team Rounds.            Jose Raul Santamaria MD   Hospitalist (Internal Medicine)  [facility]  Pager: [phone].

## 2018-02-03 ENCOUNTER — APPOINTMENT (OUTPATIENT)
Dept: PHYSICAL THERAPY | Facility: CLINIC | Age: 78
DRG: 884 | End: 2018-02-03
Payer: MEDICARE

## 2018-02-03 LAB
ANION GAP SERPL CALCULATED.3IONS-SCNC: 5 MMOL/L (ref 3–14)
BUN SERPL-MCNC: 17 MG/DL (ref 7–30)
CALCIUM SERPL-MCNC: 8.5 MG/DL (ref 8.5–10.1)
CHLORIDE SERPL-SCNC: 110 MMOL/L (ref 94–109)
CO2 SERPL-SCNC: 29 MMOL/L (ref 20–32)
CREAT SERPL-MCNC: 1.04 MG/DL (ref 0.66–1.25)
ERYTHROCYTE [DISTWIDTH] IN BLOOD BY AUTOMATED COUNT: 16.2 % (ref 10–15)
GFR SERPL CREATININE-BSD FRML MDRD: 69 ML/MIN/1.7M2
GLUCOSE SERPL-MCNC: 100 MG/DL (ref 70–99)
HCT VFR BLD AUTO: 40 % (ref 40–53)
HGB BLD-MCNC: 11.9 G/DL (ref 13.3–17.7)
MCH RBC QN AUTO: 25.6 PG (ref 26.5–33)
MCHC RBC AUTO-ENTMCNC: 29.8 G/DL (ref 31.5–36.5)
MCV RBC AUTO: 86 FL (ref 78–100)
PLATELET # BLD AUTO: 339 10E9/L (ref 150–450)
POTASSIUM SERPL-SCNC: 4.1 MMOL/L (ref 3.4–5.3)
RBC # BLD AUTO: 4.64 10E12/L (ref 4.4–5.9)
SODIUM SERPL-SCNC: 144 MMOL/L (ref 133–144)
WBC # BLD AUTO: 8.9 10E9/L (ref 4–11)

## 2018-02-03 PROCEDURE — 36415 COLL VENOUS BLD VENIPUNCTURE: CPT | Performed by: INTERNAL MEDICINE

## 2018-02-03 PROCEDURE — 80048 BASIC METABOLIC PNL TOTAL CA: CPT | Performed by: INTERNAL MEDICINE

## 2018-02-03 PROCEDURE — 40000193 ZZH STATISTIC PT WARD VISIT

## 2018-02-03 PROCEDURE — 25000132 ZZH RX MED GY IP 250 OP 250 PS 637: Mod: GY | Performed by: INTERNAL MEDICINE

## 2018-02-03 PROCEDURE — 97116 GAIT TRAINING THERAPY: CPT | Mod: GP

## 2018-02-03 PROCEDURE — A9270 NON-COVERED ITEM OR SERVICE: HCPCS | Mod: GY | Performed by: INTERNAL MEDICINE

## 2018-02-03 PROCEDURE — 99232 SBSQ HOSP IP/OBS MODERATE 35: CPT | Performed by: INTERNAL MEDICINE

## 2018-02-03 PROCEDURE — A9270 NON-COVERED ITEM OR SERVICE: HCPCS | Mod: GY | Performed by: PSYCHIATRY & NEUROLOGY

## 2018-02-03 PROCEDURE — 85027 COMPLETE CBC AUTOMATED: CPT | Performed by: INTERNAL MEDICINE

## 2018-02-03 PROCEDURE — 12000001 ZZH R&B MED SURG/OB UMMC

## 2018-02-03 PROCEDURE — 97110 THERAPEUTIC EXERCISES: CPT | Mod: GP

## 2018-02-03 PROCEDURE — 25000132 ZZH RX MED GY IP 250 OP 250 PS 637: Mod: GY | Performed by: PSYCHIATRY & NEUROLOGY

## 2018-02-03 RX ADMIN — TAMSULOSIN HYDROCHLORIDE 0.4 MG: 0.4 CAPSULE ORAL at 21:21

## 2018-02-03 RX ADMIN — ALLOPURINOL 100 MG: 100 TABLET ORAL at 08:18

## 2018-02-03 RX ADMIN — ACETAMINOPHEN 650 MG: 325 TABLET, FILM COATED ORAL at 23:43

## 2018-02-03 RX ADMIN — RIVASTIGMINE 1 PATCH: 4.6 PATCH, EXTENDED RELEASE TRANSDERMAL at 08:18

## 2018-02-03 RX ADMIN — CLINDAMYCIN HYDROCHLORIDE 300 MG: 300 CAPSULE ORAL at 08:18

## 2018-02-03 RX ADMIN — FLUTICASONE FUROATE AND VILANTEROL TRIFENATATE 1 PUFF: 200; 25 POWDER RESPIRATORY (INHALATION) at 08:18

## 2018-02-03 RX ADMIN — ASPIRIN 81 MG: 81 TABLET, COATED ORAL at 08:18

## 2018-02-03 RX ADMIN — MULTIPLE VITAMINS W/ MINERALS TAB 1 TABLET: TAB at 08:18

## 2018-02-03 RX ADMIN — ATORVASTATIN CALCIUM 20 MG: 20 TABLET, FILM COATED ORAL at 20:01

## 2018-02-03 RX ADMIN — FLUTICASONE PROPIONATE 2 SPRAY: 50 SPRAY, METERED NASAL at 08:18

## 2018-02-03 RX ADMIN — ACETAMINOPHEN 650 MG: 325 TABLET, FILM COATED ORAL at 04:16

## 2018-02-03 RX ADMIN — CLINDAMYCIN HYDROCHLORIDE 300 MG: 300 CAPSULE ORAL at 20:01

## 2018-02-03 NOTE — PROGRESS NOTES
"[facility], [place]   Internal Medicine Daily Note           Interval History/Events     Overnight events reviewed  No new concern  Hallucinations not as threatening as prior. Sleeping better  No fever, chills  No NV. remigio orals.   No LH or dizziness.   No cp or sob.  No focal s/s.        Review of Systems        4 point ROS including Respiratory, CV, GI and , other than that noted above is negative      Medications   I have reviewed current medications  in the \"current medication\" section of Epic.  Relevant changes include:     Physical Exam         Vital signs:    Blood pressure 129/61, pulse 81, temperature 97  F (36.1  C), temperature source Oral, resp. rate 16, weight 76.3 kg (168 lb 4.8 oz), SpO2 96 %.  Estimated body mass index is 28.01 kg/(m^2) as calculated from the following:    Height as of 5/31/17: 1.651 m (5' 5\").    Weight as of this encounter: 76.3 kg (168 lb 4.8 oz).        General: NAD  HEENT: No icterus, no pallor. At/nc  Cardiovascular: S1, S2 normal  Respiratory:  Non labored. Mild bl end exp wheeze.  GI/Abdomen: Soft, NT,ND  Neurology: Alert, awake, and oriented to time, place and person.   No tremors or rigidity.   Extremities: Mild pre tibial edema. Skin: multiple skin abrasions, open areas LE  Psychiatry: pleasant.      Laboratory and Imaging Studies     I have reviewed  laboratory and imaging studies in the Epic. Pertinent findings are as below:    BMP    Recent Labs  Lab 02/03/18 0542 02/02/18 0622 02/01/18 0536 01/31/18  0516    145* 143 144   POTASSIUM 4.1 3.8 4.1 3.9   CHLORIDE 110* 110* 107 110*   LOU 8.5 8.4* 8.4* 8.3*   CO2 29 29 31 30   BUN 17 17 14 14   CR 1.04 1.08 1.06 1.01   * 98 144* 106*     CBC    Recent Labs  Lab 02/03/18 0542 02/02/18 0622 02/01/18  0536 01/31/18  0516   WBC 8.9 9.2 9.1 8.3   RBC 4.64 4.46 4.67 4.26*   HGB 11.9* 11.3* 11.9* 11.1*   HCT 40.0 38.4* 39.7* 36.3*   MCV 86 86 85 85   MCH 25.6* 25.3* 25.5* 26.1* "   [facility] HealthC 29.8* 29.4* 30.0* 30.6*   RDW 16.2* 16.2* 16.1* 16.1*    327 363 330       MR Brain w/o Contrast 01/29:     Impression:    1. No acute intracranial pathology.  2. Mild cerebral volume loss with mild chronic small vessel ischemic change.  3. Mineralization of the basal ganglia, within expected limits for age.    Impression/Plan      78 yo M with hx of HTN, CAD, COPD,  Gout, BPH s/P TURP, S/P THOMAS admitted for  worsening recurrent vs chronic hallucinations x ~ 1 year (since May 2017)       # Hallucinations: Unkown etiology . ? lewy body dementia  Drug screen positive for Amphetamine, and Opiates. Patient and family denies any h/o amphetamine or drug use. ? False positive 2/2 bupropion.   Patient reports regular use of Alcohol. CT head on 01/11 at OSH with no acute abnormality. TSH on 01/27 within normal limits. Vitamin B12, and Folic acid normal.   TPA negative.     Psychiatry and Neurology has evaluated the patient.  OT Cognition eval 01/28: Patient MOCA scoring 26/30 (score of 26 or greater is considered normal). Had mild difficulty with drawing time on clock (visuospatial/excutive), able to recall 4/5 words, and with abstraction (stating how watch and ruler are alike).      - MRI Brain : as above.   - 01/30: d/w Neurology: rec: aricept 5 and fu at Neurology clinic in 1 month. Did not start as patient started on rivastigmine patch   - 01/30: Psychiatry fu consult reviewed. Noted psychiatry started patient on rivastigmine 1/30/2018.     1/31/2018: Continue rivastigmine for now. May be titrated as tolerated.   Dc Olanzepine and taper off bupropion as planned earlier,  as no evidence that it was helping the patient symptoms and possible making worse.    per psychiatry,not a candidate for [facility] psychiatry unit at current.     - FU with Psychiatry inpatient and outpatient prn.      - Neuro: Outpatient neuropsychological testing to better assess his visual and non-visual cognitive abilities. Would suggest that  he see Dr. Kimani Abbott in the [facility].   However patient daughter Ms Ortiz wants him to fu with Neurology at [facility].     - Continue to monitor using VPM.  - a/w Placement. Sw working with the family.     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 Urine culture without growth, and patient had no urinary symptoms thus Ceftriaxone was discontinued.   - Continue to monitor.    # BPH s/p TURP on 10/30/2017: On Tamsulosin  - Continue   - Scheduled to follow up with urology on 3/9/2018    # Hx of COPD: PTA on Advair Diskus  - Continue LABA+ICS    # Hx of CAD (s/p coronary stent placement in 2003): On Atenolol. Not on Aspirin. Not sure why.   - Continue Atenolol  - Continue Aspirin    # Hx of SANTIAGO: Continue CPAP    # Hx of Hyperlipidemia: PTA Atorvastatin  - Continue    # PTA on Furosemide: No past hx of HF noted. Patient unsure of exact reason  - Cr slightly trending up.   - 02/01: will hold for now.   - monitor.       # FEN: Regular diet  # Activity : Up with assist. PT consult  # Prophylaxis:  Ambulate with assist.   # Code status: Full     Disposition Plan   Expected discharge TCU vs LTC. A/w placement.      Entered: Jose Raul Santamaria 02/03/2018, 10:19 AM   Sw on board for placement.             Pt's care was discussed with bedside RN, patient and  during Care Team Rounds.            Jose Raul Santamaria MD   Hospitalist (Internal Medicine)  [facility]  Pager: [phone].

## 2018-02-03 NOTE — PLAN OF CARE
Problem: Infection, Risk/Actual (Adult)  Goal: Identify Related Risk Factors and Signs and Symptoms  Related risk factors and signs and symptoms are identified upon initiation of Human Response Clinical Practice Guideline (CPG).           VS:       Pt A/O X 3, disoriented with active visual hallucinations. Afebrile. VSS. Lungs- clear bilaterally with both anterior and posterior. IS encouraged. Denies nausea, shortness of breath, and chest pain.     Output:       Bowels- active in all four quadrants. Pt had BM this morning per pt report. Voids spontaneously without difficulty in the bathroom.      Activity:       Pt up in room, to bathroom, and in hallways with assist of standby and walker.     Skin:   Abrasions, scabs and scars to extremities.     Pain:       Has pain in the back and declined pain medications this shift.      CMS:       CMS and Neuro's are intact. Pt has baseline numbness and tingling in all extremities per pt report.       Dressing:       Mepilex to bilateral lower extremities are intact.      Diet:       Pt is on a regular diet and appetite was good this shift.       LDA:       Pt has no PIV access.      Equipment:       Bilateral heels are elevated off the bed.     Plan:       Pt is able to make needs known and the call light is within the pt's reach. Continue to monitor.       Additional Info:       Plan is for patient to discharge to TCU or Long-Term Care when there is a bed available. VPM, bed alarm, and chair alarm active.

## 2018-02-03 NOTE — PLAN OF CARE
Problem: PT General Care Plan  Goal: PT target date for goal attainment  PT: patient displayed increased fatigue and SOB today.     Discharge Planner PT   Patient plan for discharge: TCU  Current status: patient ambulated 200 feet using rolling walker with CGA and CGA with transfers from chair. SpO2 after activity 95% on room air. Patient participated with standing exercises with increased SOB and fatigue.   Barriers to return to prior living situation: decreased activity tolerance, decreased endurance and requires assist.   Recommendations for discharge: TCU  Rationale for recommendations: improve strength and endurance to progress toward independence.        Entered by: Jia Mustafa 02/03/2018 4:41 PM

## 2018-02-03 NOTE — PLAN OF CARE
Problem: Infection, Risk/Actual (Adult)  Goal: Identify Related Risk Factors and Signs and Symptoms  Related risk factors and signs and symptoms are identified upon initiation of Human Response Clinical Practice Guideline (CPG).   Outcome: No Change    VS: Trending stable   O2: Stable in mid to upper 90's on room air.   Output: Voids adequate amounts in bathroom/urinal without issue   Last BM: 2.2.18 per pt report   Activity: Up with standby assist. Ambulated down [place] x2 this shift.   Skin: Intact ex incisions, scabs, scabs and abrasions to extremeties   Pain: Denied pain this shift.   CMS: Base line numbness in lower extremity. CMS and Neur intact   Dressing: Dressings on bilateral extremities are CDI   Diet: Regular, appetite is good tolerates oral intake   LDA: No peripheral IV    Equipment: Walker, Chair and Bed alarm. VPM   Plan: Continue with plan of care   Additional Info: Pt continues to actively hallucinate. He has been largely redirectable and accepting of distractions this shift.

## 2018-02-03 NOTE — PLAN OF CARE
Problem: Patient Care Overview  Goal: Plan of Care/Patient Progress Review  VS: stable   O2: 100% on RA.   Output: Voids adequate amounts in bathroom/urinal without issue   Last BM: 2/2/18    Activity: Up with standby assist. Ambulated down [place] x2 this shift.   Skin: Skin is intact. Incisions CDI. scabs and abrasions on bilateral extremeties   Pain: Denies   CMS: Baseline numbness in lower extremity. CMS and Neuro intact   Dressing: Dressings on bilateral extremities are CDI   Diet: Regular,    LDA: None   Equipment: Walker, Chair and Bed alarm. VPM   Plan: Continue with plan of care   Additional Info: Pt continues to actively hallucinate. Patient is easily redirectable. Attempted to self transfer multiple times.

## 2018-02-04 ENCOUNTER — APPOINTMENT (OUTPATIENT)
Dept: PHYSICAL THERAPY | Facility: CLINIC | Age: 78
DRG: 884 | End: 2018-02-04
Payer: MEDICARE

## 2018-02-04 LAB
ANION GAP SERPL CALCULATED.3IONS-SCNC: 6 MMOL/L (ref 3–14)
BUN SERPL-MCNC: 16 MG/DL (ref 7–30)
CALCIUM SERPL-MCNC: 8.1 MG/DL (ref 8.5–10.1)
CHLORIDE SERPL-SCNC: 114 MMOL/L (ref 94–109)
CO2 SERPL-SCNC: 26 MMOL/L (ref 20–32)
CREAT SERPL-MCNC: 0.96 MG/DL (ref 0.66–1.25)
ERYTHROCYTE [DISTWIDTH] IN BLOOD BY AUTOMATED COUNT: 16.4 % (ref 10–15)
GFR SERPL CREATININE-BSD FRML MDRD: 76 ML/MIN/1.7M2
GLUCOSE SERPL-MCNC: 96 MG/DL (ref 70–99)
HCT VFR BLD AUTO: 35.8 % (ref 40–53)
HGB BLD-MCNC: 10.9 G/DL (ref 13.3–17.7)
MCH RBC QN AUTO: 26.2 PG (ref 26.5–33)
MCHC RBC AUTO-ENTMCNC: 30.4 G/DL (ref 31.5–36.5)
MCV RBC AUTO: 86 FL (ref 78–100)
PLATELET # BLD AUTO: 282 10E9/L (ref 150–450)
POTASSIUM SERPL-SCNC: 3.9 MMOL/L (ref 3.4–5.3)
RBC # BLD AUTO: 4.16 10E12/L (ref 4.4–5.9)
SODIUM SERPL-SCNC: 146 MMOL/L (ref 133–144)
WBC # BLD AUTO: 9 10E9/L (ref 4–11)

## 2018-02-04 PROCEDURE — 40000193 ZZH STATISTIC PT WARD VISIT

## 2018-02-04 PROCEDURE — A9270 NON-COVERED ITEM OR SERVICE: HCPCS | Mod: GY | Performed by: INTERNAL MEDICINE

## 2018-02-04 PROCEDURE — 25000132 ZZH RX MED GY IP 250 OP 250 PS 637: Mod: GY | Performed by: INTERNAL MEDICINE

## 2018-02-04 PROCEDURE — 97116 GAIT TRAINING THERAPY: CPT | Mod: GP

## 2018-02-04 PROCEDURE — 25000125 ZZHC RX 250: Performed by: INTERNAL MEDICINE

## 2018-02-04 PROCEDURE — 97110 THERAPEUTIC EXERCISES: CPT | Mod: GP

## 2018-02-04 PROCEDURE — 99232 SBSQ HOSP IP/OBS MODERATE 35: CPT | Performed by: INTERNAL MEDICINE

## 2018-02-04 PROCEDURE — 25000132 ZZH RX MED GY IP 250 OP 250 PS 637: Mod: GY | Performed by: PSYCHIATRY & NEUROLOGY

## 2018-02-04 PROCEDURE — 40000275 ZZH STATISTIC RCP TIME EA 10 MIN

## 2018-02-04 PROCEDURE — 94640 AIRWAY INHALATION TREATMENT: CPT | Mod: 76

## 2018-02-04 PROCEDURE — A9270 NON-COVERED ITEM OR SERVICE: HCPCS | Mod: GY | Performed by: PSYCHIATRY & NEUROLOGY

## 2018-02-04 PROCEDURE — 85027 COMPLETE CBC AUTOMATED: CPT | Performed by: INTERNAL MEDICINE

## 2018-02-04 PROCEDURE — 36415 COLL VENOUS BLD VENIPUNCTURE: CPT | Performed by: INTERNAL MEDICINE

## 2018-02-04 PROCEDURE — 94640 AIRWAY INHALATION TREATMENT: CPT

## 2018-02-04 PROCEDURE — 80048 BASIC METABOLIC PNL TOTAL CA: CPT | Performed by: INTERNAL MEDICINE

## 2018-02-04 PROCEDURE — 12000001 ZZH R&B MED SURG/OB UMMC

## 2018-02-04 RX ORDER — PROCHLORPERAZINE MALEATE 5 MG
5 TABLET ORAL EVERY 6 HOURS PRN
Status: DISCONTINUED | OUTPATIENT
Start: 2018-02-04 | End: 2018-02-06 | Stop reason: HOSPADM

## 2018-02-04 RX ORDER — METOCLOPRAMIDE 5 MG/1
5 TABLET ORAL EVERY 6 HOURS PRN
Status: DISCONTINUED | OUTPATIENT
Start: 2018-02-04 | End: 2018-02-06 | Stop reason: HOSPADM

## 2018-02-04 RX ORDER — ONDANSETRON 2 MG/ML
4 INJECTION INTRAMUSCULAR; INTRAVENOUS EVERY 6 HOURS PRN
Status: DISCONTINUED | OUTPATIENT
Start: 2018-02-04 | End: 2018-02-06 | Stop reason: HOSPADM

## 2018-02-04 RX ORDER — ONDANSETRON 4 MG/1
4 TABLET, ORALLY DISINTEGRATING ORAL EVERY 6 HOURS PRN
Status: DISCONTINUED | OUTPATIENT
Start: 2018-02-04 | End: 2018-02-06 | Stop reason: HOSPADM

## 2018-02-04 RX ORDER — PROCHLORPERAZINE 25 MG
12.5 SUPPOSITORY, RECTAL RECTAL EVERY 12 HOURS PRN
Status: DISCONTINUED | OUTPATIENT
Start: 2018-02-04 | End: 2018-02-06 | Stop reason: HOSPADM

## 2018-02-04 RX ORDER — METOCLOPRAMIDE HYDROCHLORIDE 5 MG/ML
5 INJECTION INTRAMUSCULAR; INTRAVENOUS EVERY 6 HOURS PRN
Status: DISCONTINUED | OUTPATIENT
Start: 2018-02-04 | End: 2018-02-06 | Stop reason: HOSPADM

## 2018-02-04 RX ADMIN — RIVASTIGMINE 1 PATCH: 4.6 PATCH, EXTENDED RELEASE TRANSDERMAL at 08:50

## 2018-02-04 RX ADMIN — CLINDAMYCIN HYDROCHLORIDE 300 MG: 300 CAPSULE ORAL at 19:37

## 2018-02-04 RX ADMIN — ONDANSETRON 4 MG: 4 TABLET, ORALLY DISINTEGRATING ORAL at 20:35

## 2018-02-04 RX ADMIN — TAMSULOSIN HYDROCHLORIDE 0.4 MG: 0.4 CAPSULE ORAL at 21:54

## 2018-02-04 RX ADMIN — ASPIRIN 81 MG: 81 TABLET, COATED ORAL at 08:48

## 2018-02-04 RX ADMIN — FLUTICASONE PROPIONATE 2 SPRAY: 50 SPRAY, METERED NASAL at 08:50

## 2018-02-04 RX ADMIN — IPRATROPIUM BROMIDE AND ALBUTEROL SULFATE 3 ML: .5; 3 SOLUTION RESPIRATORY (INHALATION) at 11:08

## 2018-02-04 RX ADMIN — FLUTICASONE FUROATE AND VILANTEROL TRIFENATATE 1 PUFF: 200; 25 POWDER RESPIRATORY (INHALATION) at 08:49

## 2018-02-04 RX ADMIN — ALLOPURINOL 100 MG: 100 TABLET ORAL at 08:48

## 2018-02-04 RX ADMIN — MULTIPLE VITAMINS W/ MINERALS TAB 1 TABLET: TAB at 08:47

## 2018-02-04 RX ADMIN — ACETAMINOPHEN 650 MG: 325 TABLET, FILM COATED ORAL at 21:54

## 2018-02-04 RX ADMIN — CLINDAMYCIN HYDROCHLORIDE 300 MG: 300 CAPSULE ORAL at 08:47

## 2018-02-04 RX ADMIN — ATORVASTATIN CALCIUM 20 MG: 20 TABLET, FILM COATED ORAL at 19:37

## 2018-02-04 NOTE — PLAN OF CARE
Problem: PT General Care Plan  Goal: PT target date for goal attainment    Discharge Planner PT   Patient plan for discharge: TCU  Current status: patient ambulated 150 feet with SBA and increased fatigue and SOB. Patient CGA with transfers and standing exercises: SpO2 96% on room air  Barriers to return to prior living situation: safety awareness, decreased strength and endurance  Recommendations for discharge: TCU  Rationale for recommendations: improve endurance, strength for increasing independence with activity        Entered by: Jia Mustafa 02/04/2018 5:35 PM

## 2018-02-04 NOTE — PROGRESS NOTES
"[facility], [place]   Internal Medicine Daily Note           Interval History/Events     Overnight events reviewed  No new concern  Hallucinations not as threatening as prior. Sleeping better  No fever, chills  No NV. remigio orals.   No LH or dizziness.   No cp or sob.  No focal s/s.        Review of Systems        4 point ROS including Respiratory, CV, GI and , other than that noted above is negative      Medications   I have reviewed current medications  in the \"current medication\" section of Epic.  Relevant changes include:     Physical Exam         Vital signs:    Blood pressure 107/69, pulse 100, temperature 96.4  F (35.8  C), temperature source Oral, resp. rate 16, weight 76.3 kg (168 lb 4.8 oz), SpO2 100 %.  Estimated body mass index is 28.01 kg/(m^2) as calculated from the following:    Height as of 5/31/17: 1.651 m (5' 5\").    Weight as of this encounter: 76.3 kg (168 lb 4.8 oz).        General: NAD  HEENT: No icterus, no pallor. At/nc  Cardiovascular: S1, S2 normal  Respiratory:  Non labored. Mild bl end exp wheeze.  GI/Abdomen: Soft, NT,ND  Neurology: Alert, awake, and oriented to time, place and person.   No tremors or rigidity.   Extremities: Mild pre tibial edema. Skin: multiple skin abrasions, mepilex over open areas LE   Psychiatry: pleasant.      Laboratory and Imaging Studies     I have reviewed  laboratory and imaging studies in the Epic. Pertinent findings are as below:    BMP    Recent Labs  Lab 02/04/18 0626 02/03/18 0542 02/02/18 0622 02/01/18  0536   * 144 145* 143   POTASSIUM 3.9 4.1 3.8 4.1   CHLORIDE 114* 110* 110* 107   LOU 8.1* 8.5 8.4* 8.4*   CO2 26 29 29 31   BUN 16 17 17 14   CR 0.96 1.04 1.08 1.06   GLC 96 100* 98 144*     CBC    Recent Labs  Lab 02/04/18 0626 02/03/18 0542 02/02/18 0622 02/01/18  0536   WBC 9.0 8.9 9.2 9.1   RBC 4.16* 4.64 4.46 4.67   HGB 10.9* 11.9* 11.3* 11.9*   HCT 35.8* 40.0 38.4* 39.7*   MCV 86 86 86 85   MCH 26.2* 25.6* " 25.3* 25.5*   MCHC 30.4* 29.8* 29.4* 30.0*   RDW 16.4* 16.2* 16.2* 16.1*    339 327 363       MR Brain w/o Contrast 01/29:     Impression:    1. No acute intracranial pathology.  2. Mild cerebral volume loss with mild chronic small vessel ischemic change.  3. Mineralization of the basal ganglia, within expected limits for age.    Impression/Plan      76 yo M with hx of HTN, CAD, COPD,  Gout, BPH s/P TURP, S/P THOMAS admitted for  worsening recurrent vs chronic hallucinations x ~ 1 year (since May 2017)       # Hallucinations: Unkown etiology . ? lewy body dementia  Drug screen positive for Amphetamine, and Opiates. Patient and family denies any h/o amphetamine or drug use. ? False positive 2/2 bupropion.   Patient reports regular use of Alcohol. CT head on 01/11 at OSH with no acute abnormality. TSH on 01/27 within normal limits. Vitamin B12, and Folic acid normal.   TPA negative.     Psychiatry and Neurology has evaluated the patient.  OT Cognition eval 01/28: Patient MOCA scoring 26/30 (score of 26 or greater is considered normal). Had mild difficulty with drawing time on clock (visuospatial/excutive), able to recall 4/5 words, and with abstraction (stating how watch and ruler are alike).      - MRI Brain : as above.   - 01/30: d/w Neurology: rec: aricept 5 and fu at Neurology clinic in 1 month. Did not start as patient started on rivastigmine patch   - 01/30: Psychiatry fu consult reviewed. Noted psychiatry started patient on rivastigmine 1/30/2018.     1/31/2018: Continue rivastigmine for now. May be titrated as tolerated.   Dc Olanzepine and taper off bupropion as planned earlier,  as no evidence that it was helping the patient symptoms and possible making worse.    per psychiatry,not a candidate for [facility] psychiatry unit at current.     - FU with Psychiatry inpatient and outpatient prn.      - Neuro: Outpatient neuropsychological testing to better assess his visual and non-visual cognitive abilities. Would  suggest that he see Dr. Kimani Abbott in the [facility].   However patient daughter Ms Ortiz wants him to fu with Neurology at [facility].     - Continue to monitor using VPM.  - a/w Placement. Sw working with the family.     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 Urine culture without growth, and patient had no urinary symptoms thus Ceftriaxone was discontinued.   - Continue to monitor.    # BPH s/p TURP on 10/30/2017: On Tamsulosin  - Continue   - Scheduled to follow up with urology on 3/9/2018    # Hx of COPD: PTA on Advair Diskus  - Continue LABA+ICS    # Hx of CAD (s/p coronary stent placement in 2003): On Atenolol. Not on Aspirin. Not sure why.   - Continue Atenolol  - Continue Aspirin    # Hx of SANTIAGO: Continue CPAP    # Hx of Hyperlipidemia: PTA Atorvastatin  - Continue    # PTA on Furosemide: No past hx of HF noted. Patient unsure of exact reason  - Cr slightly trending up.   - 02/01: will hold for now.   - encourage oral fluids.   - fu BMP  - I/o as able.       # FEN: Regular diet  # Activity : Up with assist. PT consult  # Prophylaxis:  Ambulate with assist.   # Code status: Full     Disposition Plan   Expected discharge TCU vs Assisted living-- A/w placement.      Entered: Jose Raul Santamaria 02/04/2018, 11:42 AM   Sw on board for placement.             Pt's care was discussed with bedside RN, patient and  during Care Team Rounds.            Jose Raul Santamaria MD   Hospitalist (Internal Medicine)  [facility]  Pager: [phone].

## 2018-02-04 NOTE — PLAN OF CARE
"Problem: Sensory/Perceptual Alteration (Adult)  Goal: Identify Related Risk Factors and Signs and Symptoms  Related risk factors and signs and symptoms are identified upon initiation of Human Response Clinical Practice Guideline (CPG).   Outcome: No Change  Patient has been awake most of the night. Alert, and able to answer all orientation questions correctly. Patient continues to have hallucinations and I able to verbalize that he knows that the writer cannot see them. Patient states he sees people smoking in his room and a women in his bed. Patient requesting to sleep in the chair because of this. Upon hourly rounding, patient can be heard speaking to someone. VP is on for safety. Bed alarm and chair alarm in place. Patient call appropriately and does not get up without calling. Patient reported lower back pain and requested tylenol with effective results. VSS. Denies any chest pain, numbness or tingling. Patient reports SOB \"due to the people smoking in his room.\" Patient does not appear SOB at this time. Patient reports no difficulty voiding. No BM this shift. Patient was able to use call light to make needs known and frequent rounding complete. Continue POC.      "

## 2018-02-04 NOTE — PLAN OF CARE
Problem: Patient Care Overview  Goal: Plan of Care/Patient Progress Review  Outcome: Improving  Focus: Patient Care.  D: Patient alert but hallucinating and verbalized seeing animals and people in his room; patient verbalized people are smoking in his room and insisted that we get the people out of his room; disoriented to place and situations at times; VS with elevated HR; LS clear and BS+; c/o soreness to buttocks at start of shift.  I: Denies need for pain meds but repositioned for comfort; bed and chair alarm maintained; PO antibiotic given; PO fluids encouraged; up with SBA and walker.  A: Good appetite; sat up in chair most of shift but repositioned often for comfort; ambulated to BR and voiding good amounts;  [place] patch intact and in place on left upper arm; redness, scars, scabs and wounds to upper and lower extremities; dressings CD&I.  P: Continue with patient care.

## 2018-02-04 NOTE — PROGRESS NOTES
Focus: patients status  DB: patient sitting up in chair with bed alarm and video patient monitoring on continuously. Patient extremely delightful in conversation!.   I: talked with patient about use of call system and plan of cares.   E: Patient utilized the call system well throughout the shift. Patient has family members currently visiting at bedside. Status of patient will continue to be monitored.

## 2018-02-05 ENCOUNTER — APPOINTMENT (OUTPATIENT)
Dept: PHYSICAL THERAPY | Facility: CLINIC | Age: 78
DRG: 884 | End: 2018-02-05
Payer: MEDICARE

## 2018-02-05 LAB — SODIUM SERPL-SCNC: 146 MMOL/L (ref 133–144)

## 2018-02-05 PROCEDURE — A9270 NON-COVERED ITEM OR SERVICE: HCPCS | Mod: GY | Performed by: INTERNAL MEDICINE

## 2018-02-05 PROCEDURE — A9270 NON-COVERED ITEM OR SERVICE: HCPCS | Mod: GY | Performed by: PSYCHIATRY & NEUROLOGY

## 2018-02-05 PROCEDURE — 25000132 ZZH RX MED GY IP 250 OP 250 PS 637: Mod: GY | Performed by: INTERNAL MEDICINE

## 2018-02-05 PROCEDURE — 36415 COLL VENOUS BLD VENIPUNCTURE: CPT | Performed by: INTERNAL MEDICINE

## 2018-02-05 PROCEDURE — 99232 SBSQ HOSP IP/OBS MODERATE 35: CPT | Performed by: INTERNAL MEDICINE

## 2018-02-05 PROCEDURE — 25000132 ZZH RX MED GY IP 250 OP 250 PS 637: Mod: GY | Performed by: PSYCHIATRY & NEUROLOGY

## 2018-02-05 PROCEDURE — 84295 ASSAY OF SERUM SODIUM: CPT | Performed by: INTERNAL MEDICINE

## 2018-02-05 PROCEDURE — 40000193 ZZH STATISTIC PT WARD VISIT

## 2018-02-05 PROCEDURE — 97116 GAIT TRAINING THERAPY: CPT | Mod: GP

## 2018-02-05 PROCEDURE — 12000001 ZZH R&B MED SURG/OB UMMC

## 2018-02-05 PROCEDURE — 97750 PHYSICAL PERFORMANCE TEST: CPT | Mod: GP

## 2018-02-05 RX ADMIN — ATORVASTATIN CALCIUM 20 MG: 20 TABLET, FILM COATED ORAL at 20:16

## 2018-02-05 RX ADMIN — MULTIPLE VITAMINS W/ MINERALS TAB 1 TABLET: TAB at 08:47

## 2018-02-05 RX ADMIN — ACETAMINOPHEN 650 MG: 325 TABLET, FILM COATED ORAL at 15:23

## 2018-02-05 RX ADMIN — TAMSULOSIN HYDROCHLORIDE 0.4 MG: 0.4 CAPSULE ORAL at 21:37

## 2018-02-05 RX ADMIN — FLUTICASONE PROPIONATE 2 SPRAY: 50 SPRAY, METERED NASAL at 08:47

## 2018-02-05 RX ADMIN — CLINDAMYCIN HYDROCHLORIDE 300 MG: 300 CAPSULE ORAL at 08:48

## 2018-02-05 RX ADMIN — CLINDAMYCIN HYDROCHLORIDE 300 MG: 300 CAPSULE ORAL at 20:16

## 2018-02-05 RX ADMIN — ACETAMINOPHEN 650 MG: 325 TABLET, FILM COATED ORAL at 06:44

## 2018-02-05 RX ADMIN — RIVASTIGMINE 1 PATCH: 4.6 PATCH, EXTENDED RELEASE TRANSDERMAL at 08:44

## 2018-02-05 RX ADMIN — ATENOLOL 50 MG: 50 TABLET ORAL at 08:48

## 2018-02-05 RX ADMIN — ALLOPURINOL 100 MG: 100 TABLET ORAL at 08:48

## 2018-02-05 RX ADMIN — ASPIRIN 81 MG: 81 TABLET, COATED ORAL at 08:48

## 2018-02-05 RX ADMIN — FLUTICASONE FUROATE AND VILANTEROL TRIFENATATE 1 PUFF: 200; 25 POWDER RESPIRATORY (INHALATION) at 08:43

## 2018-02-05 NOTE — PLAN OF CARE
Problem: Sensory/Perceptual Alteration (Adult)  Goal: Functional Ability/Safety  Patient will demonstrate the desired outcomes by discharge/transition of care.   Outcome: No Change  Patient slept throughout the night. AxO. Patient denies any hallucinations at this time. VSS. Denies any chest pain, SOB, new numbness to tingling. Voiding without difficulty. No BM this shift. Mepilex to the BLE are CDI. VPM in use for patient safety, and bed/chair alarms on. VPM went off one time this AM, patient just shifting in bed. Tylenol given this AM for back pain. Patient called appropriately this shift. Frequent rounding complete and patient appears to be resting comfortably. Continue POC.

## 2018-02-05 NOTE — PLAN OF CARE
Problem: Patient Care Overview  Goal: Plan of Care/Patient Progress Review  Discharge Planner PT   Patient plan for discharge: LTC   Current status: Patient ambulates safely with  feet with modified independence. Completes balance assessments including TUG scoring 21.25 seconds indicating high risk for falls; 30 second sit to stand scoring 0 due to inability to complete without use of arms, indicating risk for falls.   Barriers to return to prior living situation: Safety awareness, fall risk, deconditioning  Recommendations for discharge: TCU vs LTC  Rationale for recommendations: Patient would benefit from continued PT for aerobic exercise, strength training, and balance to increase indep and safety with mobility        Entered by: Sima Zuniga 02/05/2018 11:19 AM

## 2018-02-05 NOTE — PLAN OF CARE
Problem: Patient Care Overview  Goal: Plan of Care/Patient Progress Review    VS: VSS.    O2: Maintaining O2 SATS in upper 90s on room air. Hx of COPD; expiratory and inspiratory wheezes, frequent,productive cough; baseline for patient per report. Declined BIPAP at bedtime this evening.    Output: Voiding independently in the bathroom.    Last BM: 2/3/2018    Activity: Up with SBA x1 and walker; utilizes call light appropriately before getting up.    Skin: Mepilex dressings covering reddened areas on bilateral shins; baseline for patient. Multiple scabs/ scratches on bilateral legs from previous injuries per patient report.    Pain: Pt reports baseline back pain; PRN Tylenol given.    CMS: Baseline tingling in bilateral legs.    Dressing: Mepilex dressings on shins CDI    Diet: Regular; episode of nausea/ vomiting after dinner tonight. Moonlighter paged, new order for Zofran. Pt reported decrease in nausea after administration.    LDA: No PIV    Equipment: VPM Machine; chair and bed alarms, declined BIPAP, declined PCDS,    Plan: Continue with VPM monitoring.    Additional Info: Pleasant affect. Son visited [facility] to watch Super Bowl. Pt actively hallucinating, stating there was a lady in the bathroom, in his bed, and there were two gentleman smoking in the room.

## 2018-02-05 NOTE — PROGRESS NOTES
30-Second Sit to Stand Test:  The test is conducted with a straight back chair, without armrests, with a 17-inch seat height.    Patient Score (reps): 0     The 30 Second Sit to Stand Test is considered a test of fall risk.  Data from KHARI MCNEAL, cosponsored by [place] Dept of Health:  8 or less times = High Fall Risk   9 to 12 times = Moderate Risk  13 or more times = Low Risk    The 30 Second Sit to Stand Test is also considered a test of leg strength and endurance.   Normative Data from Alli et al,. 2001  Reps: Men       Reps: Women         Age  60-64                14-19                       12-17                               65-69                12-18                       11-16                    70-74                12-17                       10-15              75-79                11-17                       10-15                    80-84                10-15                         9-14  85-89                 8-14                          8-13  90-94                 7-12                          4-11    Assessment (rationale for performing, application to patient s function & care plan): Completed 30 second sit to stand to assess pt balance, pt scoring 0 due to inability to complete with use of B UE    Timed Up and Go (TUG): TUG is a test of basic mobility skills. It is used to screen individuals prone to falls.  Gait assistive device used: FWW     Patient score 21.25 seconds  ?13.5 seconds indicate at risk for falls in older adults according to Kathrin-Srinivasa et al 2000.  ?30 seconds - indicates dependency in most ADL and mobility skills according to Postiffanieo & Sage 1991    Minimal Detectable Change for patients with Alzheimer s = 4.09 sec   Minimal Detectable Change for patients with Parkinson s Disease = 3.5 sec   according to Kanu & Jeff Alves 2011    Assessment (rationale for performing, application to patient s function & care plan): Completed TUG to assess patient static and dynamic  balance and fall risk; pt at high risk for falls scoring 21.25 seconds with use of FWW   Minutes billed as physical performance test: 15

## 2018-02-05 NOTE — PROGRESS NOTES
Writer was asked to talk to patient about finding housing. Pt stated that he was wondering about getting back to his [facility] since they are still charging him money. The patient also stated that he did not want to go to a TCU. Writer stated that she would follow up and either let him know the outcome or the unit  will follow up with him tomorrow. Pt also asked that the SW call his DTR's to touch base. SW will continue to follow and assist as needed.    Writer called and left a voicemail for [facility], and asked for a call back when possible to discuss the pt. SW will continue to follow and assist as needed.    Addendum 2/5: Writer received a call back from Yuliet quiñones nurse at [facility], she stated that they were handling the pt's hallucinations while he was with them, but the problem being he was starting to scare the other residents and they had to hide sharp objects from him.Writer will update the unit  on this and have her follow up tomorrow. SW will continue to follow and assist as needed.        TEQUILA Choudhury  Covering 8 [address] A (Sasha Gentile)  [phone]

## 2018-02-05 NOTE — PROGRESS NOTES
"[facility], [place]   Internal Medicine Daily Note           Interval History/Events     Overnight events reviewed  No new concern  Hallucinations not as threatening as prior. Sleeping better  No fever, chills  No NV. remigio orals.   No LH or dizziness.   No cp or sob.  No focal s/s.        Review of Systems        4 point ROS including Respiratory, CV, GI and , other than that noted above is negative      Medications   I have reviewed current medications  in the \"current medication\" section of Epic.  Relevant changes include:     Physical Exam         Vital signs:    Blood pressure 121/59, pulse 94, temperature 96.3  F (35.7  C), resp. rate 16, weight 77.3 kg (170 lb 6.4 oz), SpO2 95 %.  Estimated body mass index is 28.36 kg/(m^2) as calculated from the following:    Height as of 5/31/17: 1.651 m (5' 5\").    Weight as of this encounter: 77.3 kg (170 lb 6.4 oz).        General: NAD  HEENT: No icterus, no pallor. At/nc  Cardiovascular: S1, S2 normal  Respiratory:  Non labored. Mild bl end exp wheeze.  GI/Abdomen: Soft, NT,ND  Neurology: Alert, awake, and oriented to time, place and person.   No tremors or rigidity.   Extremities: Mild pre tibial edema. Skin: multiple skin abrasions, mepilex over open areas LE   Psychiatry: pleasant.      Laboratory and Imaging Studies     I have reviewed  laboratory and imaging studies in the Epic. Pertinent findings are as below:    BMP    Recent Labs  Lab 02/05/18 0527 02/04/18 0626 02/03/18 0542 02/02/18 0622 02/01/18  0536   * 146* 144 145* 143   POTASSIUM  --  3.9 4.1 3.8 4.1   CHLORIDE  --  114* 110* 110* 107   LOU  --  8.1* 8.5 8.4* 8.4*   CO2  --  26 29 29 31   BUN  --  16 17 17 14   CR  --  0.96 1.04 1.08 1.06   GLC  --  96 100* 98 144*     CBC    Recent Labs  Lab 02/04/18 0626 02/03/18 0542 02/02/18 0622 02/01/18  0536   WBC 9.0 8.9 9.2 9.1   RBC 4.16* 4.64 4.46 4.67   HGB 10.9* 11.9* 11.3* 11.9*   HCT 35.8* 40.0 38.4* 39.7*   MCV 86 " 86 86 85   MCH 26.2* 25.6* 25.3* 25.5*   MCHC 30.4* 29.8* 29.4* 30.0*   RDW 16.4* 16.2* 16.2* 16.1*    339 327 363       MR Brain w/o Contrast 01/29:     Impression:    1. No acute intracranial pathology.  2. Mild cerebral volume loss with mild chronic small vessel ischemic change.  3. Mineralization of the basal ganglia, within expected limits for age.    Impression/Plan      76 yo M with hx of HTN, CAD, COPD,  Gout, BPH s/P TURP, S/P THOMAS admitted for  worsening recurrent vs chronic hallucinations x ~ 1 year (since May 2017)       # Hallucinations: Unkown etiology . ? lewy body dementia  Drug screen positive for Amphetamine, and Opiates. Patient and family denies any h/o amphetamine or drug use. ? False positive 2/2 bupropion.   Patient reports regular use of Alcohol. CT head on 01/11 at OSH with no acute abnormality. TSH on 01/27 within normal limits. Vitamin B12, and Folic acid normal.   TPA negative.     Psychiatry and Neurology has evaluated the patient.  OT Cognition eval 01/28: Patient MOCA scoring 26/30 (score of 26 or greater is considered normal). Had mild difficulty with drawing time on clock (visuospatial/excutive), able to recall 4/5 words, and with abstraction (stating how watch and ruler are alike).      - MRI Brain : as above.   - 01/30: d/w Neurology: rec: aricept 5 and fu at Neurology clinic in 1 month. Did not start as patient started on rivastigmine patch   - 01/30: Psychiatry fu consult reviewed. Noted psychiatry started patient on rivastigmine 1/30/2018.     1/31/2018: Continue rivastigmine for now. May be titrated as tolerated.   Dc Olanzepine and taper off bupropion as planned earlier,  as no evidence that it was helping the patient symptoms and possible making worse.    per psychiatry,not a candidate for [facility] psychiatry unit at current.     - FU with Psychiatry inpatient and outpatient prn.      - Neuro: Outpatient neuropsychological testing to better assess his visual and non-visual  cognitive abilities. Would suggest that he see Dr. Kimani Abbott in the [facility].   However patient daughter Ms Ortiz wants him to fu with Neurology at [facility].     - Continue to monitor using VPM.  - a/w Placement. Sw working with the family.     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 Urine culture without growth, and patient had no urinary symptoms thus Ceftriaxone was discontinued.   - Continue to monitor.    # BPH s/p TURP on 10/30/2017: On Tamsulosin  - Continue   - Scheduled to follow up with urology on 3/9/2018    # Hx of COPD: PTA on Advair Diskus  - Continue LABA+ICS    # Hx of CAD (s/p coronary stent placement in 2003): On Atenolol. Not on Aspirin. Not sure why.   - Continue Atenolol  - Continue Aspirin    # Hx of SANTIAGO: Continue CPAP    # Hx of Hyperlipidemia: PTA Atorvastatin  - Continue    # PTA on Furosemide: No past hx of HF noted. Patient unsure of exact reason  - Cr slightly trending up.   - 02/01: will hold for now.   - encourage oral fluids.   - fu BMP  - I/o as able.       # FEN: Regular diet  # Activity : Up with assist. PT consult  # Prophylaxis:  Ambulate with assist.   # Code status: Full     Disposition Plan   Expected discharge TCU vs Assisted living-- A/w placement.      Entered: Jose Raul Santamaria 02/05/2018, 11:34 AM   Sw on board for placement.             Pt's care was discussed with bedside RN, patient and  during Care Team Rounds.            Jose Raul Santamaria MD   Hospitalist (Internal Medicine)  [facility]  Pager: [phone].

## 2018-02-05 NOTE — PROGRESS NOTES
"CLINICAL NUTRITION SERVICES - ASSESSMENT NOTE     Nutrition Prescription    RECOMMENDATIONS FOR MDs/PROVIDERS TO ORDER:  None    Malnutrition Status:    Patient does not meet two of the above criteria necessary for diagnosing malnutrition    Recommendations already ordered by Registered Dietitian (RD):  None    Future/Additional Recommendations:  None     REASON FOR ASSESSMENT  Deion Diaz is a 77 year old male assessed by the dietitian for LOS    NUTRITION HISTORY  Patient reports he usually eats a good breakfast, skips lunch, and eats dinner. Dinner is different every night depending on what the  prepares in the kitchen at the assisted living facility he stays at. He has been trying not to snack as much an is eating yogurt everyday with something on it (fruit, cereal).     CURRENT NUTRITION ORDERS  Diet: Regular  Intake/Tolerance: Patient states his appetite is the same here as at home. He really enjoys the food. Last night after eating pizza he began vomiting, unsure  Per flowsheets, patient eating % of meals.     LABS  Labs reviewed  Na: 146 (H)    MEDICATIONS  Medications reviewed  Multivitamin with minerals    ANTHROPOMETRICS  Height: 165.1 cm (5' 5\")  Most Recent Weight: 77.3 kg (170 lb 6.4 oz)    IBW: 62 kg  BMI: Overweight BMI 25-29.9  Weight History: Patient states he usually weighs around 200 lb but has lost about 30-40 lb over the past year due to making healthier diet choices. Pt's weight has been stable between 76.3-77.3 kg during admission (x9 days).  Wt Readings from Last 10 Encounters:   02/05/18 77.3 kg (170 lb 6.4 oz)   05/30/17 90.7 kg (200 lb)   09/17/16 92.1 kg (203 lb)   08/24/16 90.7 kg (200 lb)     Dosing Weight: 66 kg (adjusted weight)    ASSESSED NUTRITION NEEDS  Estimated Energy Needs: 1300-3270 kcals/day (25 - 30 kcals/kg)  Justification: Maintenance  Estimated Protein Needs: 53-66 grams protein/day (0.8 - 1 grams of pro/kg)  Justification: Maintenance  Estimated Fluid " Needs:  (1 mL/kcal)   Justification: Maintenance or Per provider pending fluid status    PHYSICAL FINDINGS  See malnutrition section below.    MALNUTRITION  % Intake: No decreased intake noted  % Weight Loss: None noted - intentional weight loss for health  Subcutaneous Fat Loss: None observed  Muscle Loss: None observed  Fluid Accumulation/Edema: None noted  Malnutrition Diagnosis: Patient does not meet two of the above criteria necessary for diagnosing malnutrition    NUTRITION DIAGNOSIS  No nutrition diagnosis at this time       INTERVENTIONS  Implementation  Nutrition Education: Discussed home nutrition and current appetite/intake. No further nutrition needs at this time.        Monitoring/Evaluation  Progress toward goals will be monitored and evaluated per protocol.    Ashley Ace RD, LD  Unit Pager: [phone]

## 2018-02-05 NOTE — PLAN OF CARE
Problem: Sensory/Perceptual Alteration (Adult)  Goal: Identify Related Risk Factors and Signs and Symptoms  Related risk factors and signs and symptoms are identified upon initiation of Human Response Clinical Practice Guideline (CPG).   Outcome: Improving    VS:     VSS on RA   Output:     Voids using bathroom. Bm today 2/5 and passing gas   Activity:     Up in chair most of shift.  Ambulated in room and to bathroom multiple times this shift   Skin: Excessive number of scabs   Pain:     Pain in back but did not want meds for it.       CMS:     Numbness in both lower extremities baseline.   Dressing:     Mepliex dressings applied to bilateral shins; CDI; change tomorrow 2/6   Diet:     Regular diet, tolerated well, no NV   LDA:     No IV, no drains   Equipment:     Walker, VPM, Chair and bed alarms   Plan:     Continue plan of care and monitor hallucinations, continue VPM   Additional Info:     Pt called appropriately this shift. Pt started to see people in room around 1300.  First boy under bed (3-4 years old).  Spent the afternoon trying to entertain him.  His lady friend around 1400 released a bunch of ants.  Also, he did see an orchestra playing outside his window which he found odd because we are on the 8th floor.  Alert and oriented x4

## 2018-02-06 ENCOUNTER — APPOINTMENT (OUTPATIENT)
Dept: PHYSICAL THERAPY | Facility: CLINIC | Age: 78
DRG: 884 | End: 2018-02-06
Payer: MEDICARE

## 2018-02-06 VITALS
HEART RATE: 84 BPM | RESPIRATION RATE: 16 BRPM | WEIGHT: 170.4 LBS | SYSTOLIC BLOOD PRESSURE: 120 MMHG | BODY MASS INDEX: 28.36 KG/M2 | OXYGEN SATURATION: 95 % | TEMPERATURE: 96.2 F | DIASTOLIC BLOOD PRESSURE: 56 MMHG

## 2018-02-06 LAB
ANION GAP SERPL CALCULATED.3IONS-SCNC: 7 MMOL/L (ref 3–14)
BUN SERPL-MCNC: 13 MG/DL (ref 7–30)
CALCIUM SERPL-MCNC: 8 MG/DL (ref 8.5–10.1)
CHLORIDE SERPL-SCNC: 110 MMOL/L (ref 94–109)
CO2 SERPL-SCNC: 28 MMOL/L (ref 20–32)
CREAT SERPL-MCNC: 1.03 MG/DL (ref 0.66–1.25)
GFR SERPL CREATININE-BSD FRML MDRD: 70 ML/MIN/1.7M2
GLUCOSE SERPL-MCNC: 91 MG/DL (ref 70–99)
POTASSIUM SERPL-SCNC: 4.3 MMOL/L (ref 3.4–5.3)
SODIUM SERPL-SCNC: 145 MMOL/L (ref 133–144)

## 2018-02-06 PROCEDURE — A9270 NON-COVERED ITEM OR SERVICE: HCPCS | Mod: GY | Performed by: PSYCHIATRY & NEUROLOGY

## 2018-02-06 PROCEDURE — A9270 NON-COVERED ITEM OR SERVICE: HCPCS | Mod: GY | Performed by: INTERNAL MEDICINE

## 2018-02-06 PROCEDURE — 25000132 ZZH RX MED GY IP 250 OP 250 PS 637: Mod: GY | Performed by: PSYCHIATRY & NEUROLOGY

## 2018-02-06 PROCEDURE — 80048 BASIC METABOLIC PNL TOTAL CA: CPT | Performed by: INTERNAL MEDICINE

## 2018-02-06 PROCEDURE — 97110 THERAPEUTIC EXERCISES: CPT | Mod: GP

## 2018-02-06 PROCEDURE — 25000132 ZZH RX MED GY IP 250 OP 250 PS 637: Mod: GY | Performed by: INTERNAL MEDICINE

## 2018-02-06 PROCEDURE — 40000193 ZZH STATISTIC PT WARD VISIT

## 2018-02-06 PROCEDURE — 99239 HOSP IP/OBS DSCHRG MGMT >30: CPT | Performed by: INTERNAL MEDICINE

## 2018-02-06 PROCEDURE — 36415 COLL VENOUS BLD VENIPUNCTURE: CPT | Performed by: INTERNAL MEDICINE

## 2018-02-06 RX ORDER — RIVASTIGMINE 4.6 MG/24H
1 PATCH, EXTENDED RELEASE TRANSDERMAL DAILY
Qty: 30 PATCH | Refills: 1 | Status: SHIPPED | OUTPATIENT
Start: 2018-02-07

## 2018-02-06 RX ORDER — CLINDAMYCIN HCL 300 MG
300 CAPSULE ORAL 2 TIMES DAILY
Qty: 60 CAPSULE | Refills: 1 | Status: SHIPPED | OUTPATIENT
Start: 2018-02-06

## 2018-02-06 RX ORDER — RIVASTIGMINE 4.6 MG/24H
1 PATCH, EXTENDED RELEASE TRANSDERMAL DAILY
Qty: 30 PATCH | Refills: 0 | Status: SHIPPED | OUTPATIENT
Start: 2018-02-07 | End: 2018-02-06

## 2018-02-06 RX ADMIN — ATENOLOL 50 MG: 50 TABLET ORAL at 09:07

## 2018-02-06 RX ADMIN — FLUTICASONE FUROATE AND VILANTEROL TRIFENATATE 1 PUFF: 200; 25 POWDER RESPIRATORY (INHALATION) at 09:11

## 2018-02-06 RX ADMIN — MULTIPLE VITAMINS W/ MINERALS TAB 1 TABLET: TAB at 09:07

## 2018-02-06 RX ADMIN — RIVASTIGMINE 1 PATCH: 4.6 PATCH, EXTENDED RELEASE TRANSDERMAL at 09:11

## 2018-02-06 RX ADMIN — CLINDAMYCIN HYDROCHLORIDE 300 MG: 300 CAPSULE ORAL at 09:07

## 2018-02-06 RX ADMIN — ASPIRIN 81 MG: 81 TABLET, COATED ORAL at 09:06

## 2018-02-06 RX ADMIN — FLUTICASONE PROPIONATE 2 SPRAY: 50 SPRAY, METERED NASAL at 09:11

## 2018-02-06 RX ADMIN — ALLOPURINOL 100 MG: 100 TABLET ORAL at 09:07

## 2018-02-06 RX ADMIN — ACETAMINOPHEN 650 MG: 325 TABLET, FILM COATED ORAL at 00:15

## 2018-02-06 NOTE — PLAN OF CARE
Problem: Patient Care Overview  Goal: Plan of Care/Patient Progress Review  Outcome: Adequate for Discharge Date Met: 02/06/18  Pt discharge to shelter via WC accompanied with family, discharge medication, and discharge instruction, given all belongings sent home with pt and pt understand discharge plan.

## 2018-02-06 NOTE — PLAN OF CARE
Problem: PT General Care Plan  Goal: PT target date for goal attainment  PT: patient on track to d/c to home patient met 3 out of 4 goals with 1 out of 4 goals not met.     Discharge Planner PT   Patient plan for discharge: home  Current status: patient did not walk today but previously ambulated 200 feet with supervision and modified independent with bed mobility and transfers.   Barriers to return to prior living situation: none anticipated  Recommendations for discharge: home with home PT  Rationale for recommendations: improve endurance and balance for safe return to community.        Entered by: Jia Mustafa 02/06/2018 4:20 PM

## 2018-02-06 NOTE — PLAN OF CARE
Problem: Patient Care Overview  Goal: Plan of Care/Patient Progress Review  Outcome: Improving    VS: VSS, A&O X 4.   O2: Room air sat upper 90's   Output: Adequate voiding in bathroom.   Last BM: 02/06/18   Activity: Up with walker and SBA.   Skin: Intact except some redness on lower extremity.   Pain: Denies pain or discomfort.   CMS: CMS and neuro intact.   Dressing: Mepilex on RLE CDI   Diet: Regular tolerating with out N/V   LDA: None.   Equipment: Walker, VPM, chair alarm.   Plan: Discharge this afternoon to skilled nursing and family is transporting.   Additional Info:

## 2018-02-06 NOTE — PROGRESS NOTES
Care Coordinator- Discharge Planning     Admission Date/Time:  1/26/2018  Attending MD:  Grady Mccall MD     Data  Date of initial CC assessment:  2/6/2018  Chart reviewed, discussed with interdisciplinary team.   Patient was admitted for:   1. Hallucinations of tactile sensation    2. Visual hallucination         Assessment  Concerns with insurance coverage for discharge needs: None.  Current Living Situation: Patient lives in an assisted living facility.  Support System: Involved  Services Involved: Assisted Living  Transportation: Family or Friend will provide  Barriers to Discharge: None      Coordination of Care and Referrals: This writer spoke with patient's family, Nuha and Mackenzie, they are comfortable with patient going back to his [facility]. Spoke with Yluiet at the [facility] they are able to take the patient back this afternoon. Patient's brother will transport patient at 3pm. Neurology notes and results to be printed off and transport with patient. No further discharge concerns at this time. Patient will have neurologist appointment on 2/7, this appointment has been scheduled for several months. CC to follow as needed.    Yuliet [facility], phone [phone], fax [phone]  Mackenzie, daughter, phone [phone]  Nuha, daughter, phone [phone]    Plan  Anticipated Discharge Date:  2/6/2018  Anticipated Discharge Plan:  Home to the [facility]    CTS Handoff completed:  YES    Mesha Diane RN, BSN  Care Coordinator, 8A  Phone [phone]  Pager [phone]

## 2018-02-06 NOTE — DISCHARGE SUMMARY
Discharge Summary    Deion Diaz MRN# [id number]   YOB: 1940 Age: 77 year old     Date of Admission:  1/26/2018  Date of Discharge:  2/6/2018  Admitting Physician:  Denilson Edmondson MD  Discharge Physician:  Grady Mccall MD (Contact: [phone])  Discharging Service:  Internal Medicine     Primary Provider: Jerrod Graves          Discharge Diagnosis:     Hallucinations  Left hip prosthetic infection  BPH  COPD  History of coronary artery disease  Dyslipidemia  Obstructive sleep apnea             Discharge Disposition:   Discharged to assisted living           Condition on Discharge:   Discharge condition: Stable   Code status on discharge: Full Code           Procedures:   No procedures performed during this admission          Discharge Medications:     Current Discharge Medication List      START taking these medications    Details   aspirin EC 81 MG EC tablet Take 1 tablet (81 mg) by mouth daily  Qty: 30 tablet, Refills: 0    Associated Diagnoses: PVD (peripheral vascular disease) (H)      melatonin 1 MG TABS tablet Take 1 tablet (1 mg) by mouth nightly as needed for sleep  Qty: 30 tablet, Refills: 1    Associated Diagnoses: Insomnia, unspecified type      clindamycin (CLEOCIN) 300 MG capsule Take 1 capsule (300 mg) by mouth 2 times daily  Qty: 60 capsule, Refills: 1    Associated Diagnoses: Infection of prosthetic hip joint, subsequent encounter      rivastigmine (EXELON) 4.6 MG/24HR 24 hr patch Place 1 patch onto the skin daily  Qty: 30 patch, Refills: 1    Associated Diagnoses: Hallucinations of tactile sensation         CONTINUE these medications which have NOT CHANGED    Details   acetaminophen (TYLENOL) 325 MG tablet Take 2 tablets (650 mg) by mouth 3 times daily  Qty: 100 tablet    Associated Diagnoses: Other chronic pain      TAMSULOSIN HCL PO Take 0.4 mg by mouth At Bedtime      fluticasone-salmeterol (ADVAIR) 500-50 MCG/DOSE diskus inhaler Inhale 1 puff into the lungs every 12 hours       albuterol (PROAIR HFA, PROVENTIL HFA, VENTOLIN HFA) 108 (90 BASE) MCG/ACT inhaler Inhale 2 puffs into the lungs every 6 hours as needed       ALLOPURINOL PO Take 100 mg by mouth daily       ATENOLOL PO Take 50 mg by mouth daily       ATORVASTATIN CALCIUM PO Take 20 mg by mouth daily       multivitamin, therapeutic with minerals (MULTI-VITAMIN) TABS Take 1 tablet by mouth daily      NITROGLYCERIN SL Place 0.4 mg under the tongue         STOP taking these medications       furosemide (LASIX) 40 MG tablet Comments:   Reason for Stopping:         gabapentin (NEURONTIN) 300 MG capsule Comments:   Reason for Stopping:         buPROPion (WELLBUTRIN XL) 150 MG 24 hr tablet Comments:   Reason for Stopping:         HYDROcodone-acetaminophen (NORCO) 5-325 MG per tablet Comments:   Reason for Stopping:         NONFORMULARY Comments:   Reason for Stopping:         Ca Carbonate-Mag Hydroxide (ROLAIDS PO) Comments:   Reason for Stopping:                     Consultations:   Consultation during this admission received from Neurology, psychiatry ,Pt,OT                Brief History of Illness:    Deion Diaz is a 77 year old male with a history of CAD s/p stent 2003, HTN, CKD, COPD, SANTIAGO, who presents with persistent (versus recurrent) and worsening auditory and tactile hallucinations over the past year.  Patient admitted to [facility] recently for similar symptoms. Please see the HPI for further details.          Hospital Course:      76 yo M with hx of HTN, CAD, COPD,  Gout, BPH s/P TURP, S/P THOMAS admitted for  worsening recurrent vs chronic hallucinations x ~ 1 year (since May 2017)         # Hallucinations: Unkown etiology . ? lewy body dementia  Drug screen positive for Amphetamine, and Opiates. Patient and family denies any h/o amphetamine or drug use. ? False positive 2/2 bupropion.   Patient reports regular use of Alcohol. CT head on 01/11 at OSH with no acute abnormality. TSH on 01/27 within normal limits. Vitamin B12, and  Folic acid normal.   TPA negative.   Psychiatry and Neurology has evaluated the patient.  OT Cognition eval 01/28: Patient MOCA scoring 26/30 (score of 26 or greater is considered normal). Had mild difficulty with drawing time on clock (visuospatial/excutive), able to recall 4/5 words, and with abstraction (stating how watch and ruler are alike).       - MRI Brain on 1/29:Impression:    1. No acute intracranial pathology.  2. Mild cerebral volume loss with mild chronic small vessel ischemic change.  3. Mineralization of the basal ganglia, within expected limits for age.  - 01/30: d/w Neurology: rec: aricept 5 and fu at Neurology clinic in 1 month. Did not start as patient started on rivastigmine patch   - 01/30: Psychiatry fu consult reviewed. Noted psychiatry started patient on rivastigmine 1/30/2018.    1/31/2018: Continue rivastigmine for now. May be titrated as tolerated as out patient.   Dc Olanzepine and tapered off bupropion as per recommendations.   - FU with Psychiatry as outpatient prn.       - Neuro: Outpatient neuropsychological testing to better assess his visual and non-visual cognitive abilities. Would suggest that he see Dr. Kimani Abbott in the [facility].   However patient daughter Ms Ortiz wants him to fu with Neurology at [facility].      At the time of discharge patient still has hallucinations  but aware that they are not real and less bothersome.   discussed with his assisted-living facility who felt confident in taking care of the patient, so he was discharged back to his facility.    He need to follow with the psychiatrist and a neurologist for further titration of his medications.        # S/p left hip replacement: Complicated by  prosthesis infection. On Clindamycin suppression . Continued Clindamycin 300 mg BID   - Follow up with Orthopedics for continued management      # Pyuria: Initially started on Ceftriaxone for possible UTI. Urine culture without growth, and  patient had no urinary symptoms thus Ceftriaxone was discontinued.   He remained asymptomatic      # BPH s/p TURP on 10/30/2017: On Tamsulosin which will be continued now.  - Scheduled to follow up with urology on 3/9/2018     # Hx of COPD: PTA on Advair Diskus  - Continue LABA+ICS     # Hx of CAD (s/p coronary stent placement in 2003): On Atenolol. Not on Aspirin. Not sure why. He was started on aspirin 81 mg which will be continued at the time of discharge.Continue Atenolol     # Hx of SANTIAGO: Continue CPAP     # Hx of Hyperlipidemia: PTA Atorvastatin  - Continue the same at discharge      # PTA on Furosemide: No past hx of HF noted. Patient unsure of exact reason  Discontinued lasix                  Final Day of Progress before Discharge:       Physical Exam:  Blood pressure 120/56, pulse 84, temperature 96.2  F (35.7  C), temperature source Oral, resp. rate 16, weight 77.3 kg (170 lb 6.4 oz), SpO2 95 %.    General: NAD  HEENT: No icterus, no pallor. At/nc  Cardiovascular: S1, S2 normal  Respiratory:  Non labored. Mild bl end exp wheeze.  GI/Abdomen: Soft, NT,ND  Neurology: Alert, awake, and oriented to time, place and person.   No tremors or rigidity.   Extremities: Mild pre tibial edema. Skin: multiple skin abrasions, mepilex over open areas LE   Psychiatry: pleasant         Data:  All laboratory data reviewed             Significant Results:   No results found for this or any previous visit (from the past 48 hour(s)).             Pending Results:   Unresulted Labs Ordered in the Past 30 Days of this Admission     No orders found from 11/27/2017 to 1/27/2018.                  Discharge Instructions and Follow-Up:     Discharge Procedure Orders  Reason for your hospital stay   Order Comments: Hallucinations     Adult [facility] Follow-up and recommended labs and tests   Order Comments: Follow up with primary care provider, Jerrod Graves, within 7 days for hospital follow- up.  No follow up labs or test are  needed.  Follow up with psychiatry in   2 weeks  Follow up with neuropsychiatry in 2-4 weeks   Follow up with Dr.James Abbott at [facility] clinic     Appointments on [place] and/or [facility] (with [facility] or [facility] provider or service). Call [phone] if you haven't heard regarding these appointments within 7 days of discharge.     Activity   Order Comments: Your activity upon discharge: activity as tolerated   Order Specific Question Answer Comments   Is discharge order? Yes      Full Code     Diet   Order Comments: Follow this diet upon discharge: 2g salt   Order Specific Question Answer Comments   Is discharge order? Yes             Attestation:  Grady Mccall.    Time spent on patient: 40 minutes total including face to face and coordinating care time reviewing current illness, any medication changes, and the care plan for today.

## 2018-02-06 NOTE — PLAN OF CARE
Problem: Sensory/Perceptual Alteration (Adult)  Goal: Identify Related Risk Factors and Signs and Symptoms  Related risk factors and signs and symptoms are identified upon initiation of Human Response Clinical Practice Guideline (CPG).   Outcome: Improving    VS: Trending stable    O2: Stable in mid to upper 90's on room air   Output: Voids adequate amounts in bathroom without issue.    Last BM: 2.5.18 per pt report   Activity: Up with stand by assist. Ambulated in room and cuba this evening.    Skin: Incisions, scabs, abrasions BLLE, covered with mepilex    Pain: Reported headache early in shift - given tylenol with relief   CMS: Baseline numbness in BLLE   Dressing: Mepliex are CDI   Diet: Regular, appetite is good - tolerates oral intake well    LDA: No PIV   Equipment: VPM, chair and bed alarms   Plan: Continue with plan of care.   Additional Info: Pt continues to endores visual hallucinations. They appeared less intrusive this shift, he required less redirection/reassurance. Pt confirmed this to be true.

## 2018-02-06 NOTE — PROGRESS NOTES
Social Work Services Progress Note    Hospital Day: 12  Collaborated with:  Admissions at [facility], RN[facility]    Data:  DC plan    Intervention:  Writer reviewed pt's chart, discussed care needs for DC plan and explore possibility of pt returning home (pt now stating he does not want TCU).    Per [facility], they do not anticipate having an opening until Thurs/Fri. [facility] will assess pt for going home. He does have Neurology Appointment on 2/7/18 at 9:30 am. Continued hospital stay jeopardizes pt's ability to keep this needed appointment for further work up and follow up.    Assessment:  per [facility], pt, his family and [facility] are agreeable to have pt return to his group home    Plan:    Anticipated Disposition:  [facility], ODILON Zimmer following for DC plan    Barriers to d/c plan:  None noted-pt to be picked up at 3pm    Follow Up:  SW to remain available per request/referral, Proceed to DC

## 2018-02-06 NOTE — PLAN OF CARE
Problem: Sensory/Perceptual Alteration (Adult)  Goal: Identify Related Risk Factors and Signs and Symptoms  Related risk factors and signs and symptoms are identified upon initiation of Human Response Clinical Practice Guideline (CPG).     VS: VS stable, denies chest pain   O2: > 95% on RA, denies SOB   Output: Voiding adequate amounts in bathroom   Last BM: 2/5/2018   Activity: Ambulated to bathroom Ax1    Skin: Abrasions, bruises, scabs, scars on bilateral UE/LE    Pain: Reported achy L hip; given 650 mg Tylenol with relief   CMS: Baseline numbness in BLE    Dressing: Mepilex dressing on bilateral shins CDI    Diet: Regular    LDA: None    Equipment: Walker, VPM    Plan: Continue to monitor    Additional Info: Bed alarm activated. No hallucinations this shift, patient cooperative

## 2018-02-07 NOTE — PLAN OF CARE
Problem: Patient Care Overview  Goal: Plan of Care/Patient Progress Review  Physical Therapy Discharge Summary    Reason for therapy discharge:    Discharged to home with home therapy.    Progress towards therapy goal(s). See goals on Care Plan in [facility] electronic health record for goal details.  Goals partially met.  Barriers to achieving goals:   discharge from facility.    Therapy recommendation(s):    Continued therapy is recommended.  Rationale/Recommendations:  Pt would benefit from further skilled PT for LE strengthening, balance, and home safety evaluation. .

## 2018-02-23 ENCOUNTER — HOSPITAL ENCOUNTER (OUTPATIENT)
Dept: NUCLEAR MEDICINE | Facility: CLINIC | Age: 78
Setting detail: NUCLEAR MEDICINE
End: 2018-02-23
Payer: MEDICARE

## 2018-02-23 ENCOUNTER — HOSPITAL ENCOUNTER (OUTPATIENT)
Dept: NUCLEAR MEDICINE | Facility: CLINIC | Age: 78
Setting detail: NUCLEAR MEDICINE
Discharge: HOME | End: 2018-02-23
Admitting: STUDENT IN AN ORGANIZED HEALTH CARE EDUCATION/TRAINING PROGRAM
Payer: MEDICARE

## 2018-02-23 DIAGNOSIS — G31.83 LEWY BODY DEMENTIA WITHOUT BEHAVIORAL DISTURBANCE (H): ICD-10-CM

## 2018-02-23 DIAGNOSIS — R44.1 VISUAL HALLUCINATIONS: ICD-10-CM

## 2018-02-23 DIAGNOSIS — F02.80 LEWY BODY DEMENTIA WITHOUT BEHAVIORAL DISTURBANCE (H): ICD-10-CM

## 2018-02-23 DIAGNOSIS — G20.C PARKINSONISM, UNSPECIFIED PARKINSONISM TYPE (H): ICD-10-CM

## 2018-02-23 PROCEDURE — 78607 NM BRAIN IMAGING TOMOGRAPHIC (SPECT) DATSCAN: CPT

## 2018-02-23 PROCEDURE — 34300033 ZZH RX 343

## 2018-02-23 PROCEDURE — A9584 IODINE I-123 IOFLUPANE: HCPCS

## 2018-02-23 PROCEDURE — 25000125 ZZHC RX 250

## 2018-02-23 PROCEDURE — A9270 NON-COVERED ITEM OR SERVICE: HCPCS

## 2018-02-23 RX ADMIN — POTASSIUM IODIDE 130 MG: 1 SOLUTION ORAL at 10:05

## 2018-02-23 RX ADMIN — IOFLUPANE I-123 5.16 MCI.: 2 INJECTION, SOLUTION INTRAVENOUS at 11:26

## 2019-10-02 ENCOUNTER — HEALTH MAINTENANCE LETTER (OUTPATIENT)
Age: 79
End: 2019-10-02

## 2019-12-16 ENCOUNTER — HEALTH MAINTENANCE LETTER (OUTPATIENT)
Age: 79
End: 2019-12-16

## 2021-01-15 ENCOUNTER — HEALTH MAINTENANCE LETTER (OUTPATIENT)
Age: 81
End: 2021-01-15

## 2021-09-05 ENCOUNTER — HEALTH MAINTENANCE LETTER (OUTPATIENT)
Age: 81
End: 2021-09-05

## 2022-02-19 ENCOUNTER — HEALTH MAINTENANCE LETTER (OUTPATIENT)
Age: 82
End: 2022-02-19

## 2022-10-22 ENCOUNTER — HEALTH MAINTENANCE LETTER (OUTPATIENT)
Age: 82
End: 2022-10-22

## 2023-01-01 ENCOUNTER — HEALTH MAINTENANCE LETTER (OUTPATIENT)
Age: 83
End: 2023-01-01